# Patient Record
Sex: MALE | Race: WHITE | NOT HISPANIC OR LATINO | Employment: PART TIME | URBAN - METROPOLITAN AREA
[De-identification: names, ages, dates, MRNs, and addresses within clinical notes are randomized per-mention and may not be internally consistent; named-entity substitution may affect disease eponyms.]

---

## 2017-06-05 ENCOUNTER — HOSPITAL ENCOUNTER (EMERGENCY)
Facility: CLINIC | Age: 57
Discharge: HOME | End: 2017-06-06
Attending: PHYSICIAN ASSISTANT | Admitting: PHYSICIAN ASSISTANT
Payer: COMMERCIAL

## 2017-06-05 ENCOUNTER — APPOINTMENT (OUTPATIENT)
Dept: GENERAL RADIOLOGY | Facility: CLINIC | Age: 57
End: 2017-06-05
Attending: PHYSICIAN ASSISTANT
Payer: COMMERCIAL

## 2017-06-05 DIAGNOSIS — J44.1 COPD EXACERBATION (H): ICD-10-CM

## 2017-06-05 DIAGNOSIS — F17.200 TOBACCO DEPENDENCE: ICD-10-CM

## 2017-06-05 DIAGNOSIS — J18.9 PNEUMONIA OF LEFT LOWER LOBE DUE TO INFECTIOUS ORGANISM: ICD-10-CM

## 2017-06-05 LAB
ANION GAP SERPL CALCULATED.3IONS-SCNC: 8 MMOL/L (ref 3–14)
BASE EXCESS BLDV CALC-SCNC: 1.5 MMOL/L
BASOPHILS # BLD AUTO: 0 10E9/L (ref 0–0.2)
BASOPHILS NFR BLD AUTO: 0.3 %
BUN SERPL-MCNC: 11 MG/DL (ref 7–30)
CALCIUM SERPL-MCNC: 8.8 MG/DL (ref 8.5–10.1)
CHLORIDE SERPL-SCNC: 108 MMOL/L (ref 94–109)
CO2 SERPL-SCNC: 26 MMOL/L (ref 20–32)
CREAT SERPL-MCNC: 1.07 MG/DL (ref 0.66–1.25)
CRP SERPL-MCNC: 3.9 MG/L (ref 0–8)
D DIMER PPP FEU-MCNC: 0.5 UG/ML FEU (ref 0–0.5)
DIFFERENTIAL METHOD BLD: ABNORMAL
EOSINOPHIL # BLD AUTO: 0.5 10E9/L (ref 0–0.7)
EOSINOPHIL NFR BLD AUTO: 3.5 %
ERYTHROCYTE [DISTWIDTH] IN BLOOD BY AUTOMATED COUNT: 13.7 % (ref 10–15)
GFR SERPL CREATININE-BSD FRML MDRD: 72 ML/MIN/1.7M2
GLUCOSE SERPL-MCNC: 106 MG/DL (ref 70–99)
HCO3 BLDV-SCNC: 26 MMOL/L (ref 21–28)
HCT VFR BLD AUTO: 43.3 % (ref 40–53)
HGB BLD-MCNC: 14.4 G/DL (ref 13.3–17.7)
IMM GRANULOCYTES # BLD: 0 10E9/L (ref 0–0.4)
IMM GRANULOCYTES NFR BLD: 0.2 %
LACTATE BLD-SCNC: 1.4 MMOL/L (ref 0.7–2.1)
LYMPHOCYTES # BLD AUTO: 2.5 10E9/L (ref 0.8–5.3)
LYMPHOCYTES NFR BLD AUTO: 19.1 %
MCH RBC QN AUTO: 31.5 PG (ref 26.5–33)
MCHC RBC AUTO-ENTMCNC: 33.3 G/DL (ref 31.5–36.5)
MCV RBC AUTO: 95 FL (ref 78–100)
MONOCYTES # BLD AUTO: 1.2 10E9/L (ref 0–1.3)
MONOCYTES NFR BLD AUTO: 9 %
NEUTROPHILS # BLD AUTO: 8.9 10E9/L (ref 1.6–8.3)
NEUTROPHILS NFR BLD AUTO: 67.9 %
NT-PROBNP SERPL-MCNC: 196 PG/ML (ref 0–900)
PCO2 BLDV: 37 MM HG (ref 40–50)
PH BLDV: 7.45 PH (ref 7.32–7.43)
PLATELET # BLD AUTO: 380 10E9/L (ref 150–450)
PO2 BLDV: 59 MM HG (ref 25–47)
POTASSIUM SERPL-SCNC: 3.5 MMOL/L (ref 3.4–5.3)
RBC # BLD AUTO: 4.57 10E12/L (ref 4.4–5.9)
SODIUM SERPL-SCNC: 142 MMOL/L (ref 133–144)
WBC # BLD AUTO: 13.1 10E9/L (ref 4–11)

## 2017-06-05 PROCEDURE — 94640 AIRWAY INHALATION TREATMENT: CPT | Performed by: PHYSICIAN ASSISTANT

## 2017-06-05 PROCEDURE — 85025 COMPLETE CBC W/AUTO DIFF WBC: CPT | Performed by: PHYSICIAN ASSISTANT

## 2017-06-05 PROCEDURE — 96361 HYDRATE IV INFUSION ADD-ON: CPT | Performed by: PHYSICIAN ASSISTANT

## 2017-06-05 PROCEDURE — 82803 BLOOD GASES ANY COMBINATION: CPT | Performed by: PHYSICIAN ASSISTANT

## 2017-06-05 PROCEDURE — 25000125 ZZHC RX 250: Performed by: EMERGENCY MEDICINE

## 2017-06-05 PROCEDURE — 71020 XR CHEST 2 VW: CPT | Mod: TC

## 2017-06-05 PROCEDURE — 25000128 H RX IP 250 OP 636: Performed by: PHYSICIAN ASSISTANT

## 2017-06-05 PROCEDURE — 80048 BASIC METABOLIC PNL TOTAL CA: CPT | Performed by: PHYSICIAN ASSISTANT

## 2017-06-05 PROCEDURE — 83880 ASSAY OF NATRIURETIC PEPTIDE: CPT | Performed by: PHYSICIAN ASSISTANT

## 2017-06-05 PROCEDURE — 85379 FIBRIN DEGRADATION QUANT: CPT | Performed by: PHYSICIAN ASSISTANT

## 2017-06-05 PROCEDURE — 83605 ASSAY OF LACTIC ACID: CPT | Performed by: PHYSICIAN ASSISTANT

## 2017-06-05 PROCEDURE — 96374 THER/PROPH/DIAG INJ IV PUSH: CPT | Performed by: PHYSICIAN ASSISTANT

## 2017-06-05 PROCEDURE — 99285 EMERGENCY DEPT VISIT HI MDM: CPT | Mod: Z6 | Performed by: PHYSICIAN ASSISTANT

## 2017-06-05 PROCEDURE — 25000125 ZZHC RX 250: Performed by: PHYSICIAN ASSISTANT

## 2017-06-05 PROCEDURE — 86140 C-REACTIVE PROTEIN: CPT | Performed by: PHYSICIAN ASSISTANT

## 2017-06-05 PROCEDURE — 99284 EMERGENCY DEPT VISIT MOD MDM: CPT | Mod: 25 | Performed by: PHYSICIAN ASSISTANT

## 2017-06-05 RX ORDER — METHYLPREDNISOLONE SODIUM SUCCINATE 125 MG/2ML
125 INJECTION, POWDER, LYOPHILIZED, FOR SOLUTION INTRAMUSCULAR; INTRAVENOUS ONCE
Status: COMPLETED | OUTPATIENT
Start: 2017-06-05 | End: 2017-06-05

## 2017-06-05 RX ORDER — IPRATROPIUM BROMIDE AND ALBUTEROL SULFATE 2.5; .5 MG/3ML; MG/3ML
3 SOLUTION RESPIRATORY (INHALATION) ONCE
Status: COMPLETED | OUTPATIENT
Start: 2017-06-05 | End: 2017-06-05

## 2017-06-05 RX ORDER — LIDOCAINE 40 MG/G
CREAM TOPICAL
Status: DISCONTINUED | OUTPATIENT
Start: 2017-06-05 | End: 2017-06-06 | Stop reason: HOSPADM

## 2017-06-05 RX ORDER — LIDOCAINE 40 MG/G
CREAM TOPICAL
Status: DISCONTINUED | OUTPATIENT
Start: 2017-06-05 | End: 2017-06-05

## 2017-06-05 RX ADMIN — IPRATROPIUM BROMIDE AND ALBUTEROL SULFATE 3 ML: .5; 3 SOLUTION RESPIRATORY (INHALATION) at 23:58

## 2017-06-05 RX ADMIN — METHYLPREDNISOLONE SODIUM SUCCINATE 125 MG: 125 INJECTION, POWDER, FOR SOLUTION INTRAMUSCULAR; INTRAVENOUS at 23:39

## 2017-06-05 RX ADMIN — IPRATROPIUM BROMIDE AND ALBUTEROL SULFATE 3 ML: .5; 3 SOLUTION RESPIRATORY (INHALATION) at 22:54

## 2017-06-05 RX ADMIN — SODIUM CHLORIDE 1000 ML: 9 INJECTION, SOLUTION INTRAVENOUS at 23:36

## 2017-06-05 NOTE — ED AVS SNAPSHOT
[facility] Emergency Department    [address] [place] [address]    Phone:  [phone]    Fax:  [phone]                                       Eliseo Davis   MRN: [id number]    Department:  [facility] Emergency Department   Date of Visit:  6/5/2017           Patient Information     Date Of Birth          1960        Your diagnoses for this visit were:     Pneumonia of left lower lobe due to infectious organism     COPD exacerbation (H)        You were seen by Constantin Gonzales PA-C.      Follow-up Information     Follow up with [facility] Emergency Department.    Specialty:  EMERGENCY MEDICINE    Why:  As needed, If symptoms worsen    Contact information:    [address]   [place] [id number] [phone]    Additional information:    From [address]: Exit at [address] on [address] side of [place]. Turn right on [address]. Turn left at stoplight on [address]. [facility] will be in view two blocks ahead        Discharge Instructions       1.  [facility]  You will also need a repeat chest xray in 4-6 weeks to make sure the spot in your left lower lung completely clears.   2.  Please start the antibiotic, zithromax.   3.  Please use the nebulizer every 4 hours for the first 2 days, and then as needed after that.  4.  Please start your prednisone ( inflammation treatment for your lungs) tomorrow.  You received a dose of this already in your IV tonight.    5.  Please return to the ED if your symptoms are worsening.         24 Hour Appointment Hotline       To make an appointment at any [facility] clinic, call [phone] ([phone]). If you don't have a family doctor or clinic, we will help you find one. [facility] clinics are conveniently located to serve the needs of you and your family.          ED Discharge Orders     Mini-Elite Nebulizer                    Review of your  medicines      START taking        Dose / Directions Last dose taken    azithromycin 250 MG tablet   Commonly known as:  ZITHROMAX Z-FARIHA   Quantity:  6 tablet        Two tablets on the first day, then one tablet daily for the next 4 days   Refills:  0        ipratropium - albuterol 0.5 mg/2.5 mg/3 mL 0.5-2.5 (3) MG/3ML neb solution   Commonly known as:  DUONEB   Dose:  3 mL   Quantity:  75 mL        Take 1 vial (3 mLs) by nebulization every 4 hours as needed for shortness of breath / dyspnea or wheezing   Refills:  0        predniSONE 20 MG tablet   Commonly known as:  DELTASONE   Quantity:  10 tablet        Take two tablets (= 40mg) each day for 5 (five) days   Refills:  0          Our records show that you are taking the medicines listed below. If these are incorrect, please call your family doctor or clinic.        Dose / Directions Last dose taken    aspirin 325 MG EC tablet   Dose:  325 mg   Quantity:  60 tablet        Take 1 tablet (325 mg) by mouth daily   Refills:  0        cyclobenzaprine 10 MG tablet   Commonly known as:  FLEXERIL   Dose:  10 mg   Quantity:  60 tablet        Take 1 tablet (10 mg) by mouth 3 times daily as needed for muscle spasms   Refills:  1        ibuprofen 800 MG tablet   Commonly known as:  ADVIL/MOTRIN   Dose:  800 mg   Quantity:  90 tablet        Take 1 tablet (800 mg) by mouth every 8 hours as needed for moderate pain   Refills:  1                Prescriptions were sent or printed at these locations (3 Prescriptions)                   [facility]   [address] [address]    Telephone:  [phone]   Fax:  [phone]   Hours:                  These medications are not ready yet because we are checking if your insurance will help you pay for them. Call your pharmacy to confirm that your medication is ready for pickup. It may take up to 24 hours for them to receive the prescription. If the prescription is not ready within 3 business  "days, please contact your clinic or your provider (3 of 3)         azithromycin (ZITHROMAX Z-FARIHA) 250 MG tablet               ipratropium - albuterol 0.5 mg/2.5 mg/3 mL (DUONEB) 0.5-2.5 (3) MG/3ML neb solution               predniSONE (DELTASONE) 20 MG tablet                Procedures and tests performed during your visit     Basic metabolic panel    Blood gas venous    CBC with platelets differential    CRP inflammation    Cardiac Continuous Monitoring    D dimer quantitative    Lactic acid whole blood    Nt probnp inpatient (BNP)    Peripheral IV catheter    Pulse oximetry nursing    XR Chest 2 Views      Orders Needing Specimen Collection     None      Pending Results     Date and Time Order Name Status Description    6/5/2017 2305 XR Chest 2 Views Preliminary             Pending Culture Results     No orders found for last 3 day(s).            Pending Results Instructions     If you had any lab results that were not finalized at the time of your Discharge, you can call the ED Lab Result RN at [phone]. You will be contacted by this team for any positive Lab results or changes in treatment. The nurses are available 7 days a week from 10A to 6:30P.  You can leave a message 24 hours per day and they will return your call.        Thank you for choosing [facility]       Thank you for choosing [facility] for your care. Our goal is always to provide you with excellent care. Hearing back from our patients is one way we can continue to improve our services. Please take a few minutes to complete the written survey that you may receive in the mail after you visit with us. Thank you!        [employer] Information     [employer] lets you send messages to your doctor, view your test results, renew your prescriptions, schedule appointments and more. To sign up, go to [email or web address] . Click on \"Log in\" on the left side of the screen, which will take you to the Welcome page. Then click on \"Sign up Now\" on the right side of " the page.     You will be asked to enter the access code listed below, as well as some personal information. Please follow the directions to create your username and password.     Your access code is: [email or web address]  Expires: 2017  1:16 AM     Your access code will  in 90 days. If you need help or a new code, please call your [facility] clinic or [phone].        Care EveryWhere ID     This is your Care EveryWhere ID. This could be used by other organizations to access your [facility] medical records  [id number]        After Visit Summary       This is your record. Keep this with you and show to your community pharmacist(s) and doctor(s) at your next visit.

## 2017-06-05 NOTE — ED AVS SNAPSHOT
[facility] Emergency Department    [address] [place] [address]    Phone:  [phone]    Fax:  [phone]                                       Eliseo Davis   MRN: [id number]    Department:  [facility] Emergency Department   Date of Visit:  6/5/2017           After Visit Summary Signature Page     I have received my discharge instructions, and my questions have been answered. I have discussed any challenges I see with this plan with the nurse or doctor.    ..........................................................................................................................................  Patient/Patient Representative Signature      ..........................................................................................................................................  Patient Representative Print Name and Relationship to Patient    ..................................................               ................................................  Date                                            Time    ..........................................................................................................................................  Reviewed by Signature/Title    ...................................................              ..............................................  Date                                                            Time

## 2017-06-06 VITALS
TEMPERATURE: 98.6 F | DIASTOLIC BLOOD PRESSURE: 76 MMHG | BODY MASS INDEX: 35.44 KG/M2 | HEART RATE: 97 BPM | WEIGHT: 240 LBS | SYSTOLIC BLOOD PRESSURE: 138 MMHG | RESPIRATION RATE: 20 BRPM | OXYGEN SATURATION: 92 %

## 2017-06-06 RX ORDER — IPRATROPIUM BROMIDE AND ALBUTEROL SULFATE 2.5; .5 MG/3ML; MG/3ML
3 SOLUTION RESPIRATORY (INHALATION) EVERY 4 HOURS PRN
Qty: 75 ML | Refills: 0 | Status: ON HOLD | OUTPATIENT
Start: 2017-06-06 | End: 2017-06-23

## 2017-06-06 RX ORDER — PREDNISONE 20 MG/1
TABLET ORAL
Qty: 10 TABLET | Refills: 0 | Status: ON HOLD | OUTPATIENT
Start: 2017-06-06 | End: 2017-06-13

## 2017-06-06 RX ORDER — AZITHROMYCIN 250 MG/1
TABLET, FILM COATED ORAL
Qty: 6 TABLET | Refills: 0 | Status: ON HOLD | OUTPATIENT
Start: 2017-06-06 | End: 2017-06-13

## 2017-06-06 NOTE — DISCHARGE INSTRUCTIONS
1.  [facility].  You will also need a repeat chest xray in 4-6 weeks to make sure the spot in your left lower lung completely clears.   2.  Please start the antibiotic, zithromax.   3.  Please use the nebulizer every 4 hours for the first 2 days, and then as needed after that.  4.  Please start your prednisone ( inflammation treatment for your lungs) tomorrow.  You received a dose of this already in your IV tonight.    5.  Please return to the ED if your symptoms are worsening.

## 2017-06-06 NOTE — ED PROVIDER NOTES
History     Chief Complaint   Patient presents with     Shortness of Breath     HPI  Eliseo Davis is a 56 year old male who developed respiratory symptoms starting about noon today. Noted dyspnea and wheezing. He has an old albuterol inhaler at home. He uses this about 10 different times today with minimal improvement. Last dose of this was at 2230. He denies any URI symptoms of rhinorrhea, congestion, sinus pain, otalgia, sore throat.  [facility].  He denies any significant chest pain. Denies any lower extremity edema. No past history of venous thromboembolism. He states that he was told that he has asthma in the past, but has never been diagnosed with COPD. He denies having pneumonia before. No recent travel history. No ill contacts.    I have reviewed the Medications, Allergies, Past Medical and Surgical History, and Social History in the [employer] system.    Past Medical History:   Diagnosis Date     CARDIOVASCULAR SCREENING; LDL GOAL LESS THAN 130 9/25/2013     Uncomplicated asthma         Patient Active Problem List   Diagnosis     CARDIOVASCULAR SCREENING; LDL GOAL LESS THAN 130     Thoracic back pain     Back muscle spasm        History reviewed. No pertinent surgical history.     Social History     Social History     Marital status: Single     Spouse name: N/A     Number of children: N/A     Years of education: N/A     Occupational History     Not on file.     Social History Main Topics     Smoking status: Former Smoker     Packs/day: 1.00     Years: 35.00     Types: Cigarettes     Smokeless tobacco: Former User     Quit date: 1/5/2017     Alcohol use Yes      Comment: 2 drinks yesterday     Drug use: Yes     Special: Marijuana      Comment: 2-3 tiomes a year     Sexual activity: Not Currently     Other Topics Concern     Not on file     Social History Narrative        Current Facility-Administered Medications   Medication     lidocaine 1 % 1 mL     lidocaine (LMX4) kit     sodium chloride (PF)  0.9% PF flush 3 mL     sodium chloride (PF) 0.9% PF flush 3 mL     Current Outpatient Prescriptions   Medication     azithromycin (ZITHROMAX Z-FARIHA) 250 MG tablet     ipratropium - albuterol 0.5 mg/2.5 mg/3 mL (DUONEB) 0.5-2.5 (3) MG/3ML neb solution     predniSONE (DELTASONE) 20 MG tablet     aspirin 325 MG EC tablet     cyclobenzaprine (FLEXERIL) 10 MG tablet     ibuprofen (ADVIL,MOTRIN) 800 MG tablet           Allergies   Allergen Reactions     Dust Mites      Penicillins      Pollen Extract           Review of Systems   All other systems reviewed and are negative.      Physical Exam   BP: 151/85  Pulse: 97  Temp: 98.6  F (37  C)  Resp: (!) 50  Weight: 108.9 kg (240 lb)  SpO2: 96 %  Physical Exam  Patient is in respiratory distress upon his presentation. He has tachypnea and grossly audible wheezing. Nebulization treatments started right away.  Skin:  No rashes or lesions are noted on inspection of the torso, face, and upper extremities.   Head: Normocephalic, atraumatic, nontender to palpation  Eyes: PERRLA, conjunctiva and sclera clear  Ears: Bilateral TM's and canals are clear.  TM's translucent without erythema or effusion.  Nose: Nares normal and patent bilaterally.  Mucous membranes are non-erythematous and non-edematous.  No sinus tenderness.  Throat: Mucous membranes are clear.  No tonsilar hypertrophy, exudate, or erythema.  Neck: Supple.  FROM without pain.  No adenopathy.  No thyromegaly.   Heart:  RRR with normal S1 and S2.  No S3 or S4.  No murmur, rub, gallop, or click.  PMI is nondisplaced.   LUNGS: positive findings: barrel chested, prolonged expiration, wheezing bilateral and throughout   Abdomen: Positive bowel sounds in all four quadrants.  No tenderness to palpation throughout.  No rebound and no guarding.  No hepatosplenomegaly.  No masses.   Extremities: extremities, peripheral pulses and reflexes normal, no edema, redness or tenderness in the calves or thighs, Paloma's sign is negative, no  sign of DVT.     ED Course     ED Course     Patient appears to be in respiratory distress at this time secondary to a COPD exacerbation versus infectious etiology. DuoNeb treatment will be given immediately. IV will be established and he will be given IV Solu-Medrol. Chest x-ray pending. Labs pending. We will follow his status for closely.     12:41 AM - I reevaluated the patient and his wheezing is much improved. I would say that his breath sounds are 90% improved from his presentation to the ED. He is feeling much better as well. Tachypnea significantly improved. She did have a dysmenorrhea and possible anaphylactic reaction to penicillin the past, therefore I will not give him ceftriaxone. Given his symptoms occurring only for one day, he likely will do well on bone Zithromax therapy.       Procedures             Critical Care time:  none             Results for orders placed or performed during the hospital encounter of 06/05/17   XR Chest 2 Views    Narrative    CHEST 2 VIEWS  6/5/2017 11:15 PM     HISTORY: Coughing. Wheezing. Dyspnea.    COMPARISON: 2/10/2014.    FINDINGS: A subtle ill-defined 4 cm hazy opacity in the lateral aspect  of the inferior left lung, only visualized on the posteroanterior  image. The lungs are otherwise clear. Normal-sized cardiac silhouette.  Atherosclerotic calcification in the thoracic aorta. A few calcified  right paratracheal and right hilar lymph nodes, likely related to  prior granulomatous infection.      Impression    IMPRESSION:  1. A subtle hazy opacity in the lateral aspect of the inferior left  lung, only visualized on the posteroanterior image. This is  nonspecific, with possibilities including a healed left eighth rib  fracture (an acute fracture was present in the left eighth rib at this  location on the comparison study dated 2/10/2014), scarring, and an  infectious and inflammatory process. A follow-up chest radiograph in a  few weeks with left rib views could be  considered for further  evaluation.  2. No other findings suspicious for active cardiopulmonary disease.   CBC with platelets differential   Result Value Ref Range    WBC 13.1 (H) 4.0 - 11.0 10e9/L    RBC Count 4.57 4.4 - 5.9 10e12/L    Hemoglobin 14.4 13.3 - 17.7 g/dL    Hematocrit 43.3 40.0 - 53.0 %    MCV 95 78 - 100 fl    MCH 31.5 26.5 - 33.0 pg    MCHC 33.3 31.5 - 36.5 g/dL    RDW 13.7 10.0 - 15.0 %    Platelet Count 380 150 - 450 10e9/L    Diff Method Automated Method     % Neutrophils 67.9 %    % Lymphocytes 19.1 %    % Monocytes 9.0 %    % Eosinophils 3.5 %    % Basophils 0.3 %    % Immature Granulocytes 0.2 %    Absolute Neutrophil 8.9 (H) 1.6 - 8.3 10e9/L    Absolute Lymphocytes 2.5 0.8 - 5.3 10e9/L    Absolute Monocytes 1.2 0.0 - 1.3 10e9/L    Absolute Eosinophils 0.5 0.0 - 0.7 10e9/L    Absolute Basophils 0.0 0.0 - 0.2 10e9/L    Abs Immature Granulocytes 0.0 0 - 0.4 10e9/L   D dimer quantitative   Result Value Ref Range    D Dimer 0.5 0.0 - 0.50 ug/ml FEU   Basic metabolic panel   Result Value Ref Range    Sodium 142 133 - 144 mmol/L    Potassium 3.5 3.4 - 5.3 mmol/L    Chloride 108 94 - 109 mmol/L    Carbon Dioxide 26 20 - 32 mmol/L    Anion Gap 8 3 - 14 mmol/L    Glucose 106 (H) 70 - 99 mg/dL    Urea Nitrogen 11 7 - 30 mg/dL    Creatinine 1.07 0.66 - 1.25 mg/dL    GFR Estimate 72 >60 mL/min/1.7m2    GFR Estimate If Black 87 >60 mL/min/1.7m2    Calcium 8.8 8.5 - 10.1 mg/dL   Blood gas venous   Result Value Ref Range    Ph Venous 7.45 (H) 7.32 - 7.43 pH    PCO2 Venous 37 (L) 40 - 50 mm Hg    PO2 Venous 59 (H) 25 - 47 mm Hg    Bicarbonate Venous 26 21 - 28 mmol/L    Base Excess Venous 1.5 mmol/L   Nt probnp inpatient (BNP)   Result Value Ref Range    N-Terminal Pro BNP Inpatient 196 0 - 900 pg/mL   CRP inflammation   Result Value Ref Range    CRP Inflammation 3.9 0.0 - 8.0 mg/L   Lactic acid whole blood   Result Value Ref Range    Lactic Acid 1.4 0.7 - 2.1 mmol/L        Labs Ordered and Resulted from Time  of ED Arrival Up to the Time of Departure from the ED   CBC WITH PLATELETS DIFFERENTIAL - Abnormal; Notable for the following:        Result Value    WBC 13.1 (*)     Absolute Neutrophil 8.9 (*)     All other components within normal limits   BASIC METABOLIC PANEL - Abnormal; Notable for the following:     Glucose 106 (*)     All other components within normal limits   BLOOD GAS VENOUS - Abnormal; Notable for the following:     Ph Venous 7.45 (*)     PCO2 Venous 37 (*)     PO2 Venous 59 (*)     All other components within normal limits   D DIMER QUANTITATIVE   NT PROBNP INPATIENT   CRP INFLAMMATION   LACTIC ACID WHOLE BLOOD   PERIPHERAL IV CATHETER   PULSE OXIMETRY NURSING   CARDIAC CONTINUOUS MONITORING       Assessments & Plan (with Medical Decision Making)     Pneumonia of left lower lobe due to infectious organism  COPD exacerbation (H)     56 year old male presents for evaluation of dyspnea, wheezing, and cough starting abruptly at 12:00 today. He attempted to treat his symptoms with albuterol MDI that he had left over, but this did not help the symptoms. He denies any previous signs of illness including fevers, chills, or other URI symptoms. On exam his vital signs show significant tachypnea but his oxygen saturations never went below 93%. Blood pressure normal. Given his tachypnea and significant wheezing upon presentation, he was given an immediate DuoNeb treatment. This needed to be repeated 2 different times, but at the end of his ED stay his breathing was significantly improved with almost complete resolution of his wheezing. Chest x-ray displayed a hazy faint density versus consolidated in the left lower lobe which could represent a pneumonia versus some other process. He has an elevated white blood cell count as well up to 13,100. D-dimer was negative. Basic metabolic panel showed a mild elevation in the glucose to 106. Venous blood gas with mild elevation in the pH to 7.45 PCO2 37. O2 59. Bicarbonate  26. BNP normal. CRP normal. Lactic acid level I.4.  Clinically, the patient appears to have a COPD exacerbation with possible early development of pneumonia. This may be an incidental finding as well, and he will need follow-up regarding this issue.  Since the max was prescribed for antibiotic therapy. We will also continue his prednisone burst with 40 mg of prednisone once daily for 5 days. She'll start that tomorrow. DuoNeb treatments every 4 hours for the next 2 days, and then p.r.n. afterwards. [facility] but they will evaluate his pulmonary status as well. I discussed an inpatient observation stay versus a trial of outpatient treatment. Patient feels much improved at this point. Using group decision making, we decided that he was comfortable going home given his significant improvement in symptoms. She agrees to return to the ED for repeat evaluation if symptoms worsening. Patient was in agreement with this plan and he was suitable for discharge.      I have reviewed the nursing notes.    I have reviewed the findings, diagnosis, plan and need for follow up with the patient.    New Prescriptions    AZITHROMYCIN (ZITHROMAX Z-FARIHA) 250 MG TABLET    Two tablets on the first day, then one tablet daily for the next 4 days    IPRATROPIUM - ALBUTEROL 0.5 MG/2.5 MG/3 ML (DUONEB) 0.5-2.5 (3) MG/3ML NEB SOLUTION    Take 1 vial (3 mLs) by nebulization every 4 hours as needed for shortness of breath / dyspnea or wheezing    PREDNISONE (DELTASONE) 20 MG TABLET    Take two tablets (= 40mg) each day for 5 (five) days       Final diagnoses:   Pneumonia of left lower lobe due to infectious organism   COPD exacerbation (H)     Disclaimer: This note consists of symbols derived from keyboarding, dictation and/or voice recognition software. As a result, there may be errors in the script that have gone undetected. Please consider this when interpreting information  found in this chart.      6/5/2017   Constantin Gonzales PA-C   [facility] EMERGENCY DEPARTMENT     Constantin Gonzales PA-C  06/06/17 0057

## 2017-06-06 NOTE — ED NOTES
Nebulizer machine reviewed and dispensed with patient. Take home medication of Duoneb, Zithromax, and Prednisone # as ordered sent with patient/family.  Medication information sheet sent with pt/family, they verbalize understanding use of medication.

## 2017-06-11 ENCOUNTER — APPOINTMENT (OUTPATIENT)
Dept: GENERAL RADIOLOGY | Facility: CLINIC | Age: 57
DRG: 190 | End: 2017-06-11
Attending: FAMILY MEDICINE
Payer: COMMERCIAL

## 2017-06-11 ENCOUNTER — HOSPITAL ENCOUNTER (INPATIENT)
Facility: CLINIC | Age: 57
LOS: 2 days | Discharge: HOME | DRG: 190 | End: 2017-06-13
Attending: FAMILY MEDICINE | Admitting: FAMILY MEDICINE
Payer: COMMERCIAL

## 2017-06-11 ENCOUNTER — APPOINTMENT (OUTPATIENT)
Dept: CT IMAGING | Facility: CLINIC | Age: 57
DRG: 190 | End: 2017-06-11
Attending: FAMILY MEDICINE
Payer: COMMERCIAL

## 2017-06-11 DIAGNOSIS — J96.90 RESPIRATORY FAILURE (H): ICD-10-CM

## 2017-06-11 DIAGNOSIS — R07.89 CHEST WALL PAIN: Primary | ICD-10-CM

## 2017-06-11 DIAGNOSIS — J45.901 REACTIVE AIRWAY DISEASE WITH WHEEZING WITH ACUTE EXACERBATION: ICD-10-CM

## 2017-06-11 DIAGNOSIS — L50.9 URTICARIA: ICD-10-CM

## 2017-06-11 PROBLEM — J96.01 ACUTE RESPIRATORY FAILURE WITH HYPOXIA (H): Status: ACTIVE | Noted: 2017-06-11

## 2017-06-11 PROBLEM — R06.03 RESPIRATORY DISTRESS: Status: ACTIVE | Noted: 2017-06-11

## 2017-06-11 LAB
ANION GAP SERPL CALCULATED.3IONS-SCNC: 9 MMOL/L (ref 3–14)
BASE DEFICIT BLDA-SCNC: 0.7 MMOL/L
BASE EXCESS BLDV CALC-SCNC: 1.5 MMOL/L
BASOPHILS # BLD AUTO: 0.1 10E9/L (ref 0–0.2)
BASOPHILS NFR BLD AUTO: 0.3 %
BUN SERPL-MCNC: 16 MG/DL (ref 7–30)
CALCIUM SERPL-MCNC: 8.7 MG/DL (ref 8.5–10.1)
CHLORIDE SERPL-SCNC: 103 MMOL/L (ref 94–109)
CO2 SERPL-SCNC: 27 MMOL/L (ref 20–32)
CREAT SERPL-MCNC: 1.19 MG/DL (ref 0.66–1.25)
D DIMER PPP FEU-MCNC: 0.6 UG/ML FEU (ref 0–0.5)
DIFFERENTIAL METHOD BLD: ABNORMAL
EOSINOPHIL # BLD AUTO: 0.5 10E9/L (ref 0–0.7)
EOSINOPHIL NFR BLD AUTO: 3.4 %
ERYTHROCYTE [DISTWIDTH] IN BLOOD BY AUTOMATED COUNT: 14.2 % (ref 10–15)
GFR SERPL CREATININE-BSD FRML MDRD: 63 ML/MIN/1.7M2
GLUCOSE SERPL-MCNC: 96 MG/DL (ref 70–99)
HCO3 BLD-SCNC: 26 MMOL/L (ref 21–28)
HCO3 BLDV-SCNC: 26 MMOL/L (ref 21–28)
HCT VFR BLD AUTO: 48 % (ref 40–53)
HGB BLD-MCNC: 15.8 G/DL (ref 13.3–17.7)
IMM GRANULOCYTES # BLD: 0.1 10E9/L (ref 0–0.4)
IMM GRANULOCYTES NFR BLD: 0.6 %
LACTATE BLD-SCNC: 1.5 MMOL/L (ref 0.7–2.1)
LYMPHOCYTES # BLD AUTO: 3 10E9/L (ref 0.8–5.3)
LYMPHOCYTES NFR BLD AUTO: 20.8 %
MCH RBC QN AUTO: 31.3 PG (ref 26.5–33)
MCHC RBC AUTO-ENTMCNC: 32.9 G/DL (ref 31.5–36.5)
MCV RBC AUTO: 95 FL (ref 78–100)
MONOCYTES # BLD AUTO: 1.1 10E9/L (ref 0–1.3)
MONOCYTES NFR BLD AUTO: 7.7 %
NEUTROPHILS # BLD AUTO: 9.7 10E9/L (ref 1.6–8.3)
NEUTROPHILS NFR BLD AUTO: 67.2 %
NT-PROBNP SERPL-MCNC: 47 PG/ML (ref 0–900)
O2/TOTAL GAS SETTING VFR VENT: 21 %
O2/TOTAL GAS SETTING VFR VENT: 40 %
OXYHGB MFR BLD: 96 % (ref 92–100)
PCO2 BLD: 48 MM HG (ref 35–45)
PCO2 BLDV: 39 MM HG (ref 40–50)
PH BLD: 7.33 PH (ref 7.35–7.45)
PH BLDV: 7.43 PH (ref 7.32–7.43)
PLATELET # BLD AUTO: 444 10E9/L (ref 150–450)
PO2 BLD: 96 MM HG (ref 80–105)
PO2 BLDV: 57 MM HG (ref 25–47)
POTASSIUM SERPL-SCNC: 4.2 MMOL/L (ref 3.4–5.3)
RBC # BLD AUTO: 5.05 10E12/L (ref 4.4–5.9)
SODIUM SERPL-SCNC: 139 MMOL/L (ref 133–144)
TROPONIN I SERPL-MCNC: NORMAL UG/L (ref 0–0.04)
WBC # BLD AUTO: 14.5 10E9/L (ref 4–11)

## 2017-06-11 PROCEDURE — 85379 FIBRIN DEGRADATION QUANT: CPT | Performed by: FAMILY MEDICINE

## 2017-06-11 PROCEDURE — 25000128 H RX IP 250 OP 636: Performed by: FAMILY MEDICINE

## 2017-06-11 PROCEDURE — 96361 HYDRATE IV INFUSION ADD-ON: CPT

## 2017-06-11 PROCEDURE — 82805 BLOOD GASES W/O2 SATURATION: CPT | Performed by: FAMILY MEDICINE

## 2017-06-11 PROCEDURE — 25000125 ZZHC RX 250: Performed by: FAMILY MEDICINE

## 2017-06-11 PROCEDURE — 93005 ELECTROCARDIOGRAM TRACING: CPT

## 2017-06-11 PROCEDURE — 71010 XR CHEST PORT 1 VW: CPT | Mod: TC

## 2017-06-11 PROCEDURE — 99221 1ST HOSP IP/OBS SF/LOW 40: CPT | Mod: AI | Performed by: FAMILY MEDICINE

## 2017-06-11 PROCEDURE — 99285 EMERGENCY DEPT VISIT HI MDM: CPT | Mod: 25

## 2017-06-11 PROCEDURE — 25000125 ZZHC RX 250

## 2017-06-11 PROCEDURE — 20000003 ZZH R&B ICU

## 2017-06-11 PROCEDURE — 36600 WITHDRAWAL OF ARTERIAL BLOOD: CPT

## 2017-06-11 PROCEDURE — 83605 ASSAY OF LACTIC ACID: CPT | Performed by: FAMILY MEDICINE

## 2017-06-11 PROCEDURE — 94640 AIRWAY INHALATION TREATMENT: CPT

## 2017-06-11 PROCEDURE — 80048 BASIC METABOLIC PNL TOTAL CA: CPT | Performed by: FAMILY MEDICINE

## 2017-06-11 PROCEDURE — 96376 TX/PRO/DX INJ SAME DRUG ADON: CPT

## 2017-06-11 PROCEDURE — 25000128 H RX IP 250 OP 636: Performed by: EMERGENCY MEDICINE

## 2017-06-11 PROCEDURE — 87081 CULTURE SCREEN ONLY: CPT | Performed by: FAMILY MEDICINE

## 2017-06-11 PROCEDURE — 96365 THER/PROPH/DIAG IV INF INIT: CPT

## 2017-06-11 PROCEDURE — 71260 CT THORAX DX C+: CPT

## 2017-06-11 PROCEDURE — 85025 COMPLETE CBC W/AUTO DIFF WBC: CPT | Performed by: FAMILY MEDICINE

## 2017-06-11 PROCEDURE — 84484 ASSAY OF TROPONIN QUANT: CPT | Performed by: FAMILY MEDICINE

## 2017-06-11 PROCEDURE — 94660 CPAP INITIATION&MGMT: CPT

## 2017-06-11 PROCEDURE — 96375 TX/PRO/DX INJ NEW DRUG ADDON: CPT

## 2017-06-11 PROCEDURE — 25000131 ZZH RX MED GY IP 250 OP 636 PS 637: Performed by: FAMILY MEDICINE

## 2017-06-11 PROCEDURE — 40000275 ZZH STATISTIC RCP TIME EA 10 MIN

## 2017-06-11 PROCEDURE — 82803 BLOOD GASES ANY COMBINATION: CPT | Performed by: FAMILY MEDICINE

## 2017-06-11 PROCEDURE — 83880 ASSAY OF NATRIURETIC PEPTIDE: CPT | Performed by: FAMILY MEDICINE

## 2017-06-11 PROCEDURE — 99207 ZZC CDG-HISTORY COMP: MEETS EXP. PROB FOCUSED- DOWN CODED LACK OF PFSH: CPT | Performed by: FAMILY MEDICINE

## 2017-06-11 RX ORDER — ONDANSETRON 2 MG/ML
4 INJECTION INTRAMUSCULAR; INTRAVENOUS EVERY 30 MIN PRN
Status: DISCONTINUED | OUTPATIENT
Start: 2017-06-11 | End: 2017-06-11

## 2017-06-11 RX ORDER — SODIUM CHLORIDE 9 MG/ML
INJECTION, SOLUTION INTRAVENOUS CONTINUOUS
Status: DISCONTINUED | OUTPATIENT
Start: 2017-06-11 | End: 2017-06-12

## 2017-06-11 RX ORDER — IPRATROPIUM BROMIDE AND ALBUTEROL SULFATE 2.5; .5 MG/3ML; MG/3ML
3 SOLUTION RESPIRATORY (INHALATION) EVERY 4 HOURS
Status: COMPLETED | OUTPATIENT
Start: 2017-06-11 | End: 2017-06-13

## 2017-06-11 RX ORDER — ALBUTEROL SULFATE 0.83 MG/ML
2.5 SOLUTION RESPIRATORY (INHALATION)
Status: DISCONTINUED | OUTPATIENT
Start: 2017-06-11 | End: 2017-06-11

## 2017-06-11 RX ORDER — PROCHLORPERAZINE 25 MG
25 SUPPOSITORY, RECTAL RECTAL EVERY 12 HOURS PRN
Status: DISCONTINUED | OUTPATIENT
Start: 2017-06-11 | End: 2017-06-13 | Stop reason: HOSPADM

## 2017-06-11 RX ORDER — ALBUTEROL SULFATE 0.83 MG/ML
2.5 SOLUTION RESPIRATORY (INHALATION) CONTINUOUS
Status: DISCONTINUED | OUTPATIENT
Start: 2017-06-11 | End: 2017-06-11

## 2017-06-11 RX ORDER — NALOXONE HYDROCHLORIDE 0.4 MG/ML
.1-.4 INJECTION, SOLUTION INTRAMUSCULAR; INTRAVENOUS; SUBCUTANEOUS
Status: DISCONTINUED | OUTPATIENT
Start: 2017-06-11 | End: 2017-06-13 | Stop reason: HOSPADM

## 2017-06-11 RX ORDER — LIDOCAINE 40 MG/G
CREAM TOPICAL
Status: CANCELLED | OUTPATIENT
Start: 2017-06-11

## 2017-06-11 RX ORDER — DEXAMETHASONE SODIUM PHOSPHATE 10 MG/ML
10 INJECTION, SOLUTION INTRAMUSCULAR; INTRAVENOUS ONCE
Status: COMPLETED | OUTPATIENT
Start: 2017-06-11 | End: 2017-06-11

## 2017-06-11 RX ORDER — IOPAMIDOL 755 MG/ML
100 INJECTION, SOLUTION INTRAVASCULAR ONCE
Status: COMPLETED | OUTPATIENT
Start: 2017-06-11 | End: 2017-06-11

## 2017-06-11 RX ORDER — SODIUM CHLORIDE 9 MG/ML
INJECTION, SOLUTION INTRAVENOUS CONTINUOUS
Status: DISCONTINUED | OUTPATIENT
Start: 2017-06-11 | End: 2017-06-11

## 2017-06-11 RX ORDER — ALBUTEROL SULFATE 0.83 MG/ML
2.5 SOLUTION RESPIRATORY (INHALATION) EVERY 4 HOURS
Status: DISCONTINUED | OUTPATIENT
Start: 2017-06-11 | End: 2017-06-12

## 2017-06-11 RX ORDER — IPRATROPIUM BROMIDE AND ALBUTEROL SULFATE 2.5; .5 MG/3ML; MG/3ML
3 SOLUTION RESPIRATORY (INHALATION) EVERY 4 HOURS
Status: DISCONTINUED | OUTPATIENT
Start: 2017-06-11 | End: 2017-06-11

## 2017-06-11 RX ORDER — IPRATROPIUM BROMIDE AND ALBUTEROL SULFATE 2.5; .5 MG/3ML; MG/3ML
3 SOLUTION RESPIRATORY (INHALATION) ONCE
Status: COMPLETED | OUTPATIENT
Start: 2017-06-11 | End: 2017-06-11

## 2017-06-11 RX ORDER — ONDANSETRON 2 MG/ML
4 INJECTION INTRAMUSCULAR; INTRAVENOUS EVERY 6 HOURS PRN
Status: DISCONTINUED | OUTPATIENT
Start: 2017-06-11 | End: 2017-06-13 | Stop reason: HOSPADM

## 2017-06-11 RX ORDER — IPRATROPIUM BROMIDE AND ALBUTEROL SULFATE 2.5; .5 MG/3ML; MG/3ML
SOLUTION RESPIRATORY (INHALATION)
Status: COMPLETED
Start: 2017-06-11 | End: 2017-06-11

## 2017-06-11 RX ORDER — MAGNESIUM SULFATE HEPTAHYDRATE 40 MG/ML
2 INJECTION, SOLUTION INTRAVENOUS ONCE
Status: COMPLETED | OUTPATIENT
Start: 2017-06-11 | End: 2017-06-11

## 2017-06-11 RX ORDER — ONDANSETRON 4 MG/1
4 TABLET, ORALLY DISINTEGRATING ORAL EVERY 6 HOURS PRN
Status: DISCONTINUED | OUTPATIENT
Start: 2017-06-11 | End: 2017-06-13 | Stop reason: HOSPADM

## 2017-06-11 RX ORDER — ALBUTEROL SULFATE 5 MG/ML
5 SOLUTION, NON-ORAL INHALATION CONTINUOUS
Status: DISCONTINUED | OUTPATIENT
Start: 2017-06-11 | End: 2017-06-11

## 2017-06-11 RX ORDER — IPRATROPIUM BROMIDE AND ALBUTEROL SULFATE 2.5; .5 MG/3ML; MG/3ML
3 SOLUTION RESPIRATORY (INHALATION) EVERY 30 MIN PRN
Status: DISCONTINUED | OUTPATIENT
Start: 2017-06-11 | End: 2017-06-11

## 2017-06-11 RX ORDER — PROCHLORPERAZINE MALEATE 5 MG
5-10 TABLET ORAL EVERY 6 HOURS PRN
Status: DISCONTINUED | OUTPATIENT
Start: 2017-06-11 | End: 2017-06-13 | Stop reason: HOSPADM

## 2017-06-11 RX ORDER — NALOXONE HYDROCHLORIDE 0.4 MG/ML
.1-.4 INJECTION, SOLUTION INTRAMUSCULAR; INTRAVENOUS; SUBCUTANEOUS
Status: CANCELLED | OUTPATIENT
Start: 2017-06-11

## 2017-06-11 RX ORDER — FENTANYL CITRATE 50 UG/ML
50-100 INJECTION, SOLUTION INTRAMUSCULAR; INTRAVENOUS
Status: DISCONTINUED | OUTPATIENT
Start: 2017-06-11 | End: 2017-06-12

## 2017-06-11 RX ORDER — SODIUM CHLORIDE 9 MG/ML
1000 INJECTION, SOLUTION INTRAVENOUS CONTINUOUS
Status: DISCONTINUED | OUTPATIENT
Start: 2017-06-11 | End: 2017-06-11

## 2017-06-11 RX ORDER — METHYLPREDNISOLONE SODIUM SUCCINATE 125 MG/2ML
125 INJECTION, POWDER, LYOPHILIZED, FOR SOLUTION INTRAMUSCULAR; INTRAVENOUS ONCE
Status: COMPLETED | OUTPATIENT
Start: 2017-06-11 | End: 2017-06-11

## 2017-06-11 RX ORDER — HYDROMORPHONE HYDROCHLORIDE 1 MG/ML
.5-1 INJECTION, SOLUTION INTRAMUSCULAR; INTRAVENOUS; SUBCUTANEOUS
Status: DISCONTINUED | OUTPATIENT
Start: 2017-06-11 | End: 2017-06-11

## 2017-06-11 RX ORDER — LIDOCAINE 40 MG/G
CREAM TOPICAL
Status: DISCONTINUED | OUTPATIENT
Start: 2017-06-11 | End: 2017-06-11

## 2017-06-11 RX ORDER — LORAZEPAM 2 MG/ML
0.5 INJECTION INTRAMUSCULAR EVERY 4 HOURS PRN
Status: COMPLETED | OUTPATIENT
Start: 2017-06-11 | End: 2017-06-11

## 2017-06-11 RX ORDER — LORAZEPAM 2 MG/ML
.5-1 INJECTION INTRAMUSCULAR ONCE
Status: COMPLETED | OUTPATIENT
Start: 2017-06-11 | End: 2017-06-11

## 2017-06-11 RX ORDER — METHYLPREDNISOLONE SODIUM SUCCINATE 125 MG/2ML
125 INJECTION, POWDER, LYOPHILIZED, FOR SOLUTION INTRAMUSCULAR; INTRAVENOUS EVERY 6 HOURS
Status: DISCONTINUED | OUTPATIENT
Start: 2017-06-12 | End: 2017-06-12

## 2017-06-11 RX ADMIN — LORAZEPAM 1 MG: 2 INJECTION INTRAMUSCULAR; INTRAVENOUS at 17:02

## 2017-06-11 RX ADMIN — IPRATROPIUM BROMIDE AND ALBUTEROL SULFATE 3 ML: .5; 3 SOLUTION RESPIRATORY (INHALATION) at 12:30

## 2017-06-11 RX ADMIN — ALBUTEROL SULFATE 5 MG/HR: 5 SOLUTION RESPIRATORY (INHALATION) at 17:59

## 2017-06-11 RX ADMIN — HYDROMORPHONE HYDROCHLORIDE 0.5 MG: 1 INJECTION, SOLUTION INTRAMUSCULAR; INTRAVENOUS; SUBCUTANEOUS at 14:10

## 2017-06-11 RX ADMIN — IPRATROPIUM BROMIDE AND ALBUTEROL SULFATE 3 ML: .5; 3 SOLUTION RESPIRATORY (INHALATION) at 12:25

## 2017-06-11 RX ADMIN — IOPAMIDOL 80 ML: 755 INJECTION, SOLUTION INTRAVENOUS at 14:20

## 2017-06-11 RX ADMIN — FENTANYL CITRATE 100 MCG: 50 INJECTION, SOLUTION INTRAMUSCULAR; INTRAVENOUS at 20:31

## 2017-06-11 RX ADMIN — SODIUM CHLORIDE 1000 ML: 9 INJECTION, SOLUTION INTRAVENOUS at 12:26

## 2017-06-11 RX ADMIN — LORAZEPAM 0.5 MG: 2 INJECTION INTRAMUSCULAR; INTRAVENOUS at 13:26

## 2017-06-11 RX ADMIN — FENTANYL CITRATE 100 MCG: 50 INJECTION, SOLUTION INTRAMUSCULAR; INTRAVENOUS at 18:42

## 2017-06-11 RX ADMIN — SODIUM CHLORIDE 1000 ML: 9 INJECTION, SOLUTION INTRAVENOUS at 13:26

## 2017-06-11 RX ADMIN — IPRATROPIUM BROMIDE AND ALBUTEROL SULFATE 3 ML: .5; 3 SOLUTION RESPIRATORY (INHALATION) at 14:58

## 2017-06-11 RX ADMIN — ONDANSETRON 4 MG: 2 INJECTION INTRAMUSCULAR; INTRAVENOUS at 22:55

## 2017-06-11 RX ADMIN — IPRATROPIUM BROMIDE AND ALBUTEROL SULFATE 3 ML: .5; 3 SOLUTION RESPIRATORY (INHALATION) at 20:26

## 2017-06-11 RX ADMIN — METHYLPREDNISOLONE SODIUM SUCCINATE 125 MG: 125 INJECTION, POWDER, FOR SOLUTION INTRAMUSCULAR; INTRAVENOUS at 17:32

## 2017-06-11 RX ADMIN — IPRATROPIUM BROMIDE AND ALBUTEROL SULFATE 3 ML: 2.5; .5 SOLUTION RESPIRATORY (INHALATION) at 12:25

## 2017-06-11 RX ADMIN — LORAZEPAM 0.5 MG: 2 INJECTION INTRAMUSCULAR; INTRAVENOUS at 18:09

## 2017-06-11 RX ADMIN — SODIUM CHLORIDE: 9 INJECTION, SOLUTION INTRAVENOUS at 23:07

## 2017-06-11 RX ADMIN — ALBUTEROL SULFATE 2.5 MG: 2.5 SOLUTION RESPIRATORY (INHALATION) at 13:17

## 2017-06-11 RX ADMIN — IPRATROPIUM BROMIDE AND ALBUTEROL SULFATE 3 ML: .5; 3 SOLUTION RESPIRATORY (INHALATION) at 16:45

## 2017-06-11 RX ADMIN — FENTANYL CITRATE 50 MCG: 50 INJECTION, SOLUTION INTRAMUSCULAR; INTRAVENOUS at 13:28

## 2017-06-11 RX ADMIN — DEXAMETHASONE SODIUM PHOSPHATE 10 MG: 10 INJECTION, SOLUTION INTRAMUSCULAR; INTRAVENOUS at 12:23

## 2017-06-11 RX ADMIN — DEXMEDETOMIDINE HYDROCHLORIDE 0.2 MCG/KG/HR: 100 INJECTION, SOLUTION INTRAVENOUS at 23:21

## 2017-06-11 RX ADMIN — SODIUM CHLORIDE 70 ML: 9 INJECTION, SOLUTION INTRAVENOUS at 14:20

## 2017-06-11 RX ADMIN — MAGNESIUM SULFATE IN WATER 2 G: 40 INJECTION, SOLUTION INTRAVENOUS at 17:32

## 2017-06-11 ASSESSMENT — ACTIVITIES OF DAILY LIVING (ADL)
TRANSFERRING: 0-->INDEPENDENT
RETIRED_EATING: 0-->INDEPENDENT
COGNITION: 0 - NO COGNITION ISSUES REPORTED
TOILETING: 0-->INDEPENDENT
NUMBER_OF_TIMES_PATIENT_HAS_FALLEN_WITHIN_LAST_SIX_MONTHS: 1
FALL_HISTORY_WITHIN_LAST_SIX_MONTHS: YES
DRESS: 0-->INDEPENDENT
SWALLOWING: 0-->SWALLOWS FOODS/LIQUIDS WITHOUT DIFFICULTY
BATHING: 0-->INDEPENDENT
RETIRED_COMMUNICATION: 0-->UNDERSTANDS/COMMUNICATES WITHOUT DIFFICULTY
AMBULATION: 0-->INDEPENDENT

## 2017-06-11 NOTE — ED NOTES
Pt returns from CT and states he feels a bit better after dilaudid and definitely breathing easier on oxymask.  Feels best sitting up and states he has not slept laying down in over a wk.      Pt cont to be tachypnic and denies any needs at this time.  Waiting for CT results.

## 2017-06-11 NOTE — ED NOTES
Patient c/o increased tightness in chest.  Sister at bedside.  Dr. Bansal notified.  Plan for reassessment.

## 2017-06-11 NOTE — ED NOTES
PT cont to be SOB after nebs.  Cont to speak in short/few word sentences.  D-dimer elevated.  CT scan ordered.

## 2017-06-11 NOTE — ED NOTES
Was diagnosed with pneumonia on Tuesday, sent home with antibiotic which he has finished. Comes in today with Severe Shortness of breath. Dr. Bansal followed patient into room.

## 2017-06-11 NOTE — PROGRESS NOTES
S  Respiratory Care B  SOA  A  Despite multiple nebs, patient is still very SOA & wheezy. Per Dr. Bansal, Bipap initiated @ 10/4, RR 12, Fi02 40%. R Patient resting comfortably and expressed desire to sleep. Awaiting transfer to ICU. Now Dr. Bansal is considering transfer to facility with Pulmonology.

## 2017-06-11 NOTE — IP AVS SNAPSHOT
[address] [facility]    [address] [place] [address]    Phone:  [phone]                                       After Visit Summary   6/11/2017    Eliseo Davis    MRN: [id number]           After Visit Summary Signature Page     I have received my discharge instructions, and my questions have been answered. I have discussed any challenges I see with this plan with the nurse or doctor.    ..........................................................................................................................................  Patient/Patient Representative Signature      ..........................................................................................................................................  Patient Representative Print Name and Relationship to Patient    ..................................................               ................................................  Date                                            Time    ..........................................................................................................................................  Reviewed by Signature/Title    ...................................................              ..............................................  Date                                                            Time

## 2017-06-11 NOTE — ED NOTES
"Patient pulled biPap mask off and became increasing sob, \"I'm feeling tight.\"  Provider and RT called to room.  "

## 2017-06-11 NOTE — ED PROVIDER NOTES
History     Chief Complaint   Patient presents with     Shortness of Breath     The history is provided by the patient and medical records.     Eliseo Davis is a 56 year old male who is here with shortness of breath.  He was seen in the ED June 5 by Constantin Gonzales- here is part of that note:  56 year old male presents for evaluation of dyspnea, wheezing, and cough starting abruptly at 12:00 today. He attempted to treat his symptoms with albuterol MDI that he had left over, but this did not help the symptoms. He denies any previous signs of illness including fevers, chills, or other URI symptoms. On exam his vital signs show significant tachypnea but his oxygen saturations never went below 93%. Blood pressure normal. Given his tachypnea and significant wheezing upon presentation, he was given an immediate DuoNeb treatment. This needed to be repeated 2 different times, but at the end of his ED stay his breathing was significantly improved with almost complete resolution of his wheezing. Chest x-ray displayed a hazy faint density versus consolidated in the left lower lobe which could represent a pneumonia versus some other process. He has an elevated white blood cell count as well up to 13,100. D-dimer was negative. Basic metabolic panel showed a mild elevation in the glucose to 106. Venous blood gas with mild elevation in the pH to 7.45 PCO2 37. O2 59. Bicarbonate 26. BNP normal. CRP normal. Lactic acid level I.4.  Clinically, the patient appears to have a COPD exacerbation with possible early development of pneumonia. This may be an incidental finding as well, and he will need follow-up regarding this issue.  Since the max was prescribed for antibiotic therapy. We will also continue his prednisone burst with 40 mg of prednisone once daily for 5 days. She'll start that tomorrow. DuoNeb treatments every 4 hours for the next 2 days, and then p.r.n. afterwards. [facility], but they will evaluate his pulmonary status as well. I discussed an inpatient observation stay versus a trial of outpatient treatment. Patient feels much improved at this point. Using group decision making, we decided that he was comfortable going home given his significant improvement in symptoms. She agrees to return to the ED for repeat evaluation if symptoms worsening. Patient was in agreement with this plan and he was suitable for discharge.   Today he has increasing shortness of breath. No chest pain. No abdominal pain or nausea.      I have reviewed the Medications, Allergies, Past Medical and Surgical History, and Social History in the Epic system.    Allergies:   Allergies   Allergen Reactions     Dust Mites      Penicillins      Pollen Extract          No current facility-administered medications on file prior to encounter.   Current Outpatient Prescriptions on File Prior to Encounter:  azithromycin (ZITHROMAX Z-FARIHA) 250 MG tablet Two tablets on the first day, then one tablet daily for the next 4 days   ipratropium - albuterol 0.5 mg/2.5 mg/3 mL (DUONEB) 0.5-2.5 (3) MG/3ML neb solution Take 1 vial (3 mLs) by nebulization every 4 hours as needed for shortness of breath / dyspnea or wheezing   predniSONE (DELTASONE) 20 MG tablet Take two tablets (= 40mg) each day for 5 (five) days   ibuprofen (ADVIL,MOTRIN) 800 MG tablet Take 1 tablet (800 mg) by mouth every 8 hours as needed for moderate pain       Patient Active Problem List   Diagnosis     CARDIOVASCULAR SCREENING; LDL GOAL LESS THAN 130     Thoracic back pain     Back muscle spasm       History reviewed. No pertinent surgical history.    Social History   Substance Use Topics     Smoking status: Former Smoker     Packs/day: 1.00     Years: 35.00     Types: Cigarettes     Smokeless tobacco: Former User     Quit date: 1/5/2017     Alcohol use Yes      Comment: 2 drinks yesterday         There is no immunization history on file for  "this patient.    BMI: Estimated body mass index is 35.44 kg/(m^2) as calculated from the following:    Height as of 6/3/14: 1.753 m (5' 9\").    Weight as of this encounter: 108.9 kg (240 lb).      Review of Systems    Physical Exam   BP: (!) 171/94  Heart Rate: 109  Temp: 96.9  F (36.1  C)  Resp: (!) 36  Weight: 108.9 kg (240 lb)  SpO2: 92 %    Physical Exam    ED Course  2:54 PM  At this point he is still having significant shortness of breath and increased work of breathing. He has tight breath sounds in all lung fields.  We have looked at respiratory causes for this: VBG which showed normal pH, PCO2 of 39 and a normal bicarb,  CBC shows a white count of 14,500, normal chest x-ray and normal CT scan of the chest.  We have tried both DuoNeb and albuterol with virtually no improvement.  He is on oxygen at this time with Oxy Mask at 2 L.  We have looked at cardiac causes for this: EKG was normal, troponin was normal, hemoglobin is normal, BNP is normal, and chest x-ray shows no evidence of cardiomegaly.  We have looked for infectious causes of this: Lactic acid is normal, CBC is mildly elevated at 14,500.  At this time the patient continues to have significant shortness of breath, is gray and diaphoretic.  We're repeating an EKG and will try a another DuoNeb treatment.  I did review Constantin's note from the previous visit and the patient got 3 DuoNeb treatments before improvement of his respiratory status.  If the DuoNeb is not helping we will try BiPAP.    4:09 PM  He is improved after being started on BiPap.  We will bring him into the hospital for ongoing BiPap and evaluated him again tomorrow as a hospital patient.     5:00 PM  The plan has changed. He took his mask off for just a minute, thinking that he was going to walk to the restroom, and had almost immediate respiratory compromise. He was tachypneic with RR 60 and heart rate 140. I adjusted the Bipap to 16i/ 4e and oxygen 70% and he had improvement in about " five minutes.  We will not be able to keep this patient here and he is okay with transfer to [facility].    5:49 PM  At this point Dr Dunn was able to come to the ED and evaluate the patient. We have magnesium sulfate running, and could repeat that one time, and we have continuous albuterol nebs running.  Bipap is 16i/4e/40% right now. We will recheck his status in 1 hour and plan to keep or transfer the patient at that time.    8:01 PM  The patient is stable enough to stay here. Dr Barros has seen the patient and has accepted him.         ED Course     Procedures    EKG was reviewed by me.  Normal sinus rhythm.  Normal rate.  Normal axis.  No ST segment abnormalities.    Repeat EKG done at 3:11 PM shows sinus tachycardia with heart rate 121 bpm.  Normal axis.  No ST segment elevation.    Results for orders placed or performed during the hospital encounter of 06/11/17 (from the past 24 hour(s))   CBC with platelets differential   Result Value Ref Range    WBC 14.5 (H) 4.0 - 11.0 10e9/L    RBC Count 5.05 4.4 - 5.9 10e12/L    Hemoglobin 15.8 13.3 - 17.7 g/dL    Hematocrit 48.0 40.0 - 53.0 %    MCV 95 78 - 100 fl    MCH 31.3 26.5 - 33.0 pg    MCHC 32.9 31.5 - 36.5 g/dL    RDW 14.2 10.0 - 15.0 %    Platelet Count 444 150 - 450 10e9/L    Diff Method Automated Method     % Neutrophils 67.2 %    % Lymphocytes 20.8 %    % Monocytes 7.7 %    % Eosinophils 3.4 %    % Basophils 0.3 %    % Immature Granulocytes 0.6 %    Absolute Neutrophil 9.7 (H) 1.6 - 8.3 10e9/L    Absolute Lymphocytes 3.0 0.8 - 5.3 10e9/L    Absolute Monocytes 1.1 0.0 - 1.3 10e9/L    Absolute Eosinophils 0.5 0.0 - 0.7 10e9/L    Absolute Basophils 0.1 0.0 - 0.2 10e9/L    Abs Immature Granulocytes 0.1 0 - 0.4 10e9/L   D dimer quantitative   Result Value Ref Range    D Dimer 0.6 (H) 0.0 - 0.50 ug/ml FEU   Basic metabolic panel   Result Value Ref Range    Sodium 139 133 - 144 mmol/L    Potassium 4.2 3.4 - 5.3 mmol/L    Chloride 103 94 - 109 mmol/L    Carbon Dioxide  27 20 - 32 mmol/L    Anion Gap 9 3 - 14 mmol/L    Glucose 96 70 - 99 mg/dL    Urea Nitrogen 16 7 - 30 mg/dL    Creatinine 1.19 0.66 - 1.25 mg/dL    GFR Estimate 63 >60 mL/min/1.7m2    GFR Estimate If Black 77 >60 mL/min/1.7m2    Calcium 8.7 8.5 - 10.1 mg/dL   Lactic acid whole blood   Result Value Ref Range    Lactic Acid 1.5 0.7 - 2.1 mmol/L   Nt probnp inpatient (BNP)   Result Value Ref Range    N-Terminal Pro BNP Inpatient 47 0 - 900 pg/mL   Blood gas venous   Result Value Ref Range    Ph Venous 7.43 7.32 - 7.43 pH    PCO2 Venous 39 (L) 40 - 50 mm Hg    PO2 Venous 57 (H) 25 - 47 mm Hg    Bicarbonate Venous 26 21 - 28 mmol/L    Base Excess Venous 1.5 mmol/L    FIO2 21    Troponin I (now)   Result Value Ref Range    Troponin I ES  0.000 - 0.045 ug/L     <0.015  The 99th percentile for upper reference range is 0.045 ug/L.  Troponin values in   the range of 0.045 - 0.120 ug/L may be associated with risks of adverse   clinical events.     XR Chest Port 1 View    Narrative    XR CHEST PORT 1 VW   6/11/2017 1:01 PM     HISTORY: shortness of breath- recent pneumonia    COMPARISON: None.      Impression    IMPRESSION: No acute abnormality.     TOSHIA MORALES MD   Chest CT - IV contrast only - PE protocoll    Narrative    CT CHEST PULMONARY EMBOLISM WITH CONTRAST   6/11/2017 2:28 PM     HISTORY: Tachypnea, tachycardia, elevated D-dimer.    TECHNIQUE:  CT of the chest was performed following the administration  of 80mL, Isovue-370. Radiation dose for this scan was reduced using  automated exposure control, adjustment of the mA and/or kV according  to patient size, or iterative reconstruction technique.    COMPARISON: None.    FINDINGS:  There is no evidence for a pulmonary embolism. There is no  evidence for a thoracic aortic dissection. Mild soft plaque/wall  thickening at the origin of the left subclavian artery. This does not  contribute to a significant stenosis.    There are multiple normal-sized less than 1 cm  mediastinal lymph  nodes. There are calcified mediastinal and right hilar lymph nodes  consistent with old granulomatous disease.    There are calcified granulomas in the spleen.    There is a 5 mm nodule in the medial aspects of the right upper lobe  on image 43 series 6.      Impression    IMPRESSION:  1. No evidence for pulmonary embolism.  2. Changes of old granulomatous disease in the mediastinum, right  hilum and spleen.  3. 5 mm nodule in the right upper lobe.    Recommendations for one or multiple incidental lung nodules < 6mm :    Low risk patients: No routine follow-up.    High risk patients: Optional follow-up CT at 12 months; if  unchanged, no further follow-up.    *Low Risk: Minimal or absent history of smoking or other known risk  factors.  *Nonsolid (ground glass) or partly solid nodules may require longer  follow-up to exclude indolent adenocarcinoma.  *Recommendations based on Guidelines for the Management of Incidental  Pulmonary Nodules Detected at CT: From the Fleischner Society 2017,  Radiology 2017.      Blood gas arterial and oxyhgb   Result Value Ref Range    pH Arterial 7.33 (L) 7.35 - 7.45 pH    pCO2 Arterial 48 (H) 35 - 45 mm Hg    pO2 Arterial 96 80 - 105 mm Hg    Bicarbonate Arterial 26 21 - 28 mmol/L    FIO2 40     Oxyhemoglobin Arterial 96 92 - 100 %    Base Deficit Art 0.7 mmol/L       Medications   sodium chloride (PF) 0.9% PF flush 3 mL (3 mLs Intracatheter Given 6/11/17 1715)   sodium chloride (PF) 0.9% PF flush 3 mL (not administered)   ondansetron (ZOFRAN) injection 4 mg (not administered)   ipratropium - albuterol 0.5 mg/2.5 mg/3 mL (DUONEB) neb solution 3 mL (3 mLs Nebulization Given 6/11/17 1645)   albuterol neb solution 2.5 mg (2.5 mg Nebulization Not Given 6/11/17 1345)   fentaNYL Citrate (PF) (SUBLIMAZE) injection  mcg (100 mcg Intravenous Given 6/11/17 1842)   No lozenges or gum should be given while patient on BIPAP (not administered)   hypromellose-dextran  (ARTIFICAL TEARS) ophthalmic solution 1 drop (not administered)   Patient may continue current oral medications (not administered)   albuterol (PROVENTIL) continous nebulization (5 mg/hr Nebulization New Bag 6/11/17 1759)   0.9% sodium chloride infusion ( Intravenous Not Given 6/11/17 1904)   ipratropium - albuterol 0.5 mg/2.5 mg/3 mL (DUONEB) neb solution 3 mL (3 mLs Nebulization Given 6/11/17 1225)   0.9% sodium chloride BOLUS (0 mLs Intravenous Stopped 6/11/17 1324)   dexamethasone (DECADRON) oral solution (inj used orally) 10 mg (10 mg Oral Given 6/11/17 1223)   LORazepam (ATIVAN) injection 0.5 mg (0.5 mg Intravenous Given 6/11/17 1809)   sodium chloride (PF) 0.9% PF flush 3 mL (3 mLs Intravenous Given 6/11/17 1417)   sodium chloride 0.9 % for CT scan flush dose 500 mL (70 mLs As instructed Given 6/11/17 1420)   iopamidol (ISOVUE-370) solution 100 mL (80 mLs Intravenous Given 6/11/17 1420)   LORazepam (ATIVAN) injection 0.5-1 mg (1 mg Intravenous Given 6/11/17 1702)   magnesium sulfate 2 g in water intermittent infusion (2 g Intravenous New Bag 6/11/17 1732)   methylPREDNISolone sodium succinate (solu-MEDROL) injection 125 mg (125 mg Intravenous Given 6/11/17 1732)         Assessments & Plan (with Medical Decision Making)  Eliseo is a 57 yo male here with shortness of breath.  He was seen previously by Constantin Gonzales on June 5, 6 days ago.  He was started on azithromycin and prednisone.  Today he has more shortness of breath.  No chest pain, no abdominal pain or nausea.  On arrival he had tachypnea, increased work of breathing and tachycardia.  His vital signs were otherwise normal, with no fever.  Exam showed decreased air movement in all lung fields.  His labs showed an elevated d-dimer and a white count of 14,500, but the remaining labs are normal including the rest of the CBC, basic metabolic panel, BNP, lactic acid and troponin.  His initial EKG was normal and we had a 2nd EKG done at 3:10 PM and that was  normal as well.  We initially tried DuoNeb and albuterol, but eventually gave the patient BiPAP.  He showed significant improvement on BiPAP.  His chest x-ray was normal.  Because of his elevated d-dimer we did a CT scan with IV contrast to look for pulmonary embolus, and this was also normal.   He took his mask off for about one minute to walk to the restroom when he had an abrupt change in his status. He had extreme shortness of breath. We got his mask back in place and increased the BiPap settings to 16i/4e/70% and after ten minutes he is feeling much better. I think he will need pulmonology in house and recommend transfer to a larger facility. He would accept transfer to the [facility].  I spoke with Dr Minaya at the [facility] about this case.  The patient continued to be stable throughout the ED stay and was admitted to our facility.  Dr. Barros saw the patient.     I have reviewed the nursing notes.    I have reviewed the findings, diagnosis, plan and need for follow up with the patient.       New Prescriptions    No medications on file       Final diagnoses:   Respiratory failure (H)       6/11/2017   [facility] EMERGENCY DEPARTMENT     Keven Bansal MD  06/11/17 1614       Keven Bansal MD  06/11/17 2003

## 2017-06-11 NOTE — IP AVS SNAPSHOT
MRN:[id number]                      After Visit Summary   6/11/2017    Eliseo Davis    MRN: [id number]           Thank you!     Thank you for choosing [facility] for your care. Our goal is always to provide you with excellent care. Hearing back from our patients is one way we can continue to improve our services. Please take a few minutes to complete the written survey that you may receive in the mail after you visit with us. Thank you!        Patient Information     Date Of Birth          1960        Designated Caregiver       Most Recent Value    Caregiver    Will someone help with your care after discharge? no      About your hospital stay     You were admitted on:  June 11, 2017 You last received care in the:  [address] [facility]    You were discharged on:  June 13, 2017        Reason for your hospital stay       Hospitalized for breathing problems and improved                  Who to Call     For medical emergencies, please call 911.  For non-urgent questions about your medical care, please call your primary care provider or clinic, None          Attending Provider     Provider Specialty    Keven Bansal MD [facility]    Donell Barros MD [facility]    Nuno Pandey MD Internal Medicine       Primary Care Provider    None Doctor, MD      After Care Instructions     Activity       Your activity upon discharge: activity as tolerated            Diet       Follow this diet upon discharge: Orders Placed This Encounter      Advance Diet as Tolerated: Regular Diet Adult; Regular Diet Adult                  Follow-up Appointments     Follow-up and recommended labs and tests        Follow up with primary care provider within 2 weeks for hospital follow- up. The following labs/tests are recommended: PFTs and repeat chest CT to re-evaluate lung nodule in 12 months.                  Further instructions from your care team       [facility]  "with [facility]    Pending Results     No orders found from 2017 to 2017.            Statement of Approval     Ordered          17 1049  I have reviewed and agree with all the recommendations and orders detailed in this document.  EFFECTIVE NOW     Approved and electronically signed by:  Nuno Pandey MD             Admission Information     Date & Time Provider Department Dept. Phone    2017 Nuno Pandey MD [address] [phone]      Your Vitals Were     Blood Pressure Pulse Temperature Respirations Height Weight    104/59 102 97.9  F (36.6  C) (Oral) 18 1.753 m (5' 9\") 105 kg (231 lb 7.7 oz)    Pulse Oximetry BMI (Body Mass Index)                93% 34.18 kg/m2          MyChart Information     [employer] lets you send messages to your doctor, view your test results, renew your prescriptions, schedule appointments and more. To sign up, go to [email or web address] . Click on \"Log in\" on the left side of the screen, which will take you to the Welcome page. Then click on \"Sign up Now\" on the right side of the page.     You will be asked to enter the access code listed below, as well as some personal information. Please follow the directions to create your username and password.     Your access code is: [email or web address]  Expires: 2017  1:16 AM     Your access code will  in 90 days. If you need help or a new code, please call your [place] clinic or [phone].        Care EveryWhere ID     This is your Care EveryWhere ID. This could be used by other organizations to access your [facility] medical records  [id number]           Review of your medicines      START taking        Dose / Directions    acetaminophen 500 MG tablet   Commonly known as:  TYLENOL   Used for:  Chest wall pain        Dose:  1000 mg   Take 2 tablets (1,000 mg) by mouth every 6 hours as needed for mild pain   Refills:  0       albuterol 108 (90 BASE) MCG/ACT Inhaler   Commonly " known as:  PROAIR HFA/PROVENTIL HFA/VENTOLIN HFA        Dose:  2 puff   Inhale 2 puffs into the lungs 4 times daily For 5 days, then use 2 puffs every 4 hours as needed   Quantity:  1 Inhaler   Refills:  0       diphenhydrAMINE 50 MG capsule   Commonly known as:  BENADRYL        Dose:  50 mg   Take 1 capsule (50 mg) by mouth every 6 hours as needed for itching   Quantity:  56 capsule   Refills:  0         CONTINUE these medicines which may have CHANGED, or have new prescriptions. If we are uncertain of the size of tablets/capsules you have at home, strength may be listed as something that might have changed.        Dose / Directions    predniSONE 20 MG tablet   Commonly known as:  DELTASONE   This may have changed:    - how much to take  - how to take this  - when to take this  - additional instructions        Dose:  40 mg   Start taking on:  6/14/2017   Take 2 tablets (40 mg) by mouth daily for 5 days   Quantity:  10 tablet   Refills:  0         CONTINUE these medicines which have NOT CHANGED        Dose / Directions    fluticasone 27.5 MCG/SPRAY spray   Commonly known as:  VERAMYST        Dose:  2 spray   Spray 2 sprays into both nostrils daily   Refills:  0       ibuprofen 800 MG tablet   Commonly known as:  ADVIL/MOTRIN   Used for:  LBP (low back pain)        Dose:  800 mg   Take 1 tablet (800 mg) by mouth every 8 hours as needed for moderate pain   Quantity:  90 tablet   Refills:  1       ipratropium - albuterol 0.5 mg/2.5 mg/3 mL 0.5-2.5 (3) MG/3ML neb solution   Commonly known as:  DUONEB        Dose:  3 mL   Take 1 vial (3 mLs) by nebulization every 4 hours as needed for shortness of breath / dyspnea or wheezing   Quantity:  75 mL   Refills:  0         STOP taking     azithromycin 250 MG tablet   Commonly known as:  ZITHROMAX Z-FARIHA                Where to get your medicines      These medications were sent to [facility] [place], [place] - [address]   [address] [facility] [address]     Phone:   [phone]     albuterol 108 (90 BASE) MCG/ACT Inhaler    predniSONE 20 MG tablet         Some of these will need a paper prescription and others can be bought over the counter. Ask your nurse if you have questions.     You don't need a prescription for these medications     acetaminophen 500 MG tablet    diphenhydrAMINE 50 MG capsule                Protect others around you: Learn how to safely use, store and throw away your medicines at www.disposemymeds.org.             Medication List: This is a list of all your medications and when to take them. Check marks below indicate your daily home schedule. Keep this list as a reference.      Medications           Morning Afternoon Evening Bedtime As Needed    acetaminophen 500 MG tablet   Commonly known as:  TYLENOL   Take 2 tablets (1,000 mg) by mouth every 6 hours as needed for mild pain                                   albuterol 108 (90 BASE) MCG/ACT Inhaler   Commonly known as:  PROAIR HFA/PROVENTIL HFA/VENTOLIN HFA   Inhale 2 puffs into the lungs 4 times daily For 5 days, then use 2 puffs every 4 hours as needed                                   diphenhydrAMINE 50 MG capsule   Commonly known as:  BENADRYL   Take 1 capsule (50 mg) by mouth every 6 hours as needed for itching   Last time this was given:  50 mg on 6/13/2017 10:07 AM                                   fluticasone 27.5 MCG/SPRAY spray   Commonly known as:  VERAMYST   Spray 2 sprays into both nostrils daily   Next Dose Due:  tomorrow                                   ibuprofen 800 MG tablet   Commonly known as:  ADVIL/MOTRIN   Take 1 tablet (800 mg) by mouth every 8 hours as needed for moderate pain   Last time this was given:  800 mg on 6/12/2017 10:16 PM                                   ipratropium - albuterol 0.5 mg/2.5 mg/3 mL 0.5-2.5 (3) MG/3ML neb solution   Commonly known as:  DUONEB   Take 1 vial (3 mLs) by nebulization every 4 hours as needed for shortness of breath / dyspnea or wheezing    Last time this was given:  3 mLs on 6/13/2017  2:09 AM                                   predniSONE 20 MG tablet   Commonly known as:  DELTASONE   Take 2 tablets (40 mg) by mouth daily for 5 days   Start taking on:  6/14/2017   Last time this was given:  40 mg on 6/13/2017  9:16 AM   Next Dose Due:  tomorrow                                             More Information        Patient Education    Prednisone Gastro-resistant tablet    Prednisone Oral solution    Prednisone Oral tablet  Prednisone Oral tablet  What is this medicine?  PREDNISONE (PRED ni sone) is a corticosteroid. It is commonly used to treat inflammation of the skin, joints, lungs, and other organs. Common conditions treated include asthma, allergies, and arthritis. It is also used for other conditions, such as blood disorders and diseases of the adrenal glands.  This medicine may be used for other purposes; ask your health care provider or pharmacist if you have questions.  What should I tell my health care provider before I take this medicine?  They need to know if you have any of these conditions:    Cushing's syndrome    diabetes    glaucoma    heart disease    high blood pressure    infection (especially a virus infection such as chickenpox, cold sores, or herpes)    kidney disease    liver disease    mental illness    myasthenia gravis    osteoporosis    seizures    stomach or intestine problems    thyroid disease    an unusual or allergic reaction to lactose, prednisone, other medicines, foods, dyes, or preservatives    pregnant or trying to get pregnant    breast-feeding  How should I use this medicine?  Take this medicine by mouth with a glass of water. Follow the directions on the prescription label. Take this medicine with food. If you are taking this medicine once a day, take it in the morning. Do not take more medicine than you are told to take. Do not suddenly stop taking your medicine because you may develop a severe reaction. Your  doctor will tell you how much medicine to take. If your doctor wants you to stop the medicine, the dose may be slowly lowered over time to avoid any side effects.  Talk to your pediatrician regarding the use of this medicine in children. Special care may be needed.  Overdosage: If you think you have taken too much of this medicine contact a poison control center or emergency room at once.  NOTE: This medicine is only for you. Do not share this medicine with others.  What if I miss a dose?  If you miss a dose, take it as soon as you can. If it is almost time for your next dose, talk to your doctor or health care professional. You may need to miss a dose or take an extra dose. Do not take double or extra doses without advice.  What may interact with this medicine?  Do not take this medicine with any of the following medications:    metyrapone    mifepristone  This medicine may also interact with the following medications:    aminoglutethimide    amphotericin B    aspirin and aspirin-like medicines    barbiturates    certain medicines for diabetes, like glipizide or glyburide    cholestyramine    cholinesterase inhibitors    cyclosporine    digoxin    diuretics    ephedrine    female hormones, like estrogens and birth control pills    isoniazid    ketoconazole    NSAIDS, medicines for pain and inflammation, like ibuprofen or naproxen    phenytoin    rifampin    toxoids    vaccines    warfarin  This list may not describe all possible interactions. Give your health care provider a list of all the medicines, herbs, non-prescription drugs, or dietary supplements you use. Also tell them if you smoke, drink alcohol, or use illegal drugs. Some items may interact with your medicine.  What should I watch for while using this medicine?  Visit your doctor or health care professional for regular checks on your progress. If you are taking this medicine over a prolonged period, carry an identification card with your name and  address, the type and dose of your medicine, and your doctor's name and address.  This medicine may increase your risk of getting an infection. Tell your doctor or health care professional if you are around anyone with measles or chickenpox, or if you develop sores or blisters that do not heal properly.  If you are going to have surgery, tell your doctor or health care professional that you have taken this medicine within the last twelve months.  Ask your doctor or health care professional about your diet. You may need to lower the amount of salt you eat.  This medicine may affect blood sugar levels. If you have diabetes, check with your doctor or health care professional before you change your diet or the dose of your diabetic medicine.  What side effects may I notice from receiving this medicine?  Side effects that you should report to your doctor or health care professional as soon as possible:    allergic reactions like skin rash, itching or hives, swelling of the face, lips, or tongue    changes in emotions or moods    changes in vision    depressed mood    eye pain    fever or chills, cough, sore throat, pain or difficulty passing urine    increased thirst    swelling of ankles, feet  Side effects that usually do not require medical attention (report to your doctor or health care professional if they continue or are bothersome):    confusion, excitement, restlessness    headache    nausea, vomiting    skin problems, acne, thin and shiny skin    trouble sleeping    weight gain  This list may not describe all possible side effects. Call your doctor for medical advice about side effects. You may report side effects to FDA at 5-156-FDA-2408.  Where should I keep my medicine?  Keep out of the reach of children.  Store at room temperature between 15 and 30 degrees C (59 and 86 degrees F). Protect from light. Keep container tightly closed. Throw away any unused medicine after the expiration date.  NOTE:This sheet is  a summary. It may not cover all possible information. If you have questions about this medicine, talk to your doctor, pharmacist, or health care provider. Copyright  2016 [employer]                Patient Education    Albuterol Pressurized inhalation, suspension    Albuterol Sulfate Inhalation powder    Albuterol Sulfate Nebulizer solution    Albuterol Sulfate Oral syrup    Albuterol Sulfate Oral tablet    Albuterol Sulfate Oral tablet, extended-release  Albuterol Pressurized inhalation, suspension  What is this medicine?  ALBUTEROL (al BYOO ter ole) is a bronchodilator. It helps open up the airways in your lungs to make it easier to breathe. This medicine is used to treat and to prevent bronchospasm.  This medicine may be used for other purposes; ask your health care provider or pharmacist if you have questions.  What should I tell my health care provider before I take this medicine?  They need to know if you have any of the following conditions:    diabetes    heart disease or irregular heartbeat    high blood pressure    pheochromocytoma    seizures    thyroid disease    an unusual or allergic reaction to albuterol, levalbuterol, sulfites, other medicines, foods, dyes, or preservatives    pregnant or trying to get pregnant    breast-feeding  How should I use this medicine?  This medicine is for inhalation through the mouth. Follow the directions on your prescription label. Take your medicine at regular intervals. Do not use more often than directed. Make sure that you are using your inhaler correctly. Ask you doctor or health care provider if you have any questions.  Talk to your pediatrician regarding the use of this medicine in children. Special care may be needed.  Overdosage: If you think you have taken too much of this medicine contact a poison control center or emergency room at once.  NOTE: This medicine is only for you. Do not share this medicine with others.  What if I miss a dose?  If you miss a  dose, use it as soon as you can. If it is almost time for your next dose, use only that dose. Do not use double or extra doses.  What may interact with this medicine?    anti-infectives like chloroquine and pentamidine    caffeine    cisapride    diuretics    medicines for colds    medicines for depression or for emotional or psychotic conditions    medicines for weight loss including some herbal products    methadone    some antibiotics like clarithromycin, erythromycin, levofloxacin, and linezolid    some heart medicines    steroid hormones like dexamethasone, cortisone, hydrocortisone    theophylline    thyroid hormones  This list may not describe all possible interactions. Give your health care provider a list of all the medicines, herbs, non-prescription drugs, or dietary supplements you use. Also tell them if you smoke, drink alcohol, or use illegal drugs. Some items may interact with your medicine.  What should I watch for while using this medicine?  Tell your doctor or health care professional if your symptoms do not improve. Do not use extra albuterol. If your asthma or bronchitis gets worse while you are using this medicine, call your doctor right away.  If your mouth gets dry try chewing sugarless gum or sucking hard candy. Drink water as directed.  What side effects may I notice from receiving this medicine?  Side effects that you should report to your doctor or health care professional as soon as possible:    allergic reactions like skin rash, itching or hives, swelling of the face, lips, or tongue    breathing problems    chest pain    feeling faint or lightheaded, falls    high blood pressure    irregular heartbeat    fever    muscle cramps or weakness    pain, tingling, numbness in the hands or feet    vomiting  Side effects that usually do not require medical attention (report to your doctor or health care professional if they continue or are bothersome):    cough    difficulty  sleeping    headache    nervousness or trembling    stomach upset    stuffy or runny nose    throat irritation    unusual taste  This list may not describe all possible side effects. Call your doctor for medical advice about side effects. You may report side effects to FDA at 5-478-SNI-0446.  Where should I keep my medicine?  Keep out of the reach of children.  Store at room temperature between 15 and 30 degrees C (59 and 86 degrees F). The contents are under pressure and may burst when exposed to heat or flame. Do not freeze. This medicine does not work as well if it is too cold. Throw away any unused medicine after the expiration date. Inhalers need to be thrown away after the labeled number of puffs have been used or by the expiration date; whichever comes first. Ventolin HFA should be thrown away 12 months after removing from foil pouch. Check the instructions that come with your medicine.  NOTE: This sheet is a summary. It may not cover all possible information. If you have questions about this medicine, talk to your doctor, pharmacist, or health care provider.  NOTE:This sheet is a summary. It may not cover all possible information. If you have questions about this medicine, talk to your doctor, pharmacist, or health care provider. Copyright  2016 [employer]

## 2017-06-11 NOTE — ED NOTES
Pt cont to have significant trouble breathing w/o any relief from ativan/fentanyl.  MD aware.  Waiting for CT scan.

## 2017-06-12 PROBLEM — R91.1 PULMONARY NODULE: Status: ACTIVE | Noted: 2017-06-12

## 2017-06-12 PROBLEM — D72.828 NEUTROPHILIC LEUKOCYTOSIS: Status: ACTIVE | Noted: 2017-06-12

## 2017-06-12 PROBLEM — E66.9 OBESITY: Status: ACTIVE | Noted: 2017-06-11

## 2017-06-12 LAB
ANION GAP SERPL CALCULATED.3IONS-SCNC: 9 MMOL/L (ref 3–14)
BACTERIA SPEC CULT: NORMAL
BASE EXCESS BLDV CALC-SCNC: 1.2 MMOL/L
BUN SERPL-MCNC: 21 MG/DL (ref 7–30)
CALCIUM SERPL-MCNC: 7.9 MG/DL (ref 8.5–10.1)
CHLORIDE SERPL-SCNC: 103 MMOL/L (ref 94–109)
CO2 SERPL-SCNC: 27 MMOL/L (ref 20–32)
CREAT SERPL-MCNC: 0.92 MG/DL (ref 0.66–1.25)
ERYTHROCYTE [DISTWIDTH] IN BLOOD BY AUTOMATED COUNT: 14.2 % (ref 10–15)
GFR SERPL CREATININE-BSD FRML MDRD: 85 ML/MIN/1.7M2
GLUCOSE BLDC GLUCOMTR-MCNC: 124 MG/DL (ref 70–99)
GLUCOSE BLDC GLUCOMTR-MCNC: 141 MG/DL (ref 70–99)
GLUCOSE BLDC GLUCOMTR-MCNC: 166 MG/DL (ref 70–99)
GLUCOSE SERPL-MCNC: 158 MG/DL (ref 70–99)
HCO3 BLDV-SCNC: 26 MMOL/L (ref 21–28)
HCT VFR BLD AUTO: 42.8 % (ref 40–53)
HGB BLD-MCNC: 13.8 G/DL (ref 13.3–17.7)
LACTATE BLD-SCNC: 2.7 MMOL/L (ref 0.7–2.1)
MCH RBC QN AUTO: 31.1 PG (ref 26.5–33)
MCHC RBC AUTO-ENTMCNC: 32.2 G/DL (ref 31.5–36.5)
MCV RBC AUTO: 96 FL (ref 78–100)
MICRO REPORT STATUS: NORMAL
O2/TOTAL GAS SETTING VFR VENT: 40 %
PCO2 BLDV: 42 MM HG (ref 40–50)
PH BLDV: 7.4 PH (ref 7.32–7.43)
PLATELET # BLD AUTO: 405 10E9/L (ref 150–450)
PO2 BLDV: 47 MM HG (ref 25–47)
POTASSIUM SERPL-SCNC: 4.2 MMOL/L (ref 3.4–5.3)
RBC # BLD AUTO: 4.44 10E12/L (ref 4.4–5.9)
SODIUM SERPL-SCNC: 139 MMOL/L (ref 133–144)
SPECIMEN SOURCE: NORMAL
WBC # BLD AUTO: 15.8 10E9/L (ref 4–11)

## 2017-06-12 PROCEDURE — 25000132 ZZH RX MED GY IP 250 OP 250 PS 637: Performed by: INTERNAL MEDICINE

## 2017-06-12 PROCEDURE — 00000146 ZZHCL STATISTIC GLUCOSE BY METER IP

## 2017-06-12 PROCEDURE — 25000128 H RX IP 250 OP 636: Performed by: FAMILY MEDICINE

## 2017-06-12 PROCEDURE — 36415 COLL VENOUS BLD VENIPUNCTURE: CPT | Performed by: FAMILY MEDICINE

## 2017-06-12 PROCEDURE — 80048 BASIC METABOLIC PNL TOTAL CA: CPT | Performed by: FAMILY MEDICINE

## 2017-06-12 PROCEDURE — 12000007 ZZH R&B INTERMEDIATE

## 2017-06-12 PROCEDURE — 83605 ASSAY OF LACTIC ACID: CPT | Performed by: FAMILY MEDICINE

## 2017-06-12 PROCEDURE — 99207 ZZC CDG-CHARGE REQUIRED MANUAL ENTRY: CPT | Performed by: PEDIATRICS

## 2017-06-12 PROCEDURE — 82803 BLOOD GASES ANY COMBINATION: CPT | Performed by: FAMILY MEDICINE

## 2017-06-12 PROCEDURE — 25000125 ZZHC RX 250: Performed by: FAMILY MEDICINE

## 2017-06-12 PROCEDURE — 25000125 ZZHC RX 250: Performed by: PEDIATRICS

## 2017-06-12 PROCEDURE — 99232 SBSQ HOSP IP/OBS MODERATE 35: CPT | Performed by: PEDIATRICS

## 2017-06-12 PROCEDURE — 25000132 ZZH RX MED GY IP 250 OP 250 PS 637: Performed by: PEDIATRICS

## 2017-06-12 PROCEDURE — 85027 COMPLETE CBC AUTOMATED: CPT | Performed by: FAMILY MEDICINE

## 2017-06-12 RX ORDER — CALCIUM CARBONATE 500 MG/1
500 TABLET, CHEWABLE ORAL 3 TIMES DAILY PRN
Status: DISCONTINUED | OUTPATIENT
Start: 2017-06-12 | End: 2017-06-13 | Stop reason: HOSPADM

## 2017-06-12 RX ORDER — HYDROCODONE BITARTRATE AND ACETAMINOPHEN 5; 325 MG/1; MG/1
1 TABLET ORAL EVERY 6 HOURS PRN
Status: DISCONTINUED | OUTPATIENT
Start: 2017-06-12 | End: 2017-06-13 | Stop reason: HOSPADM

## 2017-06-12 RX ORDER — PREDNISONE 20 MG/1
40 TABLET ORAL DAILY
Status: DISCONTINUED | OUTPATIENT
Start: 2017-06-12 | End: 2017-06-13 | Stop reason: HOSPADM

## 2017-06-12 RX ORDER — ACETAMINOPHEN 500 MG
1000 TABLET ORAL EVERY 6 HOURS PRN
Status: DISCONTINUED | OUTPATIENT
Start: 2017-06-12 | End: 2017-06-13 | Stop reason: HOSPADM

## 2017-06-12 RX ORDER — IBUPROFEN 800 MG/1
800 TABLET, FILM COATED ORAL EVERY 6 HOURS PRN
Status: DISCONTINUED | OUTPATIENT
Start: 2017-06-12 | End: 2017-06-13 | Stop reason: HOSPADM

## 2017-06-12 RX ADMIN — HYDROCODONE BITARTRATE AND ACETAMINOPHEN 1 TABLET: 5; 325 TABLET ORAL at 22:16

## 2017-06-12 RX ADMIN — ALBUTEROL SULFATE 2.5 MG: 2.5 SOLUTION RESPIRATORY (INHALATION) at 02:13

## 2017-06-12 RX ADMIN — SODIUM CHLORIDE: 9 INJECTION, SOLUTION INTRAVENOUS at 08:04

## 2017-06-12 RX ADMIN — PREDNISONE 40 MG: 20 TABLET ORAL at 15:00

## 2017-06-12 RX ADMIN — METHYLPREDNISOLONE SODIUM SUCCINATE 125 MG: 125 INJECTION, POWDER, FOR SOLUTION INTRAMUSCULAR; INTRAVENOUS at 08:27

## 2017-06-12 RX ADMIN — IPRATROPIUM BROMIDE AND ALBUTEROL SULFATE 3 ML: .5; 3 SOLUTION RESPIRATORY (INHALATION) at 08:00

## 2017-06-12 RX ADMIN — IPRATROPIUM BROMIDE AND ALBUTEROL SULFATE 3 ML: .5; 3 SOLUTION RESPIRATORY (INHALATION) at 14:00

## 2017-06-12 RX ADMIN — IPRATROPIUM BROMIDE AND ALBUTEROL SULFATE 3 ML: .5; 3 SOLUTION RESPIRATORY (INHALATION) at 00:32

## 2017-06-12 RX ADMIN — ALBUTEROL SULFATE 2.5 MG: 2.5 SOLUTION RESPIRATORY (INHALATION) at 10:13

## 2017-06-12 RX ADMIN — IPRATROPIUM BROMIDE AND ALBUTEROL SULFATE 3 ML: .5; 3 SOLUTION RESPIRATORY (INHALATION) at 22:16

## 2017-06-12 RX ADMIN — IBUPROFEN 800 MG: 800 TABLET ORAL at 22:16

## 2017-06-12 RX ADMIN — METHYLPREDNISOLONE SODIUM SUCCINATE 125 MG: 125 INJECTION, POWDER, FOR SOLUTION INTRAMUSCULAR; INTRAVENOUS at 00:33

## 2017-06-12 RX ADMIN — IPRATROPIUM BROMIDE AND ALBUTEROL SULFATE 3 ML: .5; 3 SOLUTION RESPIRATORY (INHALATION) at 18:15

## 2017-06-12 RX ADMIN — IPRATROPIUM BROMIDE AND ALBUTEROL SULFATE 3 ML: .5; 3 SOLUTION RESPIRATORY (INHALATION) at 04:19

## 2017-06-12 RX ADMIN — CALCIUM CARBONATE (ANTACID) CHEW TAB 500 MG 500 MG: 500 CHEW TAB at 09:59

## 2017-06-12 NOTE — PROGRESS NOTES
[facility]    Sepsis Evaluation Progress Note    Date of Service: 06/12/2017    I was called to see Eliseo Davis due to abnormal vital signs triggering the Sepsis SIRS screening alert. He is not known to have an infection.     Physical Exam    Vital Signs:  Temp: 97.8  F (36.6  C) Temp src: Oral BP: 134/62 Pulse: 114 Heart Rate: 109 Resp: 18 SpO2: 91 % O2 Device: None (Room air) Oxygen Delivery: 2 LPM    Lab:  Lactic Acid   Date Value Ref Range Status   06/12/2017 2.7 (H) 0.7 - 2.1 mmol/L Final     Comment:     Significant value called to and read back by   BEAU RAMIREZ BY TS @ 1702         The patient is at baseline mental status.    The rest of their physical exam is significant for normal heart and lung exam.    Assessment and Plan    The SIRS and exam findings are likely due to steroids and frequent nebs, there is no sign of sepsis at this time.    Disposition: The patient has an underlying condition of asthma requiring ongoing nebs and steroids that will continue to affect their vital signs and should not have further labs drawn to assess sepsis unless their clinical appearance changes.    Oniel Mccauley MD, MD

## 2017-06-12 NOTE — H&P
[facility]    History and Physical  Hospitalist       Date of Admission:  6/11/2017  Date of Service (when I saw the patient): 06/11/17    Assessment & Plan   Eliseo Davis is a 56 year old male who presents with increasing shortness of breath today. Have been treated for pneumonia on June 5 and sent home from the emergency department with a course of a Zithromax and prednisone for 5 days. He finished that yesterday and today has had more shortness of breath. On arrival he was quite dyspneic and was started on BiPAP support in the emergency department. Chest x-ray was normal, D dimer was elevated with subsequent pulmonary CT being normal. With steroids and nebs and BiPAP support he is feeling better. He has a history of asthma in the past, but never anything like this. 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 Patient's case had been discussed by the ER physician with pulmonology and their thinking he can safely stay here with current treatment. Patient will be admitted to the ICU with continued BiPAP support with scheduled nebulization and PRN nebulization's with IV steroids. At this point there is no sign of an infectious process, so will hold any antibiotics. Could be that he has COPD with an exacerbation, all the least not had previous [place] a function tests. Consider also that it's an inflammatory reaction to the previous pneumonia that just hasn't cleared yet and with his history of chemical exposure, certainly could be some type of chemical pneumonitis. Expect he'll be in the hospital for at least 2 days. If he worsens, he will likely need transferred to [facility]. In the emergency department he was experiencing some anxiety which was relieved with use of fentanyl. In the ICU will start him on a precedex infusion to control his anxiety.    Active Problems:    Acute respiratory failure with hypoxia (H)    Reactive airway  disease with wheezing with acute exacerbation    DVT Prophylaxis: Pneumatic Compression Devices  Code Status: Full Code    Disposition: Expected discharge in 2 days once feeling better.    Donell Barros MD    Primary Care Physician   None Doctor    Chief Complaint   56-year-old male with increasing shortness of breath and difficulty breathing    History is obtained from the patient, electronic health record and emergency department physician    History of Present Illness   Eliseo Davis is a 56 year old male who presents with increasing shortness of breath. He been treated for pneumonia on June 5 in this emergency department and sent home on a Zithromax and a five-day course of prednisone. Was feeling better until today with increasing shortness of breath and dyspnea. There was no chest pain, intermittent dry cough, he has a history of asthma as a child, history of previous smoking. He does agricultural chemicals for a living. He denies fever, chills, nausea or vomiting, GI or urological symptoms.     Past Medical History    I have reviewed this patient's medical history and updated it with pertinent information if needed.   Past Medical History:   Diagnosis Date     CARDIOVASCULAR SCREENING; LDL GOAL LESS THAN 130 9/25/2013     Uncomplicated asthma        Past Surgical History   I have reviewed this patient's surgical history and updated it with pertinent information if needed.  History reviewed. No pertinent surgical history.    Prior to Admission Medications   Prior to Admission Medications   Prescriptions Last Dose Informant Patient Reported? Taking?   azithromycin (ZITHROMAX Z-FARIHA) 250 MG tablet Past Week at Unknown time  No Yes   Sig: Two tablets on the first day, then one tablet daily for the next 4 days   fluticasone (VERAMYST) 27.5 MCG/SPRAY spray Unknown at Unknown time  Yes No   Sig: Spray 2 sprays into both nostrils daily   ibuprofen (ADVIL,MOTRIN) 800 MG tablet 6/10/2017 at 1200  No Yes   Sig: Take  1 tablet (800 mg) by mouth every 8 hours as needed for moderate pain   ipratropium - albuterol 0.5 mg/2.5 mg/3 mL (DUONEB) 0.5-2.5 (3) MG/3ML neb solution 6/11/2017 at 1100  No Yes   Sig: Take 1 vial (3 mLs) by nebulization every 4 hours as needed for shortness of breath / dyspnea or wheezing   predniSONE (DELTASONE) 20 MG tablet 6/10/2017 at 2100  No Yes   Sig: Take two tablets (= 40mg) each day for 5 (five) days      Facility-Administered Medications: None     Allergies   Allergies   Allergen Reactions     Dust Mites      Penicillins      Pollen Extract        Social History   I have reviewed this patient's social history and updated it with pertinent information if needed. Eliseo Davis  reports that he has quit smoking. His smoking use included Cigarettes. He has a 35.00 pack-year smoking history. He quit smokeless tobacco use about 5 months ago. He reports that he drinks alcohol. He reports that he uses illicit drugs, including Marijuana.    Family History   I have reviewed this patient's family history and updated it with pertinent information if needed.   No family history on file.    Review of Systems   The 10 point Review of Systems is negative other than noted in the HPI or here.     Physical Exam   Temp: 97.6  F (36.4  C) Temp src: Temporal BP: (!) 139/95 Pulse: 113 Heart Rate: 133 Resp: (!) 31 SpO2: 96 % O2 Device: BiPAP/CPAP Oxygen Delivery: 2 LPM  Vital Signs with Ranges  Temp:  [96.9  F (36.1  C)-97.6  F (36.4  C)] 97.6  F (36.4  C)  Pulse:  [113] 113  Heart Rate:  [101-142] 133  Resp:  [24-42] 31  BP: (120-171)/() 139/95  FiO2 (%):  [40 %-60 %] 40 %  SpO2:  [92 %-98 %] 96 %  240 lbs 0 oz    EXAM:  Constitutional: alert, moderate distress, cooperative and on BiPAP support   Cardiovascular: PMI normal. No lifts, heaves, or thrills. RRR. No murmurs, clicks gallops or rub  Respiratory: lung show diffuse wheezing with prolonged expiration.  Psychiatric: mentation appears normal and affect  normal/bright  Head: Normocephalic. No masses, lesions, tenderness or abnormalities  Neck: Neck supple. No adenopathy. Thyroid symmetric, normal size,  ENT: ENT exam normal, no neck nodes or sinus tenderness  Abdomen: Abdomen soft, non-tender. BS normal. No masses, organomegaly  NEURO:  Reflexes normal and symmetric. Sensation grossly WNL.  SKIN: no suspicious lesions or rashes  LYMPH: Normal cervical lymph nodes  JOINT/EXTREMITIES: extremities normal- no gross deformities noted and normal muscle tone     Data   Data reviewed today:  I personally reviewed the chest x-ray image(s) showing No signs of infiltrate and the chest CT image(s) showing Per radiology showing no signs of pulmonary embolism or infiltrate.    Recent Labs  Lab 06/11/17  1220 06/05/17  2305   WBC 14.5* 13.1*   HGB 15.8 14.4   MCV 95 95    380    142   POTASSIUM 4.2 3.5   CHLORIDE 103 108   CO2 27 26   BUN 16 11   CR 1.19 1.07   ANIONGAP 9 8   LAUREN 8.7 8.8   GLC 96 106*   TROPI <0.015The 99th percentile for upper reference range is 0.045 ug/L.  Troponin values in the range of 0.045 - 0.120 ug/L may be associated with risks of adverse clinical events.  --        Recent Results (from the past 24 hour(s))   XR Chest Port 1 View    Narrative    XR CHEST PORT 1 VW   6/11/2017 1:01 PM     HISTORY: shortness of breath- recent pneumonia    COMPARISON: None.      Impression    IMPRESSION: No acute abnormality.     TOSHIA MORALES MD   Chest CT - IV contrast only - PE protocoll    Narrative    CT CHEST PULMONARY EMBOLISM WITH CONTRAST   6/11/2017 2:28 PM     HISTORY: Tachypnea, tachycardia, elevated D-dimer.    TECHNIQUE:  CT of the chest was performed following the administration  of 80mL, Isovue-370. Radiation dose for this scan was reduced using  automated exposure control, adjustment of the mA and/or kV according  to patient size, or iterative reconstruction technique.    COMPARISON: None.    FINDINGS:  There is no evidence for a pulmonary  embolism. There is no  evidence for a thoracic aortic dissection. Mild soft plaque/wall  thickening at the origin of the left subclavian artery. This does not  contribute to a significant stenosis.    There are multiple normal-sized less than 1 cm mediastinal lymph  nodes. There are calcified mediastinal and right hilar lymph nodes  consistent with old granulomatous disease.    There are calcified granulomas in the spleen.    There is a 5 mm nodule in the medial aspects of the right upper lobe  on image 43 series 6.      Impression    IMPRESSION:  1. No evidence for pulmonary embolism.  2. Changes of old granulomatous disease in the mediastinum, right  hilum and spleen.  3. 5 mm nodule in the right upper lobe.    Recommendations for one or multiple incidental lung nodules < 6mm :    Low risk patients: No routine follow-up.    High risk patients: Optional follow-up CT at 12 months; if  unchanged, no further follow-up.    *Low Risk: Minimal or absent history of smoking or other known risk  factors.  *Nonsolid (ground glass) or partly solid nodules may require longer  follow-up to exclude indolent adenocarcinoma.  *Recommendations based on Guidelines for the Management of Incidental  Pulmonary Nodules Detected at CT: From the Fleischner Society 2017,  Radiology 2017.

## 2017-06-12 NOTE — PROGRESS NOTES
S-(situation): Patient arrives to room 217 via wheelchair from ED    B-(background): Respiratory failure with hypoxia    A-(assessment): lung sounds very diminished with expiratory wheezes throughout, on Bi-Pap upon arrival with FiO2 at 40% sats mid 90's, , alert and oriented able to small talk    R-(recommendations): Orders reviewed with patient and his sister. Will monitor patient per MD orders.     Inpatient nursing criteria listed below were met:    Health care directives status obtained and documented: Yes  Core Measures assessed (SSI): Yes  SCD's Documented: Yes  Vaccine assessment done and vaccines ordered if appropriate: Yes  Skin issues/needs documented:NA  Isolation needs addressed, if appropriate: NA  Fall Prevention: Care plan updated, Education given and documented Yes  MRSA swab completed for patient 55 years and older (exclude RADHA and TKA): Yes  My Chart patient sign up addressed and documented: Yes  Care Plan initiated: Yes  Education Assessment documented:Yes  Education Documented (Pre-existing chronic infection such as, MRSA/VRE need education on admission): Yes  New medication patient education completed and documented (Possible Side Effects of Common Medications handout): Yes  Home medications if not able to send immediately home with family stored here: NA   Reminder note placed in discharge instructions: NA  Discharge planning review completed (admission navigator) Yes

## 2017-06-12 NOTE — DOWNTIME EVENT NOTE
The EMR was down for 5 hours on 6/12/2017.    Zamzam Castro RN was responsible for completing the paper charting during this time period.     The following information was re-entered into the system by Curtis Boykin: Orders and MAR    The following information will remain in the paper chart: Same as above    Curtis Boykin  6/12/2017

## 2017-06-12 NOTE — ED NOTES
Patient reports chest tightness has resolved since IV Fentanyl. Neb also finished and patients work of breathing improved. 's (down from 137). 02 97%. Awaiting bed placement at this time. Patient and sister aware of plan.

## 2017-06-12 NOTE — PROGRESS NOTES
DATE:  6/12/2017   TIME OF RECEIPT FROM LAB:  4952  LAB TEST:  lactic  LAB VALUE:  2.7  RESULTS GIVEN WITH READ-BACK TO (PROVIDER):  Katherin KAM LAB VALUE REPORTED TO PROVIDER:   9980

## 2017-06-12 NOTE — PLAN OF CARE
Problem: Goal Outcome Summary  Goal: Goal Outcome Summary  Outcome: Improving  Patient transitioned off Bi-Pap around 0200 and onto oxymizer at 4L maintaining sats mid 90's, HR in low 100's, Lung sounds diminished with expiratory wheezes throughout, out of bed independently, tolerating clear liquids advanced to regular for breakfast, alert and oriented

## 2017-06-12 NOTE — PROVIDER NOTIFICATION
MD notified of blood sugar at 166 per order. Will continue to monitor Q4 hours and if sugars remain elevated will initiate insulin.

## 2017-06-13 VITALS
HEIGHT: 69 IN | WEIGHT: 231.48 LBS | OXYGEN SATURATION: 93 % | BODY MASS INDEX: 34.29 KG/M2 | RESPIRATION RATE: 18 BRPM | TEMPERATURE: 97.9 F | HEART RATE: 102 BPM | SYSTOLIC BLOOD PRESSURE: 104 MMHG | DIASTOLIC BLOOD PRESSURE: 59 MMHG

## 2017-06-13 PROBLEM — L50.9 URTICARIA: Status: ACTIVE | Noted: 2017-06-13

## 2017-06-13 PROCEDURE — 25000132 ZZH RX MED GY IP 250 OP 250 PS 637: Performed by: PEDIATRICS

## 2017-06-13 PROCEDURE — 25000125 ZZHC RX 250: Performed by: FAMILY MEDICINE

## 2017-06-13 PROCEDURE — 25000125 ZZHC RX 250: Performed by: PEDIATRICS

## 2017-06-13 PROCEDURE — 99238 HOSP IP/OBS DSCHRG MGMT 30/<: CPT | Performed by: PEDIATRICS

## 2017-06-13 PROCEDURE — 25000132 ZZH RX MED GY IP 250 OP 250 PS 637: Performed by: INTERNAL MEDICINE

## 2017-06-13 RX ORDER — PREDNISONE 20 MG/1
40 TABLET ORAL DAILY
Qty: 10 TABLET | Refills: 0 | Status: SHIPPED | OUTPATIENT
Start: 2017-06-14 | End: 2017-06-19

## 2017-06-13 RX ORDER — ALBUTEROL SULFATE 90 UG/1
2 AEROSOL, METERED RESPIRATORY (INHALATION) 4 TIMES DAILY
Qty: 1 INHALER | Refills: 0 | Status: SHIPPED | OUTPATIENT
Start: 2017-06-13

## 2017-06-13 RX ORDER — DIPHENHYDRAMINE HCL 25 MG
50 CAPSULE ORAL EVERY 6 HOURS PRN
Status: DISCONTINUED | OUTPATIENT
Start: 2017-06-13 | End: 2017-06-13 | Stop reason: HOSPADM

## 2017-06-13 RX ORDER — ACETAMINOPHEN 500 MG
1000 TABLET ORAL EVERY 6 HOURS PRN
COMMUNITY
Start: 2017-06-13 | End: 2019-07-31

## 2017-06-13 RX ORDER — DIPHENHYDRAMINE HCL 50 MG
50 CAPSULE ORAL EVERY 6 HOURS PRN
Qty: 56 CAPSULE | COMMUNITY
Start: 2017-06-13 | End: 2019-07-31

## 2017-06-13 RX ADMIN — PREDNISONE 40 MG: 20 TABLET ORAL at 09:16

## 2017-06-13 RX ADMIN — IPRATROPIUM BROMIDE AND ALBUTEROL SULFATE 3 ML: .5; 3 SOLUTION RESPIRATORY (INHALATION) at 02:09

## 2017-06-13 RX ADMIN — GUAIFENESIN 10 ML: 100 SOLUTION ORAL at 02:09

## 2017-06-13 RX ADMIN — DIPHENHYDRAMINE HYDROCHLORIDE 50 MG: 25 CAPSULE ORAL at 10:07

## 2017-06-13 NOTE — PROGRESS NOTES
SPIRITUAL HEALTH SERVICES  SPIRITUAL ASSESSMENT Progress Note  [facility]      Pt declined a visit from .   is available for pt/family needs.    Eben Lucia M.Div., [facility]  Staff   Office tel: [phone]

## 2017-06-13 NOTE — PLAN OF CARE
Problem: Goal Outcome Summary  Goal: Goal Outcome Summary  Outcome: No Change  S-(situation): end of shift note     B-(background): respiratory distress     A-(assessment): on 2l/nc, weaned off with o2 sats 90-94% on RA, with activity sats increased 92-95% and patient reported COTTER and appeared SOA.  Increased anxiety with oxygen off and patient reported feeling SOA and unable to breathe (on RA o2 sats 90-94%), reapplied o2 PRN per patient comfort and increased SOA.  Patient ambulated to nurses station 100feet and reported feeling like he was choking due to rib pain and ambulated back to room and reports bilateral side and back rib pain d/t coughing, requesting additional analgesic, reports increased SOA, patient reported ibuprofen did not work.  Rec'd order for norco - administered with ibuprofen.        R-(recommendations): Continue to monitor o2 sats and rib pain

## 2017-06-13 NOTE — PLAN OF CARE
Problem: Goal Outcome Summary  Goal: Goal Outcome Summary  Outcome: Improving  VSS, remains on RA.  Pt requested Rx for cough, obtained order for robitussin and given.  Denies pain.

## 2017-06-13 NOTE — DISCHARGE SUMMARY
[facility]    Discharge Summary  Hospitalist    Date of Admission:  6/11/2017  Date of Discharge:  6/13/2017  Discharging Provider: Nuno Pandey  Date of Service (when I saw the patient): 06/13/17    Discharge Diagnoses     Reactive airway disease with wheezing with acute exacerbation    Acute respiratory failure with hypoxia (H)    Neutrophilic leukocytosis    Pulmonary nodule - 5 mm RUL (high risk pt)    Urticaria    Obesity    * No resolved hospital problems. *      History of Present Illness   Eliseo Davis is an 56 year old male with vague history of possible asthma who presented with one-day history of worsening shortness of breath after he had been treated with prednisone and Zithromax a week previously for respiratory illness.   Due to concern for severe exacerbation of reactive airways disease with acute respiratory failure, he was hospitalized. See admission history and physical for details.    Hospital Course   Eliseo Davis was admitted on 6/11/2017.  The following problems were addressed during his hospitalization:    Problem #1 acute exacerbation of reactive airways disease with acute hypoxic respiratory failure.  No infiltrates were present on chest x-ray or chest CT, and chest CT was negative for pulmonary embolism.  He was treated with corticosteroids and bronchodilators.  He was severely hypoxic and initially required noninvasive positive pressure ventilation and oxygen therapy.  He improved and weaned off of oxygen therapy by discharge.  He tolerated advancing diet and activity.  After investigation, acute exacerbation of reactive airways disease was suspected although it remained unclear whether he had an underlying chronic diagnosis of asthma versus COPD.  Outpatient follow-up for further evaluation of chronic lung disease was recommended after recovery from this acute illness.  Avoidance of triggers was recommended.    Problem #2 right upper lobe pulmonary nodule.  " Incidental note was made of a 5 mm right upper lobe pulmonary nodule.  With his history of tobacco use, this is considered to be within a high risk category, and repeat chest CT in 12 months was recommended by radiology.    Problem #3 urticaria.  He developed urticaria during his hospital stay but no signs of angioedema and his respiratory status did not deteriorate at that time.  He was hemodynamically stable.  Symptomatic treatment with continued corticosteroids and use of antihistamines as needed was recommended.  Onset of urticaria coincided with administration of narcotic analgesics (Norco), so urticaria as a side effect of Norco was possible.  Norco was discontinued.    Nuno Pandey MD    Significant Results and Procedures   No procedures performed during this admission    Pending Results    None    Code Status   Full Code       Primary Care Physician   None Doctor    Physical Exam   231 lbs 7.73 oz  /59  Pulse 102  Temp 97.9  F (36.6  C) (Oral)  Resp 18  Ht 1.753 m (5' 9\")  Wt 105 kg (231 lb 7.7 oz)  SpO2 93%  BMI 34.18 kg/m2    No acute distress  Normal respiratory effort, diminished breath sounds throughout, clear lungs  Mildly tachycardic with regular rhythm, good radial pulse with normal capillary refill  Few urticaria present on the hands and forearms as well as the inner thighs, no angioedema of the hands or feet    Discharge Disposition   Discharged to home  Condition at discharge: Stable    Consultations This Hospital Stay   None    Time Spent on this Encounter   I, Nuno Pandey, personally saw the patient today and spent less than or equal to 30 minutes discharging this patient.    Discharge Orders     Reason for your hospital stay   Hospitalized for breathing problems and improved     Follow-up and recommended labs and tests    Follow up with primary care provider within 2 weeks for hospital follow- up. The following labs/tests are recommended: PFTs and repeat chest CT to " re-evaluate lung nodule in 12 months.     Activity   Your activity upon discharge: activity as tolerated     Full Code     Diet   Follow this diet upon discharge: Orders Placed This Encounter     Advance Diet as Tolerated: Regular Diet Adult; Regular Diet Adult       Discharge Medications   Current Discharge Medication List      START taking these medications    Details   acetaminophen (TYLENOL) 500 MG tablet Take 2 tablets (1,000 mg) by mouth every 6 hours as needed for mild pain    Associated Diagnoses: Chest wall pain      diphenhydrAMINE (BENADRYL) 50 MG capsule Take 1 capsule (50 mg) by mouth every 6 hours as needed for itching  Qty: 56 capsule    Associated Diagnoses: Urticaria      albuterol (PROAIR HFA/PROVENTIL HFA/VENTOLIN HFA) 108 (90 BASE) MCG/ACT Inhaler Inhale 2 puffs into the lungs 4 times daily For 5 days, then use 2 puffs every 4 hours as needed  Qty: 1 Inhaler, Refills: 0    Associated Diagnoses: Reactive airway disease with wheezing with acute exacerbation         CONTINUE these medications which have CHANGED    Details   predniSONE (DELTASONE) 20 MG tablet Take 2 tablets (40 mg) by mouth daily for 5 days  Qty: 10 tablet, Refills: 0    Associated Diagnoses: Urticaria; Reactive airway disease with wheezing with acute exacerbation         CONTINUE these medications which have NOT CHANGED    Details   ipratropium - albuterol 0.5 mg/2.5 mg/3 mL (DUONEB) 0.5-2.5 (3) MG/3ML neb solution Take 1 vial (3 mLs) by nebulization every 4 hours as needed for shortness of breath / dyspnea or wheezing  Qty: 75 mL, Refills: 0      ibuprofen (ADVIL,MOTRIN) 800 MG tablet Take 1 tablet (800 mg) by mouth every 8 hours as needed for moderate pain  Qty: 90 tablet, Refills: 1    Associated Diagnoses: LBP (low back pain)      fluticasone (VERAMYST) 27.5 MCG/SPRAY spray Spray 2 sprays into both nostrils daily         STOP taking these medications       azithromycin (ZITHROMAX Z-FARIHA) 250 MG tablet Comments:   Reason for  Stopping:             Allergies   Allergies   Allergen Reactions     Dust Mites      Penicillins      Pollen Extract      Data   Most Recent 3 CBC's:  Recent Labs   Lab Test  06/12/17   0518  06/11/17   1220  06/05/17   2305   WBC  15.8*  14.5*  13.1*   HGB  13.8  15.8  14.4   MCV  96  95  95   PLT  405  444  380      Most Recent 3 BMP's:  Recent Labs   Lab Test  06/12/17   0518  06/11/17   1220  06/05/17   2305   NA  139  139  142   POTASSIUM  4.2  4.2  3.5   CHLORIDE  103  103  108   CO2  27  27  26   BUN  21  16  11   CR  0.92  1.19  1.07   ANIONGAP  9  9  8   LAUREN  7.9*  8.7  8.8   GLC  158*  96  106*     Most Recent 3 Troponin's:  Recent Labs   Lab Test  06/11/17   1220   TROPI  <0.015  The 99th percentile for upper reference range is 0.045 ug/L.  Troponin values in   the range of 0.045 - 0.120 ug/L may be associated with risks of adverse   clinical events.       Most Recent 6 Bacteria Isolates From Any Culture (See EPIC Reports for Culture Details):  Recent Labs   Lab Test  06/11/17   2335   CULT  No MRSA isolated       Results for orders placed or performed during the hospital encounter of 06/11/17   XR Chest Port 1 View    Narrative    XR CHEST PORT 1 VW   6/11/2017 1:01 PM     HISTORY: shortness of breath- recent pneumonia    COMPARISON: None.      Impression    IMPRESSION: No acute abnormality.     TOSHIA MORALES MD   Chest CT - IV contrast only - PE protocoll    Narrative    CT CHEST PULMONARY EMBOLISM WITH CONTRAST   6/11/2017 2:28 PM     HISTORY: Tachypnea, tachycardia, elevated D-dimer.    TECHNIQUE:  CT of the chest was performed following the administration  of 80mL, Isovue-370. Radiation dose for this scan was reduced using  automated exposure control, adjustment of the mA and/or kV according  to patient size, or iterative reconstruction technique.    COMPARISON: None.    FINDINGS:  There is no evidence for a pulmonary embolism. There is no  evidence for a thoracic aortic dissection. Mild soft  plaque/wall  thickening at the origin of the left subclavian artery. This does not  contribute to a significant stenosis.    There are multiple normal-sized less than 1 cm mediastinal lymph  nodes. There are calcified mediastinal and right hilar lymph nodes  consistent with old granulomatous disease.    There are calcified granulomas in the spleen.    There is a 5 mm nodule in the medial aspects of the right upper lobe  on image 43 series 6.      Impression    IMPRESSION:  1. No evidence for pulmonary embolism.  2. Changes of old granulomatous disease in the mediastinum, right  hilum and spleen.  3. 5 mm nodule in the right upper lobe.    Recommendations for one or multiple incidental lung nodules < 6mm :    Low risk patients: No routine follow-up.    High risk patients: Optional follow-up CT at 12 months; if  unchanged, no further follow-up.    *Low Risk: Minimal or absent history of smoking or other known risk  factors.  *Nonsolid (ground glass) or partly solid nodules may require longer  follow-up to exclude indolent adenocarcinoma.  *Recommendations based on Guidelines for the Management of Incidental  Pulmonary Nodules Detected at CT: From the Fleischner Society 2017,  Radiology 2017.       TOSHIA MORALES MD

## 2017-06-13 NOTE — PROGRESS NOTES
S-(situation): Patient discharged to home via car with self.    B-(background): Shortness of breath.    A-(assessment): Patient alert and oriented, VSS, RA sats 93%. Lungs sounds diminished, clear except RLL slight exp wheeze. [facility]    R-(recommendations): Discharge instructions reviewed with patient. Listed belongings gathered and returned to patient.          Discharge Nursing Criteria:     Care Plan and Patient education resolved: Yes    New Medications- pt has been educated about purpose and side effects: Yes    Vaccines  Pneumonia Vaccine verified at discharge: Yes  Influenza status verified at discharge:  Yes      MISC  Prescriptions if needed, hard copies sent with patient  No, sent to [facility]  Home and hospital aquired medications returned to patient: NA  Medication Bin checked and emptied on discharge Yes  Patient reports post-discharge pain management plan is effective: Yes

## 2017-06-15 NOTE — PROGRESS NOTES
"Eliseo Davis  Gender: male  : 1960  [address] [facility] [id number] [phone] (home)     Medical Record: [id number]  Pharmacy:    [facility] [place], [place] - [address] [facility]  Primary Care Provider: Doctor, None    Parent's names are: Data Unavailable (mother) and Data Unavailable (father).      [facility]  Nighat 15, 2017     Discharge Phone Call:  Key Words/Key Times      Introduction - AIDET (Acknowledge, Introduce, Duration, Explanation)      Empathy-   We are calling to see how you are since your recent stay in the hospital?     Call back COMMENTS: everything is going good..      Clinical Questions -  (f/u appts, medication side effects/purpose, ability to care for self at home) \"For your safety, it is important to us that you understand the purpose and side effects of your medications, can you tell me what your new medications are?\"     Call back COMMENTS: no questions or concerns      Staff Recognition -  We like to recognize staff and physicians who have done an excellent job.  Do you remember any people from your care team that you would like recognize?     Call back COMMENTS: \"the women in the ICU was really good\"      Very Good Care -  We want to provide very good care to all patients.  How was your care?     Call back COMMENTS: overall everything was really good,       Opportunities for Improvement -  Our goal is to be the best.  Do you have any suggestions for things that we could improve upon?     Call back COMMENTS:  A volunteer was in visiting with the pt and pt stated it was awkward when all the nurses and Dr came in and cut her off.       Thank You               "

## 2017-06-21 ENCOUNTER — APPOINTMENT (OUTPATIENT)
Dept: GENERAL RADIOLOGY | Facility: CLINIC | Age: 57
End: 2017-06-21
Attending: FAMILY MEDICINE
Payer: COMMERCIAL

## 2017-06-21 ENCOUNTER — HOSPITAL ENCOUNTER (EMERGENCY)
Facility: CLINIC | Age: 57
Discharge: ANOTHER HOSPITAL | End: 2017-06-22
Attending: FAMILY MEDICINE | Admitting: FAMILY MEDICINE
Payer: COMMERCIAL

## 2017-06-21 DIAGNOSIS — R07.9 ACUTE CHEST PAIN: ICD-10-CM

## 2017-06-21 DIAGNOSIS — R79.89 TROPONIN LEVEL ELEVATED: ICD-10-CM

## 2017-06-21 DIAGNOSIS — J96.01 ACUTE RESPIRATORY FAILURE WITH HYPOXIA (H): ICD-10-CM

## 2017-06-21 DIAGNOSIS — I21.4 NSTEMI (NON-ST ELEVATED MYOCARDIAL INFARCTION) (H): ICD-10-CM

## 2017-06-21 DIAGNOSIS — Z87.891 PERSONAL HISTORY OF SMOKING: ICD-10-CM

## 2017-06-21 DIAGNOSIS — R94.31 ELECTROCARDIOGRAM SHOWING T WAVE ABNORMALITIES: ICD-10-CM

## 2017-06-21 DIAGNOSIS — E87.20 ACIDOSIS: ICD-10-CM

## 2017-06-21 LAB
ALBUMIN SERPL-MCNC: 3.6 G/DL (ref 3.4–5)
ALP SERPL-CCNC: 94 U/L (ref 40–150)
ALT SERPL W P-5'-P-CCNC: 40 U/L (ref 0–70)
ANION GAP SERPL CALCULATED.3IONS-SCNC: 9 MMOL/L (ref 3–14)
AST SERPL W P-5'-P-CCNC: 24 U/L (ref 0–45)
BASE EXCESS BLDV CALC-SCNC: 0.7 MMOL/L
BASOPHILS # BLD AUTO: 0 10E9/L (ref 0–0.2)
BASOPHILS NFR BLD AUTO: 0.3 %
BILIRUB SERPL-MCNC: 0.5 MG/DL (ref 0.2–1.3)
BUN SERPL-MCNC: 12 MG/DL (ref 7–30)
CALCIUM SERPL-MCNC: 8.2 MG/DL (ref 8.5–10.1)
CHLORIDE SERPL-SCNC: 104 MMOL/L (ref 94–109)
CO2 SERPL-SCNC: 27 MMOL/L (ref 20–32)
CREAT SERPL-MCNC: 1.18 MG/DL (ref 0.66–1.25)
CRP SERPL-MCNC: 14.4 MG/L (ref 0–8)
D DIMER PPP FEU-MCNC: 0.3 UG/ML FEU (ref 0–0.5)
DIFFERENTIAL METHOD BLD: ABNORMAL
EOSINOPHIL # BLD AUTO: 0.9 10E9/L (ref 0–0.7)
EOSINOPHIL NFR BLD AUTO: 6.9 %
ERYTHROCYTE [DISTWIDTH] IN BLOOD BY AUTOMATED COUNT: 14.5 % (ref 10–15)
GFR SERPL CREATININE-BSD FRML MDRD: 64 ML/MIN/1.7M2
GLUCOSE SERPL-MCNC: 94 MG/DL (ref 70–99)
HCO3 BLDV-SCNC: 26 MMOL/L (ref 21–28)
HCT VFR BLD AUTO: 46.1 % (ref 40–53)
HGB BLD-MCNC: 14.9 G/DL (ref 13.3–17.7)
IMM GRANULOCYTES # BLD: 0.1 10E9/L (ref 0–0.4)
IMM GRANULOCYTES NFR BLD: 0.6 %
LACTATE BLD-SCNC: 3.4 MMOL/L (ref 0.7–2.1)
LYMPHOCYTES # BLD AUTO: 3.6 10E9/L (ref 0.8–5.3)
LYMPHOCYTES NFR BLD AUTO: 28.6 %
MCH RBC QN AUTO: 31.2 PG (ref 26.5–33)
MCHC RBC AUTO-ENTMCNC: 32.3 G/DL (ref 31.5–36.5)
MCV RBC AUTO: 96 FL (ref 78–100)
MONOCYTES # BLD AUTO: 1.2 10E9/L (ref 0–1.3)
MONOCYTES NFR BLD AUTO: 9.1 %
NEUTROPHILS # BLD AUTO: 6.9 10E9/L (ref 1.6–8.3)
NEUTROPHILS NFR BLD AUTO: 54.5 %
NT-PROBNP SERPL-MCNC: 46 PG/ML (ref 0–900)
O2/TOTAL GAS SETTING VFR VENT: 21 %
PCO2 BLDV: 45 MM HG (ref 40–50)
PH BLDV: 7.37 PH (ref 7.32–7.43)
PLATELET # BLD AUTO: 399 10E9/L (ref 150–450)
PO2 BLDV: 177 MM HG (ref 25–47)
POTASSIUM SERPL-SCNC: 4.2 MMOL/L (ref 3.4–5.3)
PROT SERPL-MCNC: 7.3 G/DL (ref 6.8–8.8)
RBC # BLD AUTO: 4.78 10E12/L (ref 4.4–5.9)
SODIUM SERPL-SCNC: 140 MMOL/L (ref 133–144)
TROPONIN I SERPL-MCNC: 0.39 UG/L (ref 0–0.04)
TROPONIN I SERPL-MCNC: NORMAL UG/L (ref 0–0.04)
WBC # BLD AUTO: 12.6 10E9/L (ref 4–11)

## 2017-06-21 PROCEDURE — 83880 ASSAY OF NATRIURETIC PEPTIDE: CPT | Performed by: FAMILY MEDICINE

## 2017-06-21 PROCEDURE — 96361 HYDRATE IV INFUSION ADD-ON: CPT | Performed by: FAMILY MEDICINE

## 2017-06-21 PROCEDURE — 84484 ASSAY OF TROPONIN QUANT: CPT | Performed by: FAMILY MEDICINE

## 2017-06-21 PROCEDURE — 99285 EMERGENCY DEPT VISIT HI MDM: CPT | Mod: Z6 | Performed by: FAMILY MEDICINE

## 2017-06-21 PROCEDURE — 80053 COMPREHEN METABOLIC PANEL: CPT | Performed by: FAMILY MEDICINE

## 2017-06-21 PROCEDURE — 93010 ELECTROCARDIOGRAM REPORT: CPT | Mod: 76 | Performed by: FAMILY MEDICINE

## 2017-06-21 PROCEDURE — 25000128 H RX IP 250 OP 636: Performed by: PHYSICIAN ASSISTANT

## 2017-06-21 PROCEDURE — 93005 ELECTROCARDIOGRAM TRACING: CPT | Performed by: FAMILY MEDICINE

## 2017-06-21 PROCEDURE — 82803 BLOOD GASES ANY COMBINATION: CPT | Performed by: FAMILY MEDICINE

## 2017-06-21 PROCEDURE — 85025 COMPLETE CBC W/AUTO DIFF WBC: CPT | Performed by: FAMILY MEDICINE

## 2017-06-21 PROCEDURE — 93005 ELECTROCARDIOGRAM TRACING: CPT | Mod: 76 | Performed by: FAMILY MEDICINE

## 2017-06-21 PROCEDURE — 96375 TX/PRO/DX INJ NEW DRUG ADDON: CPT | Performed by: FAMILY MEDICINE

## 2017-06-21 PROCEDURE — 71020 XR CHEST 2 VW: CPT | Mod: TC

## 2017-06-21 PROCEDURE — 96365 THER/PROPH/DIAG IV INF INIT: CPT | Performed by: FAMILY MEDICINE

## 2017-06-21 PROCEDURE — 93010 ELECTROCARDIOGRAM REPORT: CPT | Mod: Z6 | Performed by: FAMILY MEDICINE

## 2017-06-21 PROCEDURE — 85379 FIBRIN DEGRADATION QUANT: CPT | Performed by: FAMILY MEDICINE

## 2017-06-21 PROCEDURE — 99285 EMERGENCY DEPT VISIT HI MDM: CPT | Mod: 25 | Performed by: FAMILY MEDICINE

## 2017-06-21 PROCEDURE — 25000128 H RX IP 250 OP 636: Performed by: FAMILY MEDICINE

## 2017-06-21 PROCEDURE — 87040 BLOOD CULTURE FOR BACTERIA: CPT | Performed by: FAMILY MEDICINE

## 2017-06-21 PROCEDURE — 25000125 ZZHC RX 250: Performed by: FAMILY MEDICINE

## 2017-06-21 PROCEDURE — 96366 THER/PROPH/DIAG IV INF ADDON: CPT | Performed by: FAMILY MEDICINE

## 2017-06-21 PROCEDURE — 86140 C-REACTIVE PROTEIN: CPT | Performed by: FAMILY MEDICINE

## 2017-06-21 PROCEDURE — 96367 TX/PROPH/DG ADDL SEQ IV INF: CPT | Performed by: FAMILY MEDICINE

## 2017-06-21 PROCEDURE — 25000132 ZZH RX MED GY IP 250 OP 250 PS 637: Performed by: FAMILY MEDICINE

## 2017-06-21 PROCEDURE — 83605 ASSAY OF LACTIC ACID: CPT | Performed by: EMERGENCY MEDICINE

## 2017-06-21 RX ORDER — METHYLPREDNISOLONE SODIUM SUCCINATE 125 MG/2ML
125 INJECTION, POWDER, LYOPHILIZED, FOR SOLUTION INTRAMUSCULAR; INTRAVENOUS ONCE
Status: COMPLETED | OUTPATIENT
Start: 2017-06-21 | End: 2017-06-21

## 2017-06-21 RX ORDER — IPRATROPIUM BROMIDE AND ALBUTEROL SULFATE 2.5; .5 MG/3ML; MG/3ML
3 SOLUTION RESPIRATORY (INHALATION)
Status: DISCONTINUED | OUTPATIENT
Start: 2017-06-21 | End: 2017-06-22 | Stop reason: HOSPADM

## 2017-06-21 RX ORDER — SODIUM CHLORIDE 9 MG/ML
INJECTION, SOLUTION INTRAVENOUS CONTINUOUS
Status: DISCONTINUED | OUTPATIENT
Start: 2017-06-21 | End: 2017-06-21

## 2017-06-21 RX ORDER — NITROGLYCERIN 20 MG/100ML
.07-1.84 INJECTION INTRAVENOUS CONTINUOUS
Status: DISCONTINUED | OUTPATIENT
Start: 2017-06-21 | End: 2017-06-22 | Stop reason: HOSPADM

## 2017-06-21 RX ORDER — CETIRIZINE HYDROCHLORIDE 10 MG/1
10 TABLET ORAL ONCE
Status: COMPLETED | OUTPATIENT
Start: 2017-06-21 | End: 2017-06-21

## 2017-06-21 RX ORDER — SODIUM CHLORIDE 9 MG/ML
INJECTION, SOLUTION INTRAVENOUS CONTINUOUS
Status: ACTIVE | OUTPATIENT
Start: 2017-06-21 | End: 2017-06-21

## 2017-06-21 RX ORDER — MEROPENEM 1 G/1
1 INJECTION, POWDER, FOR SOLUTION INTRAVENOUS EVERY 8 HOURS
Status: DISCONTINUED | OUTPATIENT
Start: 2017-06-21 | End: 2017-06-22 | Stop reason: HOSPADM

## 2017-06-21 RX ORDER — ASPIRIN 81 MG/1
324 TABLET, CHEWABLE ORAL ONCE
Status: COMPLETED | OUTPATIENT
Start: 2017-06-21 | End: 2017-06-21

## 2017-06-21 RX ORDER — HEPARIN SODIUM 10000 [USP'U]/100ML
0-3500 INJECTION, SOLUTION INTRAVENOUS CONTINUOUS
Status: DISCONTINUED | OUTPATIENT
Start: 2017-06-21 | End: 2017-06-22 | Stop reason: HOSPADM

## 2017-06-21 RX ORDER — LIDOCAINE 40 MG/G
CREAM TOPICAL
Status: DISCONTINUED | OUTPATIENT
Start: 2017-06-21 | End: 2017-06-22 | Stop reason: HOSPADM

## 2017-06-21 RX ADMIN — MEROPENEM 1 G: 1 INJECTION, POWDER, FOR SOLUTION INTRAVENOUS at 19:23

## 2017-06-21 RX ADMIN — HEPARIN SODIUM 1200 UNITS/HR: 10000 INJECTION, SOLUTION INTRAVENOUS at 23:06

## 2017-06-21 RX ADMIN — METHYLPREDNISOLONE SODIUM SUCCINATE 125 MG: 125 INJECTION, POWDER, FOR SOLUTION INTRAMUSCULAR; INTRAVENOUS at 19:17

## 2017-06-21 RX ADMIN — CETIRIZINE HYDROCHLORIDE 10 MG: 10 TABLET, FILM COATED ORAL at 21:13

## 2017-06-21 RX ADMIN — SODIUM CHLORIDE: 9 INJECTION, SOLUTION INTRAVENOUS at 19:56

## 2017-06-21 RX ADMIN — IPRATROPIUM BROMIDE AND ALBUTEROL SULFATE 3 ML: .5; 3 SOLUTION RESPIRATORY (INHALATION) at 22:00

## 2017-06-21 RX ADMIN — SODIUM CHLORIDE: 9 INJECTION, SOLUTION INTRAVENOUS at 19:13

## 2017-06-21 RX ADMIN — IPRATROPIUM BROMIDE AND ALBUTEROL SULFATE 3 ML: .5; 3 SOLUTION RESPIRATORY (INHALATION) at 19:14

## 2017-06-21 RX ADMIN — SALINE NASAL SPRAY 1 SPRAY: 1.5 SOLUTION NASAL at 23:27

## 2017-06-21 RX ADMIN — NITROGLYCERIN 0.07 MCG/KG/MIN: 20 INJECTION INTRAVENOUS at 23:02

## 2017-06-21 RX ADMIN — NITROGLYCERIN 15 MG: 20 OINTMENT TOPICAL at 21:00

## 2017-06-21 RX ADMIN — ASPIRIN 81 MG 324 MG: 81 TABLET ORAL at 20:59

## 2017-06-21 NOTE — ED NOTES
"Shortness of breath that started this AM. Seen in ER over the past 2 weeks with similar symptoms. Pt states, \"I think I have pneumonia.\" Acute asthma exacerbation while en route to ER. Started on CPAP with good results. BIPAP currently and tolerating well.    "

## 2017-06-22 ENCOUNTER — APPOINTMENT (OUTPATIENT)
Dept: CARDIOLOGY | Facility: CLINIC | Age: 57
DRG: 282 | End: 2017-06-22
Attending: INTERNAL MEDICINE
Payer: COMMERCIAL

## 2017-06-22 ENCOUNTER — HOSPITAL ENCOUNTER (INPATIENT)
Facility: CLINIC | Age: 57
LOS: 1 days | Discharge: HOME | DRG: 282 | End: 2017-06-23
Attending: HOSPITALIST | Admitting: INTERNAL MEDICINE
Payer: COMMERCIAL

## 2017-06-22 VITALS
DIASTOLIC BLOOD PRESSURE: 97 MMHG | SYSTOLIC BLOOD PRESSURE: 139 MMHG | BODY MASS INDEX: 35.44 KG/M2 | RESPIRATION RATE: 18 BRPM | HEART RATE: 112 BPM | TEMPERATURE: 96.5 F | OXYGEN SATURATION: 97 % | WEIGHT: 240 LBS

## 2017-06-22 DIAGNOSIS — J45.901 REACTIVE AIRWAY DISEASE WITH WHEEZING WITH ACUTE EXACERBATION: ICD-10-CM

## 2017-06-22 DIAGNOSIS — J43.8 OTHER EMPHYSEMA (H): ICD-10-CM

## 2017-06-22 DIAGNOSIS — I25.10 CORONARY ARTERY DISEASE INVOLVING NATIVE CORONARY ARTERY OF NATIVE HEART WITHOUT ANGINA PECTORIS: Primary | ICD-10-CM

## 2017-06-22 PROBLEM — I24.9 ACS (ACUTE CORONARY SYNDROME) (H): Status: ACTIVE | Noted: 2017-06-22

## 2017-06-22 LAB
CHOLEST SERPL-MCNC: 192 MG/DL
ERYTHROCYTE [DISTWIDTH] IN BLOOD BY AUTOMATED COUNT: 14.3 % (ref 10–15)
HBA1C MFR BLD: 5.8 % (ref 4.3–6)
HCT VFR BLD AUTO: 42.5 % (ref 40–53)
HDLC SERPL-MCNC: 75 MG/DL
HGB BLD-MCNC: 14.4 G/DL (ref 13.3–17.7)
KCT BLD-ACNC: 149 SEC (ref 105–167)
LACTATE BLD-SCNC: 1.7 MMOL/L (ref 0.7–2.1)
LDLC SERPL CALC-MCNC: 107 MG/DL
LMWH PPP CHRO-ACNC: 0.78 IU/ML
MCH RBC QN AUTO: 31.9 PG (ref 26.5–33)
MCHC RBC AUTO-ENTMCNC: 33.9 G/DL (ref 31.5–36.5)
MCV RBC AUTO: 94 FL (ref 78–100)
NONHDLC SERPL-MCNC: 117 MG/DL
PLATELET # BLD AUTO: 333 10E9/L (ref 150–450)
PROCALCITONIN SERPL-MCNC: 0.05 NG/ML
RBC # BLD AUTO: 4.51 10E12/L (ref 4.4–5.9)
TRIGL SERPL-MCNC: 52 MG/DL
TROPONIN I SERPL-MCNC: 0.51 UG/L (ref 0–0.04)
TROPONIN I SERPL-MCNC: 0.53 UG/L (ref 0–0.04)
TSH SERPL DL<=0.005 MIU/L-ACNC: 0.46 MU/L (ref 0.4–4)
WBC # BLD AUTO: 16.3 10E9/L (ref 4–11)

## 2017-06-22 PROCEDURE — 27210787 ZZH MANIFOLD CR2

## 2017-06-22 PROCEDURE — 25000125 ZZHC RX 250: Performed by: INTERNAL MEDICINE

## 2017-06-22 PROCEDURE — 36415 COLL VENOUS BLD VENIPUNCTURE: CPT | Performed by: INTERNAL MEDICINE

## 2017-06-22 PROCEDURE — 84145 PROCALCITONIN (PCT): CPT | Performed by: INTERNAL MEDICINE

## 2017-06-22 PROCEDURE — 4A023N7 MEASUREMENT OF CARDIAC SAMPLING AND PRESSURE, LEFT HEART, PERCUTANEOUS APPROACH: ICD-10-PCS | Performed by: INTERNAL MEDICINE

## 2017-06-22 PROCEDURE — 25000132 ZZH RX MED GY IP 250 OP 250 PS 637: Performed by: INTERNAL MEDICINE

## 2017-06-22 PROCEDURE — 93458 L HRT ARTERY/VENTRICLE ANGIO: CPT | Mod: 26 | Performed by: INTERNAL MEDICINE

## 2017-06-22 PROCEDURE — 85347 COAGULATION TIME ACTIVATED: CPT

## 2017-06-22 PROCEDURE — 25000128 H RX IP 250 OP 636: Performed by: INTERNAL MEDICINE

## 2017-06-22 PROCEDURE — 83036 HEMOGLOBIN GLYCOSYLATED A1C: CPT | Performed by: INTERNAL MEDICINE

## 2017-06-22 PROCEDURE — 40000275 ZZH STATISTIC RCP TIME EA 10 MIN

## 2017-06-22 PROCEDURE — 99223 1ST HOSP IP/OBS HIGH 75: CPT | Mod: AI | Performed by: INTERNAL MEDICINE

## 2017-06-22 PROCEDURE — 27211089 ZZH KIT ACIST INJECTOR CR3

## 2017-06-22 PROCEDURE — 27210795 ZZH PAD DEFIB QUICK CR4

## 2017-06-22 PROCEDURE — 84443 ASSAY THYROID STIM HORMONE: CPT | Performed by: INTERNAL MEDICINE

## 2017-06-22 PROCEDURE — 80061 LIPID PANEL: CPT | Performed by: INTERNAL MEDICINE

## 2017-06-22 PROCEDURE — 99207 ZZC APP CREDIT; MD BILLING SHARED VISIT: CPT | Performed by: INTERNAL MEDICINE

## 2017-06-22 PROCEDURE — 99152 MOD SED SAME PHYS/QHP 5/>YRS: CPT

## 2017-06-22 PROCEDURE — 99152 MOD SED SAME PHYS/QHP 5/>YRS: CPT | Performed by: INTERNAL MEDICINE

## 2017-06-22 PROCEDURE — B2111ZZ FLUOROSCOPY OF MULTIPLE CORONARY ARTERIES USING LOW OSMOLAR CONTRAST: ICD-10-PCS | Performed by: INTERNAL MEDICINE

## 2017-06-22 PROCEDURE — 94640 AIRWAY INHALATION TREATMENT: CPT

## 2017-06-22 PROCEDURE — 93458 L HRT ARTERY/VENTRICLE ANGIO: CPT

## 2017-06-22 PROCEDURE — 12000000 ZZH R&B MED SURG/OB

## 2017-06-22 PROCEDURE — 85027 COMPLETE CBC AUTOMATED: CPT | Performed by: INTERNAL MEDICINE

## 2017-06-22 PROCEDURE — 27210946 ZZH KIT HC TOTES DISP CR8

## 2017-06-22 PROCEDURE — C1760 CLOSURE DEV, VASC: HCPCS

## 2017-06-22 PROCEDURE — 83605 ASSAY OF LACTIC ACID: CPT | Performed by: HOSPITALIST

## 2017-06-22 PROCEDURE — 25500064 ZZH RX 255 OP 636: Performed by: INTERNAL MEDICINE

## 2017-06-22 PROCEDURE — 84484 ASSAY OF TROPONIN QUANT: CPT | Performed by: INTERNAL MEDICINE

## 2017-06-22 PROCEDURE — 85520 HEPARIN ASSAY: CPT | Performed by: INTERNAL MEDICINE

## 2017-06-22 PROCEDURE — 99223 1ST HOSP IP/OBS HIGH 75: CPT | Mod: 25 | Performed by: INTERNAL MEDICINE

## 2017-06-22 PROCEDURE — 93005 ELECTROCARDIOGRAM TRACING: CPT

## 2017-06-22 PROCEDURE — 40000264 ECHO COMPLETE WITH LUMASON

## 2017-06-22 PROCEDURE — 93306 TTE W/DOPPLER COMPLETE: CPT | Mod: 26 | Performed by: INTERNAL MEDICINE

## 2017-06-22 PROCEDURE — 93010 ELECTROCARDIOGRAM REPORT: CPT | Mod: 76 | Performed by: INTERNAL MEDICINE

## 2017-06-22 PROCEDURE — 27210910 ZZH NEEDLE CR6

## 2017-06-22 PROCEDURE — 36415 COLL VENOUS BLD VENIPUNCTURE: CPT | Performed by: HOSPITALIST

## 2017-06-22 RX ORDER — ASPIRIN 325 MG
325 TABLET ORAL DAILY
Status: DISCONTINUED | OUTPATIENT
Start: 2017-06-23 | End: 2017-06-22

## 2017-06-22 RX ORDER — DIPHENHYDRAMINE HCL 25 MG
50 CAPSULE ORAL EVERY 6 HOURS PRN
Status: DISCONTINUED | OUTPATIENT
Start: 2017-06-22 | End: 2017-06-23 | Stop reason: HOSPADM

## 2017-06-22 RX ORDER — ACETAMINOPHEN 325 MG/1
325-650 TABLET ORAL EVERY 4 HOURS PRN
Status: DISCONTINUED | OUTPATIENT
Start: 2017-06-22 | End: 2017-06-23 | Stop reason: HOSPADM

## 2017-06-22 RX ORDER — FUROSEMIDE 10 MG/ML
20-100 INJECTION INTRAMUSCULAR; INTRAVENOUS
Status: DISCONTINUED | OUTPATIENT
Start: 2017-06-22 | End: 2017-06-22 | Stop reason: HOSPADM

## 2017-06-22 RX ORDER — EPTIFIBATIDE 2 MG/ML
180 INJECTION, SOLUTION INTRAVENOUS EVERY 10 MIN PRN
Status: DISCONTINUED | OUTPATIENT
Start: 2017-06-22 | End: 2017-06-22 | Stop reason: HOSPADM

## 2017-06-22 RX ORDER — FLUTICASONE PROPIONATE 50 MCG
1 SPRAY, SUSPENSION (ML) NASAL DAILY
Status: DISCONTINUED | OUTPATIENT
Start: 2017-06-22 | End: 2017-06-23 | Stop reason: HOSPADM

## 2017-06-22 RX ORDER — MORPHINE SULFATE 2 MG/ML
1-2 INJECTION, SOLUTION INTRAMUSCULAR; INTRAVENOUS EVERY 5 MIN PRN
Status: DISCONTINUED | OUTPATIENT
Start: 2017-06-22 | End: 2017-06-22 | Stop reason: HOSPADM

## 2017-06-22 RX ORDER — NITROGLYCERIN 20 MG/100ML
.07-2 INJECTION INTRAVENOUS CONTINUOUS
Status: DISCONTINUED | OUTPATIENT
Start: 2017-06-22 | End: 2017-06-23 | Stop reason: HOSPADM

## 2017-06-22 RX ORDER — NITROGLYCERIN 5 MG/ML
100-500 VIAL (ML) INTRAVENOUS
Status: DISCONTINUED | OUTPATIENT
Start: 2017-06-22 | End: 2017-06-22 | Stop reason: HOSPADM

## 2017-06-22 RX ORDER — NICARDIPINE HYDROCHLORIDE 2.5 MG/ML
100 INJECTION INTRAVENOUS
Status: DISCONTINUED | OUTPATIENT
Start: 2017-06-22 | End: 2017-06-22 | Stop reason: HOSPADM

## 2017-06-22 RX ORDER — NALOXONE HYDROCHLORIDE 0.4 MG/ML
.1-.4 INJECTION, SOLUTION INTRAMUSCULAR; INTRAVENOUS; SUBCUTANEOUS
Status: DISCONTINUED | OUTPATIENT
Start: 2017-06-22 | End: 2017-06-22

## 2017-06-22 RX ORDER — ASPIRIN 325 MG
325 TABLET ORAL
Status: DISCONTINUED | OUTPATIENT
Start: 2017-06-22 | End: 2017-06-22 | Stop reason: HOSPADM

## 2017-06-22 RX ORDER — HYDROCODONE BITARTRATE AND ACETAMINOPHEN 5; 325 MG/1; MG/1
1-2 TABLET ORAL EVERY 4 HOURS PRN
Status: DISCONTINUED | OUTPATIENT
Start: 2017-06-22 | End: 2017-06-23 | Stop reason: HOSPADM

## 2017-06-22 RX ORDER — POLYETHYLENE GLYCOL 3350 17 G/17G
17 POWDER, FOR SOLUTION ORAL DAILY PRN
Status: DISCONTINUED | OUTPATIENT
Start: 2017-06-22 | End: 2017-06-23 | Stop reason: HOSPADM

## 2017-06-22 RX ORDER — PROMETHAZINE HYDROCHLORIDE 25 MG/ML
6.25-25 INJECTION, SOLUTION INTRAMUSCULAR; INTRAVENOUS EVERY 4 HOURS PRN
Status: DISCONTINUED | OUTPATIENT
Start: 2017-06-22 | End: 2017-06-22 | Stop reason: HOSPADM

## 2017-06-22 RX ORDER — OXYCODONE HYDROCHLORIDE 5 MG/1
5-10 TABLET ORAL
Status: DISCONTINUED | OUTPATIENT
Start: 2017-06-22 | End: 2017-06-23 | Stop reason: HOSPADM

## 2017-06-22 RX ORDER — CLOPIDOGREL BISULFATE 75 MG/1
75 TABLET ORAL
Status: DISCONTINUED | OUTPATIENT
Start: 2017-06-22 | End: 2017-06-22 | Stop reason: HOSPADM

## 2017-06-22 RX ORDER — LIDOCAINE 40 MG/G
CREAM TOPICAL
Status: DISCONTINUED | OUTPATIENT
Start: 2017-06-22 | End: 2017-06-22

## 2017-06-22 RX ORDER — CARVEDILOL 6.25 MG/1
6.25 TABLET ORAL 2 TIMES DAILY WITH MEALS
Status: DISCONTINUED | OUTPATIENT
Start: 2017-06-22 | End: 2017-06-23

## 2017-06-22 RX ORDER — ONDANSETRON 2 MG/ML
4 INJECTION INTRAMUSCULAR; INTRAVENOUS EVERY 6 HOURS PRN
Status: DISCONTINUED | OUTPATIENT
Start: 2017-06-22 | End: 2017-06-23 | Stop reason: HOSPADM

## 2017-06-22 RX ORDER — IOPAMIDOL 755 MG/ML
50 INJECTION, SOLUTION INTRAVASCULAR ONCE
Status: COMPLETED | OUTPATIENT
Start: 2017-06-22 | End: 2017-06-22

## 2017-06-22 RX ORDER — SODIUM CHLORIDE 9 MG/ML
INJECTION, SOLUTION INTRAVENOUS CONTINUOUS
Status: DISCONTINUED | OUTPATIENT
Start: 2017-06-22 | End: 2017-06-22 | Stop reason: HOSPADM

## 2017-06-22 RX ORDER — POTASSIUM CHLORIDE 7.45 MG/ML
10 INJECTION INTRAVENOUS
Status: DISCONTINUED | OUTPATIENT
Start: 2017-06-22 | End: 2017-06-22 | Stop reason: HOSPADM

## 2017-06-22 RX ORDER — METHYLPREDNISOLONE SODIUM SUCCINATE 125 MG/2ML
125 INJECTION, POWDER, LYOPHILIZED, FOR SOLUTION INTRAMUSCULAR; INTRAVENOUS
Status: DISCONTINUED | OUTPATIENT
Start: 2017-06-22 | End: 2017-06-22 | Stop reason: HOSPADM

## 2017-06-22 RX ORDER — SODIUM CHLORIDE 9 MG/ML
INJECTION, SOLUTION INTRAVENOUS CONTINUOUS
Status: DISCONTINUED | OUTPATIENT
Start: 2017-06-22 | End: 2017-06-23 | Stop reason: HOSPADM

## 2017-06-22 RX ORDER — PROTAMINE SULFATE 10 MG/ML
25-100 INJECTION, SOLUTION INTRAVENOUS EVERY 5 MIN PRN
Status: DISCONTINUED | OUTPATIENT
Start: 2017-06-22 | End: 2017-06-22 | Stop reason: HOSPADM

## 2017-06-22 RX ORDER — ONDANSETRON 4 MG/1
4 TABLET, ORALLY DISINTEGRATING ORAL EVERY 6 HOURS PRN
Status: DISCONTINUED | OUTPATIENT
Start: 2017-06-22 | End: 2017-06-23 | Stop reason: HOSPADM

## 2017-06-22 RX ORDER — EPTIFIBATIDE 2 MG/ML
2 INJECTION, SOLUTION INTRAVENOUS CONTINUOUS PRN
Status: DISCONTINUED | OUTPATIENT
Start: 2017-06-22 | End: 2017-06-22 | Stop reason: HOSPADM

## 2017-06-22 RX ORDER — NIFEDIPINE 10 MG/1
10 CAPSULE ORAL
Status: DISCONTINUED | OUTPATIENT
Start: 2017-06-22 | End: 2017-06-22 | Stop reason: HOSPADM

## 2017-06-22 RX ORDER — ASPIRIN 81 MG/1
81 TABLET ORAL DAILY
Status: DISCONTINUED | OUTPATIENT
Start: 2017-06-23 | End: 2017-06-23 | Stop reason: HOSPADM

## 2017-06-22 RX ORDER — ASPIRIN 81 MG/1
324 TABLET, CHEWABLE ORAL ONCE
Status: DISCONTINUED | OUTPATIENT
Start: 2017-06-22 | End: 2017-06-22

## 2017-06-22 RX ORDER — DEXTROSE MONOHYDRATE 25 G/50ML
12.5-5 INJECTION, SOLUTION INTRAVENOUS EVERY 30 MIN PRN
Status: DISCONTINUED | OUTPATIENT
Start: 2017-06-22 | End: 2017-06-22 | Stop reason: HOSPADM

## 2017-06-22 RX ORDER — ENALAPRILAT 1.25 MG/ML
1.25-2.5 INJECTION INTRAVENOUS
Status: DISCONTINUED | OUTPATIENT
Start: 2017-06-22 | End: 2017-06-22 | Stop reason: HOSPADM

## 2017-06-22 RX ORDER — FLUMAZENIL 0.1 MG/ML
0.2 INJECTION, SOLUTION INTRAVENOUS
Status: ACTIVE | OUTPATIENT
Start: 2017-06-22 | End: 2017-06-23

## 2017-06-22 RX ORDER — NALOXONE HYDROCHLORIDE 0.4 MG/ML
.1-.4 INJECTION, SOLUTION INTRAMUSCULAR; INTRAVENOUS; SUBCUTANEOUS
Status: DISCONTINUED | OUTPATIENT
Start: 2017-06-22 | End: 2017-06-23 | Stop reason: HOSPADM

## 2017-06-22 RX ORDER — VERAPAMIL HYDROCHLORIDE 2.5 MG/ML
1-2.5 INJECTION, SOLUTION INTRAVENOUS
Status: DISCONTINUED | OUTPATIENT
Start: 2017-06-22 | End: 2017-06-22 | Stop reason: HOSPADM

## 2017-06-22 RX ORDER — ATROPINE SULFATE 0.1 MG/ML
0.5 INJECTION INTRAVENOUS EVERY 5 MIN PRN
Status: ACTIVE | OUTPATIENT
Start: 2017-06-22 | End: 2017-06-23

## 2017-06-22 RX ORDER — NITROGLYCERIN 5 MG/ML
100-200 VIAL (ML) INTRAVENOUS
Status: DISCONTINUED | OUTPATIENT
Start: 2017-06-22 | End: 2017-06-22 | Stop reason: HOSPADM

## 2017-06-22 RX ORDER — ACETAMINOPHEN 650 MG/1
650 SUPPOSITORY RECTAL EVERY 4 HOURS PRN
Status: DISCONTINUED | OUTPATIENT
Start: 2017-06-22 | End: 2017-06-23 | Stop reason: HOSPADM

## 2017-06-22 RX ORDER — FLUMAZENIL 0.1 MG/ML
0.2 INJECTION, SOLUTION INTRAVENOUS
Status: DISCONTINUED | OUTPATIENT
Start: 2017-06-22 | End: 2017-06-22 | Stop reason: HOSPADM

## 2017-06-22 RX ORDER — FENTANYL CITRATE 50 UG/ML
25-50 INJECTION, SOLUTION INTRAMUSCULAR; INTRAVENOUS
Status: DISCONTINUED | OUTPATIENT
Start: 2017-06-22 | End: 2017-06-22 | Stop reason: HOSPADM

## 2017-06-22 RX ORDER — LIDOCAINE 40 MG/G
CREAM TOPICAL
Status: DISCONTINUED | OUTPATIENT
Start: 2017-06-22 | End: 2017-06-23 | Stop reason: HOSPADM

## 2017-06-22 RX ORDER — ASPIRIN 81 MG/1
81-324 TABLET, CHEWABLE ORAL
Status: DISCONTINUED | OUTPATIENT
Start: 2017-06-22 | End: 2017-06-22 | Stop reason: HOSPADM

## 2017-06-22 RX ORDER — NALOXONE HYDROCHLORIDE 0.4 MG/ML
.2-.4 INJECTION, SOLUTION INTRAMUSCULAR; INTRAVENOUS; SUBCUTANEOUS
Status: DISCONTINUED | OUTPATIENT
Start: 2017-06-22 | End: 2017-06-22

## 2017-06-22 RX ORDER — LISINOPRIL 5 MG/1
5 TABLET ORAL DAILY
Status: DISCONTINUED | OUTPATIENT
Start: 2017-06-22 | End: 2017-06-23 | Stop reason: HOSPADM

## 2017-06-22 RX ORDER — ATROPINE SULFATE 0.1 MG/ML
.5-1 INJECTION INTRAVENOUS
Status: DISCONTINUED | OUTPATIENT
Start: 2017-06-22 | End: 2017-06-22 | Stop reason: HOSPADM

## 2017-06-22 RX ORDER — ALBUTEROL SULFATE 90 UG/1
1-2 AEROSOL, METERED RESPIRATORY (INHALATION) EVERY 4 HOURS PRN
Status: DISCONTINUED | OUTPATIENT
Start: 2017-06-22 | End: 2017-06-23 | Stop reason: HOSPADM

## 2017-06-22 RX ORDER — FLUTICASONE PROPIONATE 50 MCG
1 SPRAY, SUSPENSION (ML) NASAL DAILY
Status: ON HOLD | COMMUNITY
End: 2019-02-16

## 2017-06-22 RX ORDER — METOPROLOL TARTRATE 25 MG/1
25 TABLET, FILM COATED ORAL 2 TIMES DAILY
Status: DISCONTINUED | OUTPATIENT
Start: 2017-06-22 | End: 2017-06-22

## 2017-06-22 RX ORDER — HYDRALAZINE HYDROCHLORIDE 20 MG/ML
10-20 INJECTION INTRAMUSCULAR; INTRAVENOUS
Status: DISCONTINUED | OUTPATIENT
Start: 2017-06-22 | End: 2017-06-22 | Stop reason: HOSPADM

## 2017-06-22 RX ORDER — ALUMINA, MAGNESIA, AND SIMETHICONE 2400; 2400; 240 MG/30ML; MG/30ML; MG/30ML
15-30 SUSPENSION ORAL EVERY 4 HOURS PRN
Status: DISCONTINUED | OUTPATIENT
Start: 2017-06-22 | End: 2017-06-23 | Stop reason: HOSPADM

## 2017-06-22 RX ORDER — POTASSIUM CHLORIDE 1500 MG/1
20 TABLET, EXTENDED RELEASE ORAL
Status: DISCONTINUED | OUTPATIENT
Start: 2017-06-22 | End: 2017-06-22 | Stop reason: HOSPADM

## 2017-06-22 RX ORDER — NITROGLYCERIN 0.4 MG/1
0.4 TABLET SUBLINGUAL EVERY 5 MIN PRN
Status: DISCONTINUED | OUTPATIENT
Start: 2017-06-22 | End: 2017-06-22 | Stop reason: HOSPADM

## 2017-06-22 RX ORDER — NALOXONE HYDROCHLORIDE 0.4 MG/ML
0.4 INJECTION, SOLUTION INTRAMUSCULAR; INTRAVENOUS; SUBCUTANEOUS EVERY 5 MIN PRN
Status: DISCONTINUED | OUTPATIENT
Start: 2017-06-22 | End: 2017-06-22 | Stop reason: HOSPADM

## 2017-06-22 RX ORDER — LORAZEPAM 2 MG/ML
.5-2 INJECTION INTRAMUSCULAR EVERY 4 HOURS PRN
Status: DISCONTINUED | OUTPATIENT
Start: 2017-06-22 | End: 2017-06-22 | Stop reason: HOSPADM

## 2017-06-22 RX ORDER — PHENYLEPHRINE HCL IN 0.9% NACL 1 MG/10 ML
20-100 SYRINGE (ML) INTRAVENOUS
Status: DISCONTINUED | OUTPATIENT
Start: 2017-06-22 | End: 2017-06-22 | Stop reason: HOSPADM

## 2017-06-22 RX ORDER — LORAZEPAM 2 MG/ML
0.5 INJECTION INTRAMUSCULAR
Status: DISCONTINUED | OUTPATIENT
Start: 2017-06-22 | End: 2017-06-22 | Stop reason: HOSPADM

## 2017-06-22 RX ORDER — LIDOCAINE HYDROCHLORIDE 10 MG/ML
1-10 INJECTION, SOLUTION EPIDURAL; INFILTRATION; INTRACAUDAL; PERINEURAL
Status: COMPLETED | OUTPATIENT
Start: 2017-06-22 | End: 2017-06-22

## 2017-06-22 RX ORDER — ONDANSETRON 2 MG/ML
4 INJECTION INTRAMUSCULAR; INTRAVENOUS EVERY 4 HOURS PRN
Status: DISCONTINUED | OUTPATIENT
Start: 2017-06-22 | End: 2017-06-22 | Stop reason: HOSPADM

## 2017-06-22 RX ORDER — DIPHENHYDRAMINE HYDROCHLORIDE 50 MG/ML
25-50 INJECTION INTRAMUSCULAR; INTRAVENOUS
Status: DISCONTINUED | OUTPATIENT
Start: 2017-06-22 | End: 2017-06-22 | Stop reason: HOSPADM

## 2017-06-22 RX ORDER — POTASSIUM CHLORIDE 29.8 MG/ML
20 INJECTION INTRAVENOUS
Status: DISCONTINUED | OUTPATIENT
Start: 2017-06-22 | End: 2017-06-22 | Stop reason: HOSPADM

## 2017-06-22 RX ORDER — FENTANYL CITRATE 50 UG/ML
25-50 INJECTION, SOLUTION INTRAMUSCULAR; INTRAVENOUS
Status: ACTIVE | OUTPATIENT
Start: 2017-06-22 | End: 2017-06-23

## 2017-06-22 RX ORDER — DOBUTAMINE HYDROCHLORIDE 200 MG/100ML
2-20 INJECTION INTRAVENOUS CONTINUOUS PRN
Status: DISCONTINUED | OUTPATIENT
Start: 2017-06-22 | End: 2017-06-22 | Stop reason: HOSPADM

## 2017-06-22 RX ORDER — SODIUM NITROPRUSSIDE 25 MG/ML
100-200 INJECTION INTRAVENOUS
Status: DISCONTINUED | OUTPATIENT
Start: 2017-06-22 | End: 2017-06-22 | Stop reason: HOSPADM

## 2017-06-22 RX ORDER — ACETAMINOPHEN 325 MG/1
650 TABLET ORAL EVERY 4 HOURS PRN
Status: DISCONTINUED | OUTPATIENT
Start: 2017-06-22 | End: 2017-06-22

## 2017-06-22 RX ORDER — ADENOSINE 3 MG/ML
12-12000 INJECTION, SOLUTION INTRAVENOUS
Status: DISCONTINUED | OUTPATIENT
Start: 2017-06-22 | End: 2017-06-22 | Stop reason: HOSPADM

## 2017-06-22 RX ORDER — NITROGLYCERIN 20 MG/100ML
.07-2 INJECTION INTRAVENOUS CONTINUOUS PRN
Status: DISCONTINUED | OUTPATIENT
Start: 2017-06-22 | End: 2017-06-22 | Stop reason: HOSPADM

## 2017-06-22 RX ORDER — PROTAMINE SULFATE 10 MG/ML
1-5 INJECTION, SOLUTION INTRAVENOUS
Status: DISCONTINUED | OUTPATIENT
Start: 2017-06-22 | End: 2017-06-22 | Stop reason: HOSPADM

## 2017-06-22 RX ORDER — DOPAMINE HYDROCHLORIDE 160 MG/100ML
2-20 INJECTION, SOLUTION INTRAVENOUS CONTINUOUS PRN
Status: DISCONTINUED | OUTPATIENT
Start: 2017-06-22 | End: 2017-06-22 | Stop reason: HOSPADM

## 2017-06-22 RX ORDER — LIDOCAINE HYDROCHLORIDE 10 MG/ML
30 INJECTION, SOLUTION EPIDURAL; INFILTRATION; INTRACAUDAL; PERINEURAL
Status: DISCONTINUED | OUTPATIENT
Start: 2017-06-22 | End: 2017-06-22 | Stop reason: HOSPADM

## 2017-06-22 RX ORDER — IPRATROPIUM BROMIDE AND ALBUTEROL SULFATE 2.5; .5 MG/3ML; MG/3ML
3 SOLUTION RESPIRATORY (INHALATION) EVERY 4 HOURS PRN
Status: DISCONTINUED | OUTPATIENT
Start: 2017-06-22 | End: 2017-06-23 | Stop reason: HOSPADM

## 2017-06-22 RX ORDER — PRASUGREL 10 MG/1
10-60 TABLET, FILM COATED ORAL
Status: DISCONTINUED | OUTPATIENT
Start: 2017-06-22 | End: 2017-06-22 | Stop reason: HOSPADM

## 2017-06-22 RX ORDER — CLOPIDOGREL BISULFATE 75 MG/1
300-600 TABLET ORAL
Status: DISCONTINUED | OUTPATIENT
Start: 2017-06-22 | End: 2017-06-22 | Stop reason: HOSPADM

## 2017-06-22 RX ORDER — HEPARIN SODIUM 1000 [USP'U]/ML
1000-10000 INJECTION, SOLUTION INTRAVENOUS; SUBCUTANEOUS EVERY 5 MIN PRN
Status: DISCONTINUED | OUTPATIENT
Start: 2017-06-22 | End: 2017-06-22 | Stop reason: HOSPADM

## 2017-06-22 RX ORDER — METOPROLOL TARTRATE 1 MG/ML
5 INJECTION, SOLUTION INTRAVENOUS EVERY 5 MIN PRN
Status: DISCONTINUED | OUTPATIENT
Start: 2017-06-22 | End: 2017-06-22 | Stop reason: HOSPADM

## 2017-06-22 RX ORDER — LORAZEPAM 0.5 MG/1
0.5 TABLET ORAL
Status: DISCONTINUED | OUTPATIENT
Start: 2017-06-22 | End: 2017-06-22 | Stop reason: HOSPADM

## 2017-06-22 RX ORDER — BUPIVACAINE HYDROCHLORIDE 2.5 MG/ML
1-10 INJECTION, SOLUTION EPIDURAL; INFILTRATION; INTRACAUDAL
Status: DISCONTINUED | OUTPATIENT
Start: 2017-06-22 | End: 2017-06-22 | Stop reason: HOSPADM

## 2017-06-22 RX ORDER — DIPHENHYDRAMINE HYDROCHLORIDE 50 MG/ML
25 INJECTION INTRAMUSCULAR; INTRAVENOUS ONCE
Status: DISCONTINUED | OUTPATIENT
Start: 2017-06-22 | End: 2017-06-23 | Stop reason: HOSPADM

## 2017-06-22 RX ORDER — ATORVASTATIN CALCIUM 40 MG/1
40 TABLET, FILM COATED ORAL DAILY
Status: DISCONTINUED | OUTPATIENT
Start: 2017-06-22 | End: 2017-06-23 | Stop reason: HOSPADM

## 2017-06-22 RX ADMIN — SALINE NASAL SPRAY 1 SPRAY: 1.5 SOLUTION NASAL at 00:27

## 2017-06-22 RX ADMIN — SALINE NASAL SPRAY 1 SPRAY: 1.5 SOLUTION NASAL at 01:52

## 2017-06-22 RX ADMIN — ATORVASTATIN CALCIUM 40 MG: 40 TABLET, FILM COATED ORAL at 04:20

## 2017-06-22 RX ADMIN — LISINOPRIL 5 MG: 5 TABLET ORAL at 04:20

## 2017-06-22 RX ADMIN — CARVEDILOL 6.25 MG: 6.25 TABLET, FILM COATED ORAL at 09:38

## 2017-06-22 RX ADMIN — FLUTICASONE PROPIONATE 1 SPRAY: 50 SPRAY, METERED NASAL at 08:03

## 2017-06-22 RX ADMIN — LIDOCAINE HYDROCHLORIDE 80 MG: 10 INJECTION, SOLUTION EPIDURAL; INFILTRATION; INTRACAUDAL; PERINEURAL at 13:40

## 2017-06-22 RX ADMIN — ALUMINUM HYDROXIDE, MAGNESIUM HYDROXIDE, AND DIMETHICONE 30 ML: 400; 400; 40 SUSPENSION ORAL at 08:15

## 2017-06-22 RX ADMIN — ALUMINUM HYDROXIDE, MAGNESIUM HYDROXIDE, AND DIMETHICONE 30 ML: 400; 400; 40 SUSPENSION ORAL at 22:24

## 2017-06-22 RX ADMIN — SULFUR HEXAFLUORIDE 5 ML: KIT at 11:01

## 2017-06-22 RX ADMIN — MIDAZOLAM HYDROCHLORIDE 1 MG: 1 INJECTION, SOLUTION INTRAMUSCULAR; INTRAVENOUS at 13:40

## 2017-06-22 RX ADMIN — METOPROLOL TARTRATE 25 MG: 25 TABLET ORAL at 04:20

## 2017-06-22 RX ADMIN — IOPAMIDOL 50 ML: 755 INJECTION, SOLUTION INTRAVASCULAR at 14:00

## 2017-06-22 RX ADMIN — DIPHENHYDRAMINE HYDROCHLORIDE 50 MG: 25 CAPSULE ORAL at 12:40

## 2017-06-22 RX ADMIN — ALUMINUM HYDROXIDE, MAGNESIUM HYDROXIDE, AND DIMETHICONE 30 ML: 400; 400; 40 SUSPENSION ORAL at 12:45

## 2017-06-22 RX ADMIN — PREDNISONE 30 MG: 10 TABLET ORAL at 12:40

## 2017-06-22 RX ADMIN — ALUMINUM HYDROXIDE, MAGNESIUM HYDROXIDE, AND DIMETHICONE 30 ML: 400; 400; 40 SUSPENSION ORAL at 16:49

## 2017-06-22 RX ADMIN — SODIUM CHLORIDE: 9 INJECTION, SOLUTION INTRAVENOUS at 11:03

## 2017-06-22 RX ADMIN — FENTANYL CITRATE 50 MCG: 50 INJECTION, SOLUTION INTRAMUSCULAR; INTRAVENOUS at 13:40

## 2017-06-22 RX ADMIN — ASPIRIN 325 MG ORAL TABLET 325 MG: 325 PILL ORAL at 11:04

## 2017-06-22 RX ADMIN — IPRATROPIUM BROMIDE AND ALBUTEROL SULFATE 3 ML: .5; 3 SOLUTION RESPIRATORY (INHALATION) at 20:22

## 2017-06-22 RX ADMIN — CARVEDILOL 6.25 MG: 6.25 TABLET, FILM COATED ORAL at 18:02

## 2017-06-22 ASSESSMENT — ENCOUNTER SYMPTOMS
EYES NEGATIVE: 1
ABDOMINAL PAIN: 0
FEVER: 0
POLYDIPSIA: 0
NAUSEA: 0
PALPITATIONS: 0
VOMITING: 0
SHORTNESS OF BREATH: 1
ACTIVITY CHANGE: 1
DIARRHEA: 0
POLYPHAGIA: 0
MUSCULOSKELETAL NEGATIVE: 1
TROUBLE SWALLOWING: 0
NEUROLOGICAL NEGATIVE: 1
CHILLS: 0
SEIZURES: 0
FATIGUE: 1
NERVOUS/ANXIOUS: 1
WHEEZING: 1
CHEST TIGHTNESS: 1
SORE THROAT: 0

## 2017-06-22 ASSESSMENT — PAIN DESCRIPTION - DESCRIPTORS
DESCRIPTORS: BURNING
DESCRIPTORS: BURNING

## 2017-06-22 NOTE — ED NOTES
Pt watching TV, takes his oxygen off at times, spo2 decreases to 92-93%. Pt states pain is 1/10 and his breathing is improved.

## 2017-06-22 NOTE — PROGRESS NOTES
[facility]  Hospitalist Progress Note  Rome Gomez MD    Assessment & Plan   Mr. Eliseo Davis is a pleasant 56-year-old male with no known previous cardiac history who presents with persistent shortness of breath and acute onset of chest pain concerning for acute coronary syndrome.  He was found to have an elevated troponin and subsequently transferred to [facility] for further evaluation.     Non-ST elevation myocardial infarction:    Risk factors include obesity, 40-pack-year smoking history.    The patient is hemodynamically stable.  EKG shows NSR with nonspecific T wave abnormality in inf leads.  D dimer 0.3  BNP 46  TnI peak level of 0.508.  FLP: , HDL 75,     Recent Labs  Lab 06/22/17  0731 06/22/17  0325 06/21/17  2135 06/21/17  1855   TROPI 0.508* 0.533* 0.393* <0.015The 99th percentile for upper reference range is 0.045 ug/L.  Troponin values in the range of 0.045 - 0.120 ug/L may be associated with risks of adverse clinical events.     -Cardiac monitoring.   -Cont aspirin, metoprolol, Lipitor and lisinopril with hold parameters.  -Continue with heparin drip and nitro drip that was started prior to transfer.   -Echocardiogram   -Cardiology consult  -Will keep n.p.o. except for medications.      COPD:    40-pack-year smoking history.  He quit smoking in 01/2017.  The patient was recently hospitalized at [facility] from 06/11 to 06/13 for COPD exacerbation and treated with steroids.  He does have a slight elevation in his eosinophils on differential along with a white count of 12.6.  His initial lactic acid was 3.4, however, his chest x-ray did not show any acute infiltrates and he is denying fevers, chills or other infectious symptoms.    -Continue p.r.n. neb  -No wheezing on examination.   -Procalcitonin 0.05.   -Blood cultures are pending.   -No abx ordered at this time, monitor fever curve  -PFT as outpatient to determine the severity of his COPD    RUL  pulmonary nodule:  Noted incidentally on CT chest on 06/11, measuring 5 mm.  -Follow up CT in 12 months    CKD stage 2.  Baseline cr 1-1.2.    Recent Labs  Lab 06/21/17  1855   CR 1.18     Stable.     DVT prophylaxis:  The patient is on heparin drip.      CODE STATUS: FULL CODE.    Disposition: Pending ongoing management and hospital course.    D/W RN.    No charge billed for today's visit.    Interval History   Patient is currently stable. No new event since admission.    -Data reviewed today: I reviewed all new labs and imaging over the last 24 hours. I personally reviewed the EKG tracing showing NSR.    Physical Exam   Temp: 98.2  F (36.8  C) Temp src: Oral BP: 126/79   Heart Rate: 94 Resp: 20 SpO2: 92 % O2 Device: None (Room air) Oxygen Delivery: 3 LPM  Vitals:    06/22/17 0306   Weight: 104.1 kg (229 lb 8 oz)     Vital Signs with Ranges  Temp:  [96.5  F (35.8  C)-98.2  F (36.8  C)] 98.2  F (36.8  C)  Pulse:  [112] 112  Heart Rate:  [] 94  Resp:  [10-30] 20  BP: (111-171)/() 126/79  FiO2 (%):  [98 %] 98 %  SpO2:  [91 %-100 %] 92 %  I/O's Last 24 hours  I/O last 3 completed shifts:  In: -   Out: 1400 [Urine:1400]    Constitutional: Comfortable, no acute distress  HEENT: No conjunctival pallor.  Neurologic: Awake, alert  Neck: Supple, no elevated JVP  Respiratory: Decreased B/L lung sounds  Cardiovascular: Normal S1 and S2. Regular rhythm and rate, no murmur  GI: Abdomen soft, not tender, not distended  Extremities: No calf tenderness, no edema  Skin/integument: No acute rash, no cyanosis    Medications   All medications were reviewed.    - MEDICATION INSTRUCTIONS -       - MEDICATION INSTRUCTIONS -       nitroglycerin 0.27 mcg/kg/min (06/22/17 0810)     - MEDICATION INSTRUCTIONS -       HEParin 850 Units/hr (06/22/17 0923)       fluticasone  1 spray Both Nostrils Daily     sodium chloride (PF)  3 mL Intracatheter Q8H     [START ON 6/23/2017] aspirin  325 mg Oral Daily     lisinopril  5 mg Oral Daily      atorvastatin  40 mg Oral Daily     carvedilol  6.25 mg Oral BID w/meals        Data     Recent Labs  Lab 06/22/17  0731 06/22/17  0325 06/21/17  2135 06/21/17  1855   WBC 16.3*  --   --  12.6*   HGB 14.4  --   --  14.9   MCV 94  --   --  96     --   --  399   NA  --   --   --  140   POTASSIUM  --   --   --  4.2   CHLORIDE  --   --   --  104   CO2  --   --   --  27   BUN  --   --   --  12   CR  --   --   --  1.18   ANIONGAP  --   --   --  9   LAUREN  --   --   --  8.2*   GLC  --   --   --  94   ALBUMIN  --   --   --  3.6   PROTTOTAL  --   --   --  7.3   BILITOTAL  --   --   --  0.5   ALKPHOS  --   --   --  94   ALT  --   --   --  40   AST  --   --   --  24   TROPI 0.508* 0.533* 0.393* <0.015The 99th percentile for upper reference range is 0.045 ug/L.  Troponin values in the range of 0.045 - 0.120 ug/L may be associated with risks of adverse clinical events.       Recent Results (from the past 24 hour(s))   XR Chest 2 Views    Narrative    CHEST TWO VIEWS  6/21/2017 8:34 PM      HISTORY: Shortness of breath     COMPARISON: 6/11/2017.    FINDINGS: The heart size is normal. The thoracic aorta is calcified.  The lungs are clear. Calcified lymph nodes in the right hilum and  mediastinum. No pneumothorax or pleural effusion. Old posterior eighth  rib fracture.      Impression    IMPRESSION: No acute abnormality.    WILLA JEAN BAPTISTE MD

## 2017-06-22 NOTE — ED NOTES
Pt removed from the bipap, denies feeling SOB but states he continues to have the pain across his chest.

## 2017-06-22 NOTE — CONSULTS
"CARDIOLOGY CONSULTATION      REQUESTING PHYSICIAN:  None given.      PATIENT HISTORY:  Mr. Eliseo Davis is 56 years old.  He has been transferred to [facility] from [facility] Emergency Department for increasing dyspnea and a trivially abnormal troponin level.  Cardiology consultation has been requested by the Hospitalist Service.      Mr. Davis was interviewed in his room and his sister was present.  He notes that he has had asthma for many years.  He has asthma exacerbations and he has a job spreading fertilizer and notes that the activities of his job can exacerbate his asthma.  He has been in the Emergency Department at least twice this month with increased dyspnea.      The episode which triggered his visit to the Emergency Department yesterday occurred when he was in the bathroom and on the toilet.  He found himself suddenly very short of breath.  These last exacerbations of his dyspnea have been accompanied by a \"band like\" discomfort across his chest.  He describes it as a \"steel band.\"  There is no radiation of the discomfort, but he notes that his dyspnea has worsened.  He has not had that discomfort without some provocation.  He does not have a prior history of heart disease, but he does have an extensive tobacco use history.  He is no longer smoking cigarettes.  He does not have a family history of heart disease, diabetes mellitus or known dyslipidemia.  His HDL is actually 75 mg/dL, while his LDL is borderline high at 107 mg/dL.  His blood pressures have been borderline elevated.      He also notes that he has episodes when his vision darkens.  This has occurred when he has been on the toilet or when he has become more short of breath.  He acknowledges that similar episodes occurred as a child and once when he injured his knee.  He denies any palpitations.      He denies any abnormal bleeding tendencies.  He does have hemorrhoids and he notes that he sometimes has significant " blood spotting but he has not actually had obvious blood in his stools.  He denies any blood in his urine.      ALLERGIES:  Penicillin, dust mites and pollen.      PAST MEDICAL HISTORY:   1.  Reactive air airway disease, sometimes associated with hypoxia.   2.  Long tobacco use history, not smoking now.   3.  Small right upper lobe pulmonary nodule (less than 6 mm).   4.  History of urticaria.      MEDICATIONS:   1.  Aspirin 325 mg p.o. this a.m.   2.  Atorvastatin 40 mg p.o. today   3.  Carvedilol 6.25 mg p.o. b.i.d. (begun now).   4.  Flonase inhaler 1 spray to both nostrils daily.   5.  Lisinopril 5 mg p.o. daily (begun today).        Prior to admission, Mr. Davis had an albuterol inhaler prescribed as well as DuoNebs.  He has previously taken Benadryl and Tylenol.      SOCIAL HISTORY:  As described in the HPI.  He lives independently with his brother.      FAMILY HISTORY:  Negative for coronary artery disease.  A brother has diabetes mellitus.      REVIEW OF SYSTEMS:   CONSTITUTIONAL:  Negative for fever or chills.   RESPIRATORY:  As noted above.   CARDIAC:  As noted above.   GASTROINTESTINAL:  As noted in the HPI.  It is notable for hemorrhoids.   GENITOURINARY:  Negative for blood in the urine.   HEMATOLOGIC:  Negative for abnormal bleeding except as described with hemorrhoids.   ENDOCRINE:  Negative for diabetes mellitus.   MUSCULOSKELETAL:  Notable for a prior right knee injury.     IMMUNE:  Notable for a history of apparently idiopathic urticaria.  The remainder of the comprehensive 10-point review of systems is negative.      PHYSICAL EXAMINATION:   GENERAL:  This is a healthy man in no apparent distress.   VITAL SIGNS:  Blood pressure was 110/76 mmHg, heart rate 71 beats per minute and regular, respiratory rate 14-18 per minute.   SKIN:  Warm and dry without cyanosis or jaundice.   HEAD AND NECK:  Normocephalic.  The eyes were nonicteric.  The neck was free of thyromegaly and the carotid upstrokes were  normal without bruits.   CHEST:  Revealed a mild and diffuse decrease in breath sounds but without crackles or wheezes or prolongation of the expiratory phase.  Breathing was unlabored.   CARDIAC:  On cardiac auscultation, there was an S1 and S2 without extra sounds or murmur.  The rhythm was regular.   ABDOMEN:  There were no bruits or masses or tenderness.   EXTREMITIES:  There is no peripheral pedal edema.   NEUROLOGIC:  The facies were symmetric.  He moved all extremities without focal limitation.      LABORATORY DATA:  EKG demonstrated normal sinus rhythm without ischemic ST segment changes or abnormal Q-waves.  The troponin was elevated at 0.51 this morning.  The troponin yesterday was 0.533.  The lipids were as noted.  The white blood cell count was 6.3, hemoglobin 14.4 and platelet count 333,000.  The sodium was 140, potassium 4.2, BUN 12 and creatinine 1.18.      ASSESSMENT:  Mr. Davis has been transferred for episodic exacerbations of his already significant asthma.  The exacerbations are symptomatically increased dyspnea.  He has difficulty catching his breath.  The symptom is accompanied by a band-like discomfort across his chest.  His troponins are mildly abnormal.  His echocardiogram has been read by myself and demonstrates normal left ventricular systolic performance and even his right ventricle is relatively normal in size.  He has not had evidence of a pulmonary embolism.  I have recommended coronary angiography and intervention as needed.  I discussed the risks, benefits and alternatives to coronary angiography.  I indicated that the risks include stroke, myocardial infarct and death as well as vascular injury, abnormal bleeding, contrast allergy or other potential complications not described.  He has agreed to proceed.  He does have risk factors for coronary disease.      He has a history of apparent idiopathic urticaria.  There is no indication that he is at higher risk for contrast allergy, but  I have given a low dose of prednisone at 30 mg as well as recommended intravenous Benadryl on-call to the heart catheterization laboratory.      Given that he is here and has quite significant asthma and possibly an element of chronic obstructive pulmonary disease given his tobacco use history, he is likely to benefit from pulmonary consultation.         URIAH LARSON MD, [facility]             D: 2017 12:32   T: 2017 13:30   MT: JOE      Name:     MINO LEY   MRN:      [id number]        Account:       [id number]   :      1960           Consult Date:  2017      Document: [id number]

## 2017-06-22 NOTE — ED NOTES
Spo2 decreased to 94% on oxymask at 7L. Pt reports chest pain increased and he was having increased SOB. Dr. Koehler notified.

## 2017-06-22 NOTE — PROVIDER NOTIFICATION
MD Notification    Notified Person:  MD    Notified Persons Name: Dayron    Notification Date/Time: 0410 6/22/17    Notification Interaction:  Talked with Physician    Purpose of Notification: critical lab value top 0.533    Orders Received: Continue to monitor - pt already on nitro, and heparin    Comments:

## 2017-06-22 NOTE — IP AVS SNAPSHOT
MRN:[id number]                      After Visit Summary   6/22/2017    Eliseo Davis    MRN: [id number]           Thank you!     Thank you for choosing [facility] for your care. Our goal is always to provide you with excellent care. Hearing back from our patients is one way we can continue to improve our services. Please take a few minutes to complete the written survey that you may receive in the mail after you visit with us. Thank you!        Patient Information     Date Of Birth          1960        Designated Caregiver       Most Recent Value    Caregiver    Will someone help with your care after discharge? yes    Name of designated caregiver Kiko Davis    Phone number of caregiver [phone]    Caregiver address [address] [place] [address]      About your hospital stay     You were admitted on:  June 22, 2017 You last received care in the:  [facility] [facility]    You were discharged on:  June 23, 2017       Who to Call     For medical emergencies, please call 911.  For non-urgent questions about your medical care, please call your primary care provider or clinic, None          Attending Provider     Provider Specialty    Emmanuel Carrington MD Internal Medicine    Ernie Padgett MD Internal Medicine       Primary Care Provider    None Specified      After Care Instructions     Activity       Your activity upon discharge: activity as tolerated            Diet       Follow this diet upon discharge: Orders Placed This Encounter      Low Saturated Fat Diet                  Follow-up Appointments     Follow-up and recommended labs and tests        -Follow up with PCP in 1-2 weeks. Recommended lab: BMP  -Follow up with pulmonology clinic in 2-4 weeks  -CT chest in 12 months for follow up on lung nodule                  Additional Services     Follow-Up with Cardiac Advanced Practice Provider           Pulmonary Medicine Referral                 Pending Results   "   Date and Time Order Name Status Description    2017 0804 EKG 12-lead, tracing only Preliminary     2017 0333 EKG 12-lead, tracing only Preliminary     2017 1905 Blood culture Preliminary     2017 1905 Blood culture Preliminary             Statement of Approval     Ordered          17 1209  I have reviewed and agree with all the recommendations and orders detailed in this document.  EFFECTIVE NOW     Approved and electronically signed by:  Rome Gomez MD             Admission Information     Date & Time Provider Department Dept. Phone    2017 Ernie Padgett MD [facility] [facility] [phone]      Your Vitals Were     Blood Pressure Temperature Respirations Height Weight Pulse Oximetry    114/67 (BP Location: Right arm) 97.6  F (36.4  C) (Oral) 20 1.753 m (5' 9\") 104.1 kg (229 lb 8 oz) 93%    BMI (Body Mass Index)                   33.89 kg/m2           [employer] Information     [employer] lets you send messages to your doctor, view your test results, renew your prescriptions, schedule appointments and more. To sign up, go to [email or web address] . Click on \"Log in\" on the left side of the screen, which will take you to the Welcome page. Then click on \"Sign up Now\" on the right side of the page.     You will be asked to enter the access code listed below, as well as some personal information. Please follow the directions to create your username and password.     Your access code is: [email or web address]  Expires: 2017  1:16 AM     Your access code will  in 90 days. If you need help or a new code, please call your [facility] clinic or [phone].        Care EveryWhere ID     This is your Care EveryWhere ID. This could be used by other organizations to access your [facility] medical records  [id number]        Equal Access to Services     RODRI FRYE AH: Mariel Jaramillo, jessica bonilla, ban edwards " radha tantomas la'aan ah. So [facility] [phone].    ATENCIÓN: Si celeste rajan, tiene a guzmán disposición servicios gratuitos de asistencia lingüística. Garett al [phone].    We comply with applicable federal civil rights laws and [place] laws. We do not discriminate on the basis of race, color, national origin, age, disability sex, sexual orientation or gender identity.               Review of your medicines      START taking        Dose / Directions    aspirin 81 MG EC tablet   Used for:  Coronary artery disease involving native coronary artery of native heart without angina pectoris        Dose:  81 mg   Take 1 tablet (81 mg) by mouth daily   Quantity:  30 tablet   Refills:  3       atorvastatin 40 MG tablet   Commonly known as:  LIPITOR   Used for:  Coronary artery disease involving native coronary artery of native heart without angina pectoris        Dose:  40 mg   Take 1 tablet (40 mg) by mouth daily   Quantity:  30 tablet   Refills:  1       fluticasone-salmeterol 250-50 MCG/DOSE diskus inhaler   Commonly known as:  ADVAIR   Used for:  Other emphysema (H)        Dose:  1 puff   Inhale 1 puff into the lungs 2 times daily   Quantity:  1 Inhaler   Refills:  1       lisinopril 5 MG tablet   Commonly known as:  PRINIVIL/ZESTRIL   Used for:  Coronary artery disease involving native coronary artery of native heart without angina pectoris        Dose:  5 mg   Take 1 tablet (5 mg) by mouth daily   Quantity:  30 tablet   Refills:  1       tiotropium 18 MCG capsule   Commonly known as:  SPIRIVA HANDIHALER   Used for:  Other emphysema (H)        Inhale contents of one capsule daily.   Quantity:  30 capsule   Refills:  1         CONTINUE these medicines which may have CHANGED, or have new prescriptions. If we are uncertain of the size of tablets/capsules you have at home, strength may be listed as something that might have changed.        Dose / Directions    albuterol 108 (90 BASE) MCG/ACT Inhaler   Commonly known as:   PROAIR HFA/PROVENTIL HFA/VENTOLIN HFA   This may have changed:    - how much to take  - when to take this  - reasons to take this  - additional instructions   Used for:  Reactive airway disease with wheezing with acute exacerbation        Dose:  2 puff   Inhale 2 puffs into the lungs 4 times daily For 5 days, then use 2 puffs every 4 hours as needed   Quantity:  1 Inhaler   Refills:  0         CONTINUE these medicines which have NOT CHANGED        Dose / Directions    acetaminophen 500 MG tablet   Commonly known as:  TYLENOL   Used for:  Chest wall pain        Dose:  1000 mg   Take 2 tablets (1,000 mg) by mouth every 6 hours as needed for mild pain   Refills:  0       diphenhydrAMINE 50 MG capsule   Commonly known as:  BENADRYL   Used for:  Urticaria        Dose:  50 mg   Take 1 capsule (50 mg) by mouth every 6 hours as needed for itching   Quantity:  56 capsule   Refills:  0       fluticasone 50 MCG/ACT spray   Commonly known as:  FLONASE        Dose:  1 spray   Spray 1 spray into both nostrils daily   Refills:  0       ipratropium - albuterol 0.5 mg/2.5 mg/3 mL 0.5-2.5 (3) MG/3ML neb solution   Commonly known as:  DUONEB   Used for:  Other emphysema (H)        Dose:  3 mL   Take 1 vial (3 mLs) by nebulization every 4 hours as needed for shortness of breath / dyspnea or wheezing   Quantity:  75 mL   Refills:  0         STOP taking     sodium chloride 0.65 % nasal spray   Commonly known as:  OCEAN                Where to get your medicines      These medications were [facility] to [place], [place] - [address]   [facility] [address]     Phone:  [phone]     aspirin 81 MG EC tablet    atorvastatin 40 MG tablet    fluticasone-salmeterol 250-50 MCG/DOSE diskus inhaler    ipratropium - albuterol 0.5 mg/2.5 mg/3 mL 0.5-2.5 (3) MG/3ML neb solution    lisinopril 5 MG tablet    tiotropium 18 MCG capsule                Protect others around you: Learn how to safely use, store and throw away  your medicines at www.disposemymeds.org.             Medication List: This is a list of all your medications and when to take them. Check marks below indicate your daily home schedule. Keep this list as a reference.      Medications           Morning Afternoon Evening Bedtime As Needed    acetaminophen 500 MG tablet   Commonly known as:  TYLENOL   Take 2 tablets (1,000 mg) by mouth every 6 hours as needed for mild pain                                albuterol 108 (90 BASE) MCG/ACT Inhaler   Commonly known as:  PROAIR HFA/PROVENTIL HFA/VENTOLIN HFA   Inhale 2 puffs into the lungs 4 times daily For 5 days, then use 2 puffs every 4 hours as needed   Last time this was given:  2 puffs on 6/23/2017  9:07 AM                                   aspirin 81 MG EC tablet   Take 1 tablet (81 mg) by mouth daily   Last time this was given:  81 mg on 6/23/2017  8:11 AM                                   atorvastatin 40 MG tablet   Commonly known as:  LIPITOR   Take 1 tablet (40 mg) by mouth daily   Last time this was given:  40 mg on 6/23/2017  7:09 AM                                   diphenhydrAMINE 50 MG capsule   Commonly known as:  BENADRYL   Take 1 capsule (50 mg) by mouth every 6 hours as needed for itching   Last time this was given:  50 mg on 6/22/2017 12:40 PM                                fluticasone 50 MCG/ACT spray   Commonly known as:  FLONASE   Spray 1 spray into both nostrils daily   Last time this was given:  1 spray on 6/23/2017  9:07 AM                                   fluticasone-salmeterol 250-50 MCG/DOSE diskus inhaler   Commonly known as:  ADVAIR   Inhale 1 puff into the lungs 2 times daily                                      ipratropium - albuterol 0.5 mg/2.5 mg/3 mL 0.5-2.5 (3) MG/3ML neb solution   Commonly known as:  DUONEB   Take 1 vial (3 mLs) by nebulization every 4 hours as needed for shortness of breath / dyspnea or wheezing   Last time this was given:  3 mLs on 6/22/2017  8:22 PM                                    lisinopril 5 MG tablet   Commonly known as:  PRINIVIL/ZESTRIL   Take 1 tablet (5 mg) by mouth daily   Last time this was given:  5 mg on 6/23/2017  8:11 AM                                   tiotropium 18 MCG capsule   Commonly known as:  SPIRIVA HANDIHALER   Inhale contents of one capsule daily.

## 2017-06-22 NOTE — IP AVS SNAPSHOT
[facility] [address] [facility]    [address] [place] [address]    Phone:  [phone]                                       After Visit Summary   6/22/2017    Eliseo Davis    MRN: [id number]           After Visit Summary Signature Page     I have received my discharge instructions, and my questions have been answered. I have discussed any challenges I see with this plan with the nurse or doctor.    ..........................................................................................................................................  Patient/Patient Representative Signature      ..........................................................................................................................................  Patient Representative Print Name and Relationship to Patient    ..................................................               ................................................  Date                                            Time    ..........................................................................................................................................  Reviewed by Signature/Title    ...................................................              ..............................................  Date                                                            Time

## 2017-06-22 NOTE — ED NOTES
Pt states he needed to urinate, a urinal was given to pt and he was asked not to get out of bed without assist. When writer entered room with nasal spray pt was standing at bedside. Pt assisted back to bed.

## 2017-06-22 NOTE — IP AVS SNAPSHOT
"[facility] [address] [facility]: [phone]                                              INTERAGENCY TRANSFER FORM - PHYSICIAN ORDERS   2017                    Hospital Admission Date: 2017  MINO LEY   : 1960  Sex: Male        Attending Provider: Ernie Padgett MD     Allergies:  Penicillins, Dust Mites, Pollen Extract    Infection:  None   Service:  HOSPITALIST    Ht:  1.753 m (5' 9\")   Wt:  104.1 kg (229 lb 8 oz)   Admission Wt:  104.1 kg (229 lb 8 oz)    BMI:  33.89 kg/m 2   BSA:  2.25 m 2            Patient PCP Information     None on File      ED Clinical Impression     Diagnosis Description Comment Added By Time Added    Coronary artery disease involving native coronary artery of native heart without angina pectoris [I25.10] Coronary artery disease involving native coronary artery of native heart without angina pectoris [I25.10]  Rome Gomez MD 2017 12:02 PM    Other emphysema (H) [J43.8] Other emphysema (H) [J43.8]  Rome Gomez MD 2017 12:02 PM    Reactive airway disease with wheezing with acute exacerbation [J45.901] Reactive airway disease with wheezing with acute exacerbation [J45.901]  Edison Sewell RN 2017 12:44 PM      Hospital Problems as of 2017              Priority Class Noted POA    ACS (acute coronary syndrome) (H) Medium  2017 Yes      Non-Hospital Problems as of 2017              Priority Class Noted    CARDIOVASCULAR SCREENING; LDL GOAL LESS THAN 130 Medium  2013    Thoracic back pain   2013    Back muscle spasm   2013    Acute respiratory failure with hypoxia (H) Medium  2017    Reactive airway disease with wheezing with acute exacerbation Medium  2017    Obesity Medium  2017    Neutrophilic leukocytosis Medium  2017    Pulmonary nodule - 5 mm RUL (high risk pt) Medium  2017    Urticaria Medium  2017      Code Status History     Date Active Date Inactive " Code Status Order ID Comments User Context    6/23/2017 12:06 PM  Full Code [id number]  Rome Gomez MD Outpatient    6/22/2017  3:33 AM 6/23/2017 12:06 PM Full Code [id number]  Ernie Padgett MD Inpatient    6/13/2017 10:49 AM 6/22/2017  3:33 AM Full Code [id number]  Nuno Pandey MD Outpatient    6/11/2017 10:49 PM 6/13/2017 10:49 AM Full Code [id number]  Donell Barros MD Inpatient         Medication Review      START taking        Dose / Directions Comments    aspirin 81 MG EC tablet   Used for:  Coronary artery disease involving native coronary artery of native heart without angina pectoris        Dose:  81 mg   Take 1 tablet (81 mg) by mouth daily   Quantity:  30 tablet   Refills:  3        atorvastatin 40 MG tablet   Commonly known as:  LIPITOR   Used for:  Coronary artery disease involving native coronary artery of native heart without angina pectoris        Dose:  40 mg   Take 1 tablet (40 mg) by mouth daily   Quantity:  30 tablet   Refills:  1        fluticasone-salmeterol 250-50 MCG/DOSE diskus inhaler   Commonly known as:  ADVAIR   Used for:  Other emphysema (H)        Dose:  1 puff   Inhale 1 puff into the lungs 2 times daily   Quantity:  1 Inhaler   Refills:  1        lisinopril 5 MG tablet   Commonly known as:  PRINIVIL/ZESTRIL   Used for:  Coronary artery disease involving native coronary artery of native heart without angina pectoris        Dose:  5 mg   Take 1 tablet (5 mg) by mouth daily   Quantity:  30 tablet   Refills:  1        tiotropium 18 MCG capsule   Commonly known as:  SPIRIVA HANDIHALER   Used for:  Other emphysema (H)        Inhale contents of one capsule daily.   Quantity:  30 capsule   Refills:  1          CONTINUE these medications which may have CHANGED, or have new prescriptions. If we are uncertain of the size of tablets/capsules you have at home, strength may be listed as something that might have changed.        Dose / Directions Comments    albuterol 108 (90  BASE) MCG/ACT Inhaler   Commonly known as:  PROAIR HFA/PROVENTIL HFA/VENTOLIN HFA   This may have changed:    - how much to take  - when to take this  - reasons to take this  - additional instructions   Used for:  Reactive airway disease with wheezing with acute exacerbation        Dose:  2 puff   Inhale 2 puffs into the lungs 4 times daily For 5 days, then use 2 puffs every 4 hours as needed   Quantity:  1 Inhaler   Refills:  0          CONTINUE these medications which have NOT CHANGED        Dose / Directions Comments    acetaminophen 500 MG tablet   Commonly known as:  TYLENOL   Used for:  Chest wall pain        Dose:  1000 mg   Take 2 tablets (1,000 mg) by mouth every 6 hours as needed for mild pain   Refills:  0        diphenhydrAMINE 50 MG capsule   Commonly known as:  BENADRYL   Used for:  Urticaria        Dose:  50 mg   Take 1 capsule (50 mg) by mouth every 6 hours as needed for itching   Quantity:  56 capsule   Refills:  0        fluticasone 50 MCG/ACT spray   Commonly known as:  FLONASE        Dose:  1 spray   Spray 1 spray into both nostrils daily   Refills:  0        ipratropium - albuterol 0.5 mg/2.5 mg/3 mL 0.5-2.5 (3) MG/3ML neb solution   Commonly known as:  DUONEB   Used for:  Other emphysema (H)        Dose:  3 mL   Take 1 vial (3 mLs) by nebulization every 4 hours as needed for shortness of breath / dyspnea or wheezing   Quantity:  75 mL   Refills:  0          STOP taking     sodium chloride 0.65 % nasal spray   Commonly known as:  OCEAN                   After Care     Activity       Your activity upon discharge: activity as tolerated       Diet       Follow this diet upon discharge: Orders Placed This Encounter      Low Saturated Fat Diet             Referrals     Follow-Up with Cardiac Advanced Practice Provider           Pulmonary Medicine Referral                 Follow-Up Appointment Instructions     Future Labs/Procedures    Follow-up and recommended labs and tests      Comments:    -Follow  up with PCP in 1-2 weeks. Recommended lab: BMP  -Follow up with pulmonology clinic in 2-4 weeks  -CT chest in 12 months for follow up on lung nodule      Follow-Up Appointment Instructions     Follow-up and recommended labs and tests        -Follow up with PCP in 1-2 weeks. Recommended lab: BMP  -Follow up with pulmonology clinic in 2-4 weeks  -CT chest in 12 months for follow up on lung nodule             Statement of Approval     Ordered          06/23/17 1209  I have reviewed and agree with all the recommendations and orders detailed in this document.  EFFECTIVE NOW     Approved and electronically signed by:  Rome Gomez MD

## 2017-06-22 NOTE — ED NOTES
Pt was given a urinal to use upon changing rooms. When writer entered the room approximately 5 minutes after, pt was in a panic state. Very short of breath with his O2 off. Pt was on all fours on the bed very anxious. Pt was talked down. Oxymask placed again. Pt calmed down and offered a neb since he was still somewhat short of breath. Pt agreed to plan. S[facility] Gaytan notified. Nebulizer started. O2 saturation was WDL during this episode. Will continue to monitor.

## 2017-06-22 NOTE — CONSULTS
"Mr Davis has a long history of asthma but more recently, episodes of \"band-like\" chest pain with increased dyspnea.  Have recommended coronary angiogram and discussed risks, benefits and alternatives and he has agreed to proceed.    Full consult to follow.  "

## 2017-06-22 NOTE — ED PROVIDER NOTES
History     Chief Complaint   Patient presents with     Shortness of Breath     HPI  Eliseo Davis is a 56 year old male who presents to the ER via ambulance with concerns about SOB symptoms stating he thinks his pneumonia is getting worse. He states he has been SOB since 6/7/17 and was admitted to this hospital on 6/11/17 and told he had a pneumonia. He states he was treated with a steroid course for 5 days which he has finished but feels not improved with increased SOB symptoms today. EMS reported that the patient was in extreme respiratory distress on arrival to his home. They initiated C-PAP and gave IM terbutaline in transport to the ER. He states he does feel some improved since the BiPAP started. EMS stated his HCO3 was in the 60's with reduced O2 sats with wheezing noted. The patient states he stopped smoking in January this year. He continues on BIPAP at the time of the exam.       I have reviewed the Medications, Allergies, Past Medical and Surgical History, and Social History in the Epic system.    Allergies:   Allergies   Allergen Reactions     Penicillins Anaphylaxis     Dust Mites      Pollen Extract          No current facility-administered medications on file prior to encounter.   Current Outpatient Prescriptions on File Prior to Encounter:  acetaminophen (TYLENOL) 500 MG tablet Take 2 tablets (1,000 mg) by mouth every 6 hours as needed for mild pain (Patient taking differently: Take 325 mg by mouth every 6 hours as needed for mild pain )   diphenhydrAMINE (BENADRYL) 50 MG capsule Take 1 capsule (50 mg) by mouth every 6 hours as needed for itching (Patient taking differently: Take 50 mg by mouth every 6 hours as needed for other (swelling) )   albuterol (PROAIR HFA/PROVENTIL HFA/VENTOLIN HFA) 108 (90 BASE) MCG/ACT Inhaler Inhale 2 puffs into the lungs 4 times daily For 5 days, then use 2 puffs every 4 hours as needed (Patient taking differently: Inhale 1-2 puffs into the lungs every 4 hours as needed  "For 5 days, then use 2 puffs every 4 hours as needed)   fluticasone (VERAMYST) 27.5 MCG/SPRAY spray Spray 1 spray into both nostrils daily    ipratropium - albuterol 0.5 mg/2.5 mg/3 mL (DUONEB) 0.5-2.5 (3) MG/3ML neb solution Take 1 vial (3 mLs) by nebulization every 4 hours as needed for shortness of breath / dyspnea or wheezing       Patient Active Problem List   Diagnosis     CARDIOVASCULAR SCREENING; LDL GOAL LESS THAN 130     Thoracic back pain     Back muscle spasm     Acute respiratory failure with hypoxia (H)     Reactive airway disease with wheezing with acute exacerbation     Obesity     Neutrophilic leukocytosis     Pulmonary nodule - 5 mm RUL (high risk pt)     Urticaria       History reviewed. No pertinent surgical history.    Social History   Substance Use Topics     Smoking status: Former Smoker     Packs/day: 1.00     Years: 35.00     Types: Cigarettes     Smokeless tobacco: Former User     Quit date: 1/5/2017     Alcohol use Yes      Comment: 2 drinks yesterday         There is no immunization history on file for this patient.    BMI: Estimated body mass index is 35.44 kg/(m^2) as calculated from the following:    Height as of 6/11/17: 1.753 m (5' 9\").    Weight as of this encounter: 108.9 kg (240 lb).      Review of Systems   Constitutional: Positive for activity change and fatigue (patient states that he is unable to walk across the room in his home due to fatigue and shortness of breath symptoms.). Negative for chills and fever.   HENT: Positive for congestion (states that he is using a over-the-counter nasal inhaler that helps with his congestion.). Negative for sore throat and trouble swallowing.    Eyes: Negative.    Respiratory: Positive for chest tightness, shortness of breath and wheezing.    Cardiovascular: Positive for chest pain (midsternal chest pain.  He rates it about a 4-6 out of 10 and refused offer of pain medication for this pain.) and leg swelling (admits to mild swelling in his " "legs over the last 2-3 weeks but states they're pretty good today.). Negative for palpitations.   Gastrointestinal: Negative for abdominal pain, diarrhea, nausea and vomiting.   Endocrine: Negative for polydipsia, polyphagia and polyuria.   Genitourinary: Negative.    Musculoskeletal: Negative.    Skin: Negative for rash.   Neurological: Negative.  Negative for seizures.   Psychiatric/Behavioral: The patient is nervous/anxious.    All other systems reviewed and are negative.      Physical Exam   BP: (!) 158/98  Pulse: 112  Heart Rate: 112  Resp: 22  Height:  (5'8\")  Weight: 108.9 kg (240 lb)  SpO2: 100 %  Physical Exam   Constitutional: He is oriented to person, place, and time. He appears well-developed and well-nourished. He appears distressed (patient in severe respiratory distress using C    -PAP on arrival to the ER via ambulance.).   HENT:   Head: Atraumatic.   Mouth/Throat: Oropharynx is clear and moist.   Eyes: Conjunctivae and EOM are normal. Pupils are equal, round, and reactive to light.   Neck: Normal range of motion. Neck supple.   Cardiovascular: Regular rhythm, intact distal pulses and normal pulses.   No extrasystoles are present. Tachycardia present.    Murmur heard.  Negative edema     Pulmonary/Chest: Accessory muscle usage present. Tachypnea noted. He is in respiratory distress. He has no decreased breath sounds. He has wheezes. He has no rhonchi. He has no rales.   Abdominal: Soft. Normal appearance. He exhibits no pulsatile midline mass and no mass. There is no tenderness.   Obese     Musculoskeletal: Normal range of motion.   Neurological: He is alert and oriented to person, place, and time. He has normal strength.   Skin: Skin is warm and intact. No rash noted. He is diaphoretic.   Psychiatric: Thought content normal. His mood appears anxious. His speech is delayed (secondary to BIPAP and respiratory distress.). Cognition and memory are normal.   Nursing note and vitals reviewed.      ED " Course     ED Course     Procedures             EKG Interpretation: #1     Interpreted by Keven Koehler  Time reviewed: 19:00  Symptoms at time of EKG: Severe dyspnea   Rhythm: sinus tachycardia  Rate: 100-110  Axis: Normal  Ectopy: none  Conduction: normal  ST Segments/ T Waves: T wave peaking V2 and V3  Q Waves: none  Comparison to prior: Unchanged from 6/11/17 except for the peaked T-waves in V2-3    Clinical Impression: non-specific EKG, possible early acute ischemia with hyperacute T-waves.         EKG Interpretation: #2     Interpreted by Keven Koehler  Time reviewed:21;03   Symptoms at time of EKG: improved SOB and chest pain.   Rhythm: normal sinus   Rate: normal  Axis: NORMAL  Ectopy: none  Conduction: normal  ST Segments/ T Waves: No ST-T wave changes  Q Waves: none  Comparison to prior: Unchanged from above    Clinical Impression: Hyperacute T-waves V2-3    Critical Care time:  none            Results for orders placed or performed during the hospital encounter of 06/21/17 (from the past 24 hour(s))   Lactic acid whole blood   Result Value Ref Range    Lactic Acid 3.4 (H) 0.7 - 2.1 mmol/L   Troponin I   Result Value Ref Range    Troponin I ES  0.000 - 0.045 ug/L     <0.015  The 99th percentile for upper reference range is 0.045 ug/L.  Troponin values in   the range of 0.045 - 0.120 ug/L may be associated with risks of adverse   clinical events.     D dimer quantitative   Result Value Ref Range    D Dimer 0.3 0.0 - 0.50 ug/ml FEU   CBC with platelets differential   Result Value Ref Range    WBC 12.6 (H) 4.0 - 11.0 10e9/L    RBC Count 4.78 4.4 - 5.9 10e12/L    Hemoglobin 14.9 13.3 - 17.7 g/dL    Hematocrit 46.1 40.0 - 53.0 %    MCV 96 78 - 100 fl    MCH 31.2 26.5 - 33.0 pg    MCHC 32.3 31.5 - 36.5 g/dL    RDW 14.5 10.0 - 15.0 %    Platelet Count 399 150 - 450 10e9/L    Diff Method Automated Method     % Neutrophils 54.5 %    % Lymphocytes 28.6 %    % Monocytes 9.1 %    % Eosinophils 6.9 %     % Basophils 0.3 %    % Immature Granulocytes 0.6 %    Absolute Neutrophil 6.9 1.6 - 8.3 10e9/L    Absolute Lymphocytes 3.6 0.8 - 5.3 10e9/L    Absolute Monocytes 1.2 0.0 - 1.3 10e9/L    Absolute Eosinophils 0.9 (H) 0.0 - 0.7 10e9/L    Absolute Basophils 0.0 0.0 - 0.2 10e9/L    Abs Immature Granulocytes 0.1 0 - 0.4 10e9/L   Comprehensive metabolic panel   Result Value Ref Range    Sodium 140 133 - 144 mmol/L    Potassium 4.2 3.4 - 5.3 mmol/L    Chloride 104 94 - 109 mmol/L    Carbon Dioxide 27 20 - 32 mmol/L    Anion Gap 9 3 - 14 mmol/L    Glucose 94 70 - 99 mg/dL    Urea Nitrogen 12 7 - 30 mg/dL    Creatinine 1.18 0.66 - 1.25 mg/dL    GFR Estimate 64 >60 mL/min/1.7m2    GFR Estimate If Black 77 >60 mL/min/1.7m2    Calcium 8.2 (L) 8.5 - 10.1 mg/dL    Bilirubin Total 0.5 0.2 - 1.3 mg/dL    Albumin 3.6 3.4 - 5.0 g/dL    Protein Total 7.3 6.8 - 8.8 g/dL    Alkaline Phosphatase 94 40 - 150 U/L    ALT 40 0 - 70 U/L    AST 24 0 - 45 U/L   Blood gas venous   Result Value Ref Range    Ph Venous 7.37 7.32 - 7.43 pH    PCO2 Venous 45 40 - 50 mm Hg    PO2 Venous 177 (H) 25 - 47 mm Hg    Bicarbonate Venous 26 21 - 28 mmol/L    Base Excess Venous 0.7 mmol/L    FIO2 21    Nt probnp inpatient   Result Value Ref Range    N-Terminal Pro BNP Inpatient 46 0 - 900 pg/mL   CRP inflammation   Result Value Ref Range    CRP Inflammation 14.4 (H) 0.0 - 8.0 mg/L   XR Chest 2 Views    Narrative    CHEST TWO VIEWS  6/21/2017 8:34 PM      HISTORY: Shortness of breath     COMPARISON: 6/11/2017.    FINDINGS: The heart size is normal. The thoracic aorta is calcified.  The lungs are clear. Calcified lymph nodes in the right hilum and  mediastinum. No pneumothorax or pleural effusion. Old posterior eighth  rib fracture.      Impression    IMPRESSION: No acute abnormality.    WILLA JEAN BAPTISTE MD   Troponin I   Result Value Ref Range    Troponin I ES 0.393 () 0.000 - 0.045 ug/L         Assessments & Plan (with Medical Decision Making)  19:14 Lactic  acid level elevated. Likely related to his hypoxia do to asthma exacerbation but also could be an indication for sepsis. Will initate treatment with antibiotic as soon as blood cultures drawn. Allergy to PCNs so will initiate Meropenum per sepsis/pneumonia ED order set recommendations.  Patient's x-ray of the chest was unremarkable.  Patient continues to have chest pain despite the improvement in his respiratory distress.  He was able to wean from BiPAP.  Initial cardiac evaluation remarkable for mildly hyperacute T waves in V2-V3.  Cardiac enzymes are negative, d-dimer negative, and BMP in the normal range.  Because of the continued chest pain midsternal Nitropaste was ordered along with an oral aspirin and will plan to repeat EKG and cardiac enzymes 2 hours after arrival.    Repeat EKG essentially unchanged however his troponin is not elevated at 0.393 suggestive of non-STEMI as reason for his acute respiratory distress episode and chest pain.  Nitro paste wiped off and a nitro drip initiated.  Heparin bolus and heparin drip initiated as well.  Patient's pain is improved describing it as a 1 out of 10 at this time.  I contacted the [facility] system and they stated that their system's beds were full and they suggested that we keep the patient or transfer to a hospital suitable for his condition.  I discussed the patient's condition with the patient and we have elected to transfer the patient to [facility] for cardiac evaluation.  I spoke with the hospitalist at [facility], Dr. Angel.  He agreed to accept the patient transferred to the facility.  EMTALA and transfer forms were filled out and signed. AVS was printed and sent with the patient and EMS crew. The patient will be transported by ALS ambulance to the [facility].     I have reviewed the nursing notes.    I have reviewed the findings, diagnosis, plan and need for transfer with the patient.       HEART  Score  Background  Calculates the overall risk of adverse event in patient's presenting with chest pain.  Based on 5 criteria (each assigned 0-2 points) including suspiciousness of history, EKG, age, risk factors and troponin.    Data  56 year old male  has CARDIOVASCULAR SCREENING; LDL GOAL LESS THAN 130; Thoracic back pain; Back muscle spasm; Acute respiratory failure with hypoxia (H); Reactive airway disease with wheezing with acute exacerbation; Obesity; Neutrophilic leukocytosis; Pulmonary nodule - 5 mm RUL (high risk pt); and Urticaria on his problem list.   reports that he has quit smoking. His smoking use included Cigarettes. He has a 35.00 pack-year smoking history. He quit smokeless tobacco use about 5 months ago.  family history is not on file.  Lab Results   Component Value Date    TROPI 0.393 06/21/2017     Criteria   0-2 points for each of 5 items (maximum of 10 points):  Score 1- History moderately suspicious for coronary syndrome  Score 1- EKG with Non-specific repolarization disturbance  Score 1- Age 45 to 65 years old  Score 1- One to 2 risk factors for atherosclerotic disease  Score 2- More than twice the normal troponin upper limit  Interpretation  Risk of adverse outcome  Heart Score: 6  Total Score 4-6- Adverse Outcome Risk 20.3% - Supports admission with standard rule-out management -serial troponins and stress testing      Final diagnoses:   Acute respiratory failure with hypoxia (H)   Acidosis   Acute chest pain   NSTEMI (non-ST elevated myocardial infarction) (H)   Troponin level elevated       6/21/2017   [facility] EMERGENCY DEPARTMENT     Keven Koehler,   06/22/17 0058

## 2017-06-22 NOTE — H&P
"PRIMARY CARE PROVIDER:  None given.      CHIEF COMPLAINT:  Shortness of breath and chest pain.      HISTORY OF PRESENT ILLNESS:  Eliseo Davis a pleasant 56-year-old male with a past medical history notable for recent hospitalization for reactive airway disease treated with steroids, history of tobacco abuse, who presents with shortness of breath and chest pain.  The patient was hospitalized at [facility] from 06/11 to 06/13/2017 and states that he never fully recovered after that hospitalization in terms of his shortness of breath.  He feels that his exercise tolerance has been diminished over the past 6 months and has subacutely worsened over the past 3 weeks.  He notes that even with minimal activity such as climbing a ladder at work or up a flight of stairs he becomes short of breath.  What prompted his evaluation today was that he developed chest pain yesterday evening while at rest watching TV at home.  He notes that he has had chest pain before with exertion, but was never this severe and rates it at a \"20/10.\"  It is in a band-like distribution over the left part of his chest with radiation into his left shoulder.  He notes that in the past whenever he takes a break from his activities, the chest discomfort and shortness of breath resolve after about 5-10 minutes.  He was given nitroglycerin in the ER prior to transfer to [facility] and he felt that this also improved his shortness of breath and chest discomfort.  He otherwise denies any fevers, chills, night sweats.  He endorses about a 15-pound weight gain since he quit smoking 5 months ago.  He has not had any nausea, vomiting, diarrhea or any focal weakness.  He does feel that his left leg is a little bit weaker over the past few days and sometimes notices his left foot catching the ground but otherwise denies any pain, numbness or tingling in any extremities.  He otherwise denies any dizziness, lightheadedness, palpitations or loss of " consciousness.  Since leaving the hospital in the second week of June he has not had any more wheezing.  He was sent home with albuterol and DuoNebs, however, he feels that these did not help his symptoms whatsoever.  He actually took about 4 or 5 breathing treatments prior to his presentation in the ER yesterday and felt that this did not provide any relief.  The patient's present chest pain is rated at 1/10.      PAST MEDICAL HISTORY:   1.  Notable for reactive airway disease with acute hypoxic respiratory failure.   2.  Right upper lobe pulmonary nodule.   3.  Urticaria.      MEDICATIONS:  Albuterol inhaler and DuoNebs along with fluticasone, Benadryl and Tylenol.      ALLERGIES:  Penicillins, dust mites, pollen extract.  Penicillins do cause facial swelling; however, he has not required intubation for this.      SOCIAL HISTORY:  The patient smoked 1 pack a day for over 40 years up until 5 months ago.  He denies any significant alcohol use.  He works in a [employer] plant, driving trucks.  He does wear protective gear, but on occasion is exposed to inorganic dusts and chemicals.  He lives with his brother.      FAMILY HISTORY:  Denies any family history in his parents or siblings of heart disease, strokes.  His eldest brother does have diabetes.      REVIEW OF SYSTEMS:  A 10-point review of systems was performed and negative except as per HPI.      PHYSICAL EXAMINATION:   VITAL SIGNS:  Temperature afebrile, heart rate 90s to low 100s, blood pressure is 130s-140s systolic over 70s-90s diastolic, respirations 10-20, satting greater than 91% on room air.  Weight is 104.1 kilograms.   GENERAL:  No acute distress, comfortably sitting upright in bed, no family present.  Appears stated age.   HEENT:  Normocephalic, atraumatic, moist mucous membranes, anicteric sclerae, uvula midline.   NECK:  Large but supple, trachea midline, no lymphadenopathy appreciated.   CARDIOVASCULAR:  Tachycardic but regular rhythm, no  additional heart sounds.  No JVD elevation.   PULMONARY:  Clear to auscultation bilaterally, no wheezes, rhonchi or rales.   GASTROINTESTINAL:  Bowel sounds positive.  Soft, nondistended, nontender.  No rebound or guarding.   EXTREMITIES:  No edema, DP pulses equal bilaterally.  Warm extremities.   NEUROLOGIC:  Moves all 4 extremities against resistance.  Cranial nerves II-XII grossly intact.   PSYCHIATRIC:  Appropriate mood and affect.  Oriented x3.      DIAGNOSTIC DATA:  Notable for calcium of 8.2.  CRP of 14.4.  Lactic acid 3.4.  Initial troponin negative, recheck troponin was 0.393.  Recheck lactic acid was 1.7, creatinine of 1.18 with a GFR of 64.  CBC notable for mildly elevated eosinophils and white count 12.6.  VBG shows pH of 7.37, pCO2 of 45, pO2 of 177.  Blood cultures are pending.  Chest x-ray shows no acute findings.  EKG on my initial interpretation shows sinus rhythm with somewhat peaked T waves in the anterior leads, otherwise no other changes on my initial read.  QTc is estimated at 438.      ASSESSMENT:  Eliseo Davis is a pleasant 56-year-old male with no known previous cardiac history who presents with persistent shortness of breath and acute onset of chest pain yesterday concerning for acute coronary syndrome.  He was found to have an elevated troponin and subsequently transferred to [facility] for further evaluation.      PLAN:   1.  Non-ST elevation myocardial infarction:  Risk factors include obesity, 40-pack-year smoking history.  The patient is hemodynamically stable and is admitted to the CICU.   -Cardiac monitoring.   -Trend troponins.   -Aspirin, metoprolol, Lipitor and lisinopril with hold parameters have been started.   -Continue with heparin drip and nitro drip that was started prior to transfer.   -Echocardiogram has been ordered along with Cardiology consult.   -Will keep n.p.o. except for medications.     2.  History of reactive airway disease:  The patient was recently  hospitalized at [facility] from  to  and treated with steroids.  He does have a mild elevation in his eosinophils on differential along with a white count of 12.6.  His initial lactic acid was 3.4, however, his chest x-ray does not show any acute infiltrates and he is denying fevers, chills or other infectious symptoms.  Recheck lactic acid is improved from 3.4 to 1.7.   -Continue p.r.n. DuoNebs and albuterol inhaler.   -No wheezing on examination.   -Will check a procalcitonin.   -Blood cultures are pending.   -No abx ordered at this time, monitor fever curve    3.  Deep venous thrombosis prophylaxis:  The patient is on heparin drip.     4.  I personally discussed code status with the patient and he elects to be a full code.      SARAH COVARRUBIAS MD      D: 2017 04:05   T: 2017 04:42   MT: lg      Name:     MINO LEY   MRN:      [id number]        Account:      [id number]   :      1960           Admitted:     [id number]      Document: [id number]

## 2017-06-23 ENCOUNTER — APPOINTMENT (OUTPATIENT)
Dept: OCCUPATIONAL THERAPY | Facility: CLINIC | Age: 57
DRG: 282 | End: 2017-06-23
Attending: INTERNAL MEDICINE
Payer: COMMERCIAL

## 2017-06-23 VITALS
BODY MASS INDEX: 33.99 KG/M2 | OXYGEN SATURATION: 93 % | DIASTOLIC BLOOD PRESSURE: 67 MMHG | TEMPERATURE: 97.6 F | RESPIRATION RATE: 20 BRPM | SYSTOLIC BLOOD PRESSURE: 114 MMHG | WEIGHT: 229.5 LBS | HEIGHT: 69 IN

## 2017-06-23 PROCEDURE — 25000132 ZZH RX MED GY IP 250 OP 250 PS 637: Performed by: INTERNAL MEDICINE

## 2017-06-23 PROCEDURE — 97535 SELF CARE MNGMENT TRAINING: CPT | Mod: GO

## 2017-06-23 PROCEDURE — 97165 OT EVAL LOW COMPLEX 30 MIN: CPT | Mod: GO

## 2017-06-23 PROCEDURE — 97110 THERAPEUTIC EXERCISES: CPT | Mod: GO

## 2017-06-23 PROCEDURE — 99231 SBSQ HOSP IP/OBS SF/LOW 25: CPT | Performed by: INTERNAL MEDICINE

## 2017-06-23 PROCEDURE — 94640 AIRWAY INHALATION TREATMENT: CPT | Mod: 76

## 2017-06-23 PROCEDURE — 25000125 ZZHC RX 250: Performed by: INTERNAL MEDICINE

## 2017-06-23 PROCEDURE — 94640 AIRWAY INHALATION TREATMENT: CPT

## 2017-06-23 PROCEDURE — 40000275 ZZH STATISTIC RCP TIME EA 10 MIN

## 2017-06-23 PROCEDURE — 99239 HOSP IP/OBS DSCHRG MGMT >30: CPT | Performed by: INTERNAL MEDICINE

## 2017-06-23 PROCEDURE — 40000133 ZZH STATISTIC OT WARD VISIT

## 2017-06-23 RX ORDER — TIOTROPIUM BROMIDE 18 UG/1
CAPSULE ORAL; RESPIRATORY (INHALATION)
Qty: 30 CAPSULE | Refills: 1 | Status: SHIPPED | OUTPATIENT
Start: 2017-06-23 | End: 2018-06-11

## 2017-06-23 RX ORDER — IPRATROPIUM BROMIDE AND ALBUTEROL SULFATE 2.5; .5 MG/3ML; MG/3ML
3 SOLUTION RESPIRATORY (INHALATION) EVERY 4 HOURS PRN
Qty: 75 ML | Refills: 0 | Status: SHIPPED | OUTPATIENT
Start: 2017-06-23 | End: 2019-11-21

## 2017-06-23 RX ORDER — ALBUTEROL SULFATE 0.83 MG/ML
2.5 SOLUTION RESPIRATORY (INHALATION)
Status: DISCONTINUED | OUTPATIENT
Start: 2017-06-23 | End: 2017-06-23 | Stop reason: HOSPADM

## 2017-06-23 RX ORDER — LISINOPRIL 5 MG/1
5 TABLET ORAL DAILY
Qty: 30 TABLET | Refills: 1 | Status: ON HOLD | OUTPATIENT
Start: 2017-06-23 | End: 2019-02-16

## 2017-06-23 RX ORDER — ATORVASTATIN CALCIUM 40 MG/1
40 TABLET, FILM COATED ORAL DAILY
Qty: 30 TABLET | Refills: 1 | Status: ON HOLD | OUTPATIENT
Start: 2017-06-23 | End: 2019-02-16

## 2017-06-23 RX ADMIN — LISINOPRIL 5 MG: 5 TABLET ORAL at 08:11

## 2017-06-23 RX ADMIN — ALBUTEROL SULFATE 2 PUFF: 90 AEROSOL, METERED RESPIRATORY (INHALATION) at 09:07

## 2017-06-23 RX ADMIN — CARVEDILOL 6.25 MG: 6.25 TABLET, FILM COATED ORAL at 08:11

## 2017-06-23 RX ADMIN — FLUTICASONE PROPIONATE 1 SPRAY: 50 SPRAY, METERED NASAL at 09:07

## 2017-06-23 RX ADMIN — ASPIRIN 81 MG: 81 TABLET, COATED ORAL at 08:11

## 2017-06-23 RX ADMIN — ATORVASTATIN CALCIUM 40 MG: 40 TABLET, FILM COATED ORAL at 07:09

## 2017-06-23 RX ADMIN — ALBUTEROL SULFATE 2.5 MG: 2.5 SOLUTION RESPIRATORY (INHALATION) at 08:47

## 2017-06-23 RX ADMIN — ALBUTEROL SULFATE 2.5 MG: 2.5 SOLUTION RESPIRATORY (INHALATION) at 12:46

## 2017-06-23 ASSESSMENT — ACTIVITIES OF DAILY LIVING (ADL): PREVIOUS_RESPONSIBILITIES: MEAL PREP;HOUSEKEEPING;LAUNDRY;SHOPPING;YARDWORK;MEDICATION MANAGEMENT;FINANCES;DRIVING

## 2017-06-23 NOTE — PROGRESS NOTES
06/23/17 1500   Pain Assessment   Patient Currently in Pain No   Range of Motion (ROM)   ROM Quick Adds No deficits were identified   Strength   Manual Muscle Testing Quick Adds No deficits were identified   Balance   Balance Comments Indep no AD   Instrumental Activities of Daily Living (IADL)   Previous Responsibilities meal prep;housekeeping;laundry;shopping;yardwork;medication management;finances;driving   Activities of Daily Living Analysis   ADL Comments Indep with self-cares and mob in room   Clinical Impression   Criteria for Skilled Therapeutic Interventions Met yes, treatment indicated   OT Diagnosis decreased activity tolerance affecting ADL/IADL performance   Influenced by the following impairments generalized weakness/decreased activity tolerance   Assessment of Occupational Performance 1-3 Performance Deficits   Identified Performance Deficits household mgmt, community mobility, social skills   Clinical Decision Making (Complexity) Low complexity   Predicted Duration of Therapy Intervention (days/wks) Eval and one treatment only as discharging today   Anticipated Equipment Needs at Discharge (recommendations to be made)   Anticipated Discharge Disposition Home with Outpatient Therapy   Risks and Benefits of Treatment have been explained. Yes   Patient, Family & other staff in agreement with plan of care Yes   Total Evaluation Time   Total Evaluation Time (Minutes) 15

## 2017-06-23 NOTE — PROGRESS NOTES
[facility]    Cardiology Progress Note    Date of Service (when I saw the patient): 06/23/2017     Assessment & Plan   Eliseo Davis is a 56 year old male who was admitted on 6/22/2017 from [facility] for increasing dyspnea with band like sensation and trivially abnormal troponin. Hx of asthma, works with fertilizer which exacerbates asthma. Former smoker.  Family hx of CAD.      ACS  -Trop 0.508  -Coronay angiogram found mild-mod nonocclusive CAD, nml LVED pressure  -EF 60-65 % by echo, mild-mod LVH suggesting HTN  Plan:   Risk factor reduction, start lipitor, ASA  Suspect demand caused mild trop elevation.   Needs pulmonary w/u in outpt setting. [facility]  Needs [facility]O f/u in [place] in 1-2 weeks to assess groin site.  Follow up with PMD for BP    Hyperlipdemia  -HDL 75,   Plan: start lipitor 40    Hypertension  -mild to mod left ventricular hypertrophy on echo, 158/98 in ED  -no PTA antihypertensives  Was started on bb and ACEi here. Probably just needs ACEi with COPD        Interval History   Coronary angiogram yesterday with mild-mod dz. No CP now.    Physical Exam   Temp: 97.6  F (36.4  C) Temp src: Oral BP: 114/67   Heart Rate: 72 Resp: 20 SpO2: 93 % O2 Device: None (Room air)    Vitals:    06/22/17 0306   Weight: 104.1 kg (229 lb 8 oz)     Vital Signs with Ranges  Temp:  [97.6  F (36.4  C)-98.3  F (36.8  C)] 97.6  F (36.4  C)  Heart Rate:  [71-94] 72  Resp:  [16-25] 20  BP: (110-134)/(64-88) 114/67  SpO2:  [90 %-95 %] 93 %  I/O last 3 completed shifts:  In: 590 [P.O.:240; I.V.:350]  Out: 385 [Urine:385]    Constitutional     alert and oriented, in no acute distress.     Skin     warm and dry to touch    ENT     no pallor or cyanosis    Neck    Supple, JVP normal    Chest     no tenderness to palpation    Lungs  Inspiratory wheezes    Cardiac  regular rhythm, S1 normal, S2 normal    Abdomen     abdomen soft    Extremities and Back     No edema observed.  Femoral access site  looks C/D/I, no bruit, Tegaderm still on.      Neurological     no gross motor deficits noted, affect appropriate, oriented to time, person and place.    Medications     - MEDICATION INSTRUCTIONS -       - MEDICATION INSTRUCTIONS -       nitroglycerin Stopped (17 1338)     - MEDICATION INSTRUCTIONS -       NaCl 75 mL/hr at 17 1413       albuterol  2.5 mg Nebulization Q6H     fluticasone  1 spray Both Nostrils Daily     lisinopril  5 mg Oral Daily     atorvastatin  40 mg Oral Daily     carvedilol  6.25 mg Oral BID w/meals     diphenhydrAMINE  25 mg Intravenous Once     sodium chloride (PF)  3 mL Intracatheter Q8H     aspirin EC  81 mg Oral Daily       Data   Results for orders placed or performed during the hospital encounter of 17 (from the past 24 hour(s))   Echo Complete with Lumason    Narrative    [id number]  [id number]  [id number]  [id number]^URIAH^D           [facility]  Echocardiography Laboratory  [address] [place] [address]        Name: MINO LEY  MRN: [id number]  : 1960  Study Date: 2017 10:18 AM  Age: 56 yrs  Gender: Male  Patient Location: [facility]  Reason For Study: Chest Pain  Ordering Physician: URIAH LARSON  Performed By: Indra Noriega RDCS     BSA: 2.2 m2  Height: 69 in  Weight: 230 lb  HR: 86  BP: 126/79 mmHg  _____________________________________________________________________________  __        Procedure  Complete Portable Echo Adult. Contrast Lumason.  _____________________________________________________________________________  __        Interpretation Summary     Left ventricular systolic function is normal. The visual ejection fraction is  estimated at 60-65%. There is mild to moderate concentric left ventricular  hypertrophy.  Contrast was used without apparent complications. There is no comparison study  available.  _____________________________________________________________________________  __        Left Ventricle  The  left ventricle is normal in size. There is mild to moderate concentric  left ventricular hypertrophy. Left ventricular systolic function is normal. E  by E prime ratio is less than 8, that likely suggests normal left ventricular  filling pressures. The visual ejection fraction is estimated at 60-65%. No  regional wall motion abnormalities noted.     Right Ventricle  The right ventricle is normal size. The right ventricular systolic function is  normal. The right ventricle is not well visualized.     Atria  Normal left atrial size. Right atrial size is normal. There is no atrial shunt  seen.     Mitral Valve  There is trace mitral regurgitation.        Tricuspid Valve  Normal IVC (1.5-2.5cm) with >50% respiratory collapse; right atrial pressure  is estimated at 5-10mmHg. There is trace tricuspid regurgitation. Right  ventricular systolic pressure could not be approximated due to inadequate  tricuspid regurgitation.     Aortic Valve  No aortic regurgitation is present. No hemodynamically significant valvular  aortic stenosis.     Pulmonic Valve  There is no pulmonic valvular stenosis.     Vessels  Normal size aorta.     Pericardium  The pericardium appears normal.        Rhythm  The rhythm was normal sinus.  _____________________________________________________________________________  __  MMode/2D Measurements & Calculations     IVSd: 1.3 cm  LVIDd: 4.7 cm  LVIDs: 2.9 cm  LVPWd: 1.5 cm  FS: 37.7 %  EDV(Teich): 100.0 ml  ESV(Teich): 32.1 ml  LV mass(C)d: 268.4 grams  LV mass(C)dI: 122.5 grams/m2  Ao root diam: 3.3 cm  LA dimension: 3.8 cm  asc Aorta Diam: 3.3 cm  LA/Ao: 1.1  LA Volume (BP): 46.4 ml  LA Volume Index (BP): 21.2 ml/m2           Doppler Measurements & Calculations  MV E max leighann: 62.4 cm/sec  MV A max leighann: 76.8 cm/sec  MV E/A: 0.81  MV dec time: 0.21 sec  PA acc time: 0.12 sec  Lateral E/e': 4.9  Medial E/e': 8.7           _____________________________________________________________________________  __            Report approved by: Anam Araya 06/22/2017 11:16 AM      Activated clotting time POCT   Result Value Ref Range    Activated Clot Time 149 105 - 167 sec   Inpatient Coronary Angiography Adult Order    Narrative    Cardiac Cath Report    Procedures performed   1. Selective right left coronary angiography  2. Left heart catheterization   3. Right femoral angiography   4. Angio-Seal for hemostasis    Indication    Patient is a 56-year-old male who presents with minimally elevated  troponins. He has had chest pain on and off for 24 hours with no  significant EKG changes.    Narrative    Patient is prepped and draped in appropriate sterile manner. 1%  lidocaine infiltrate right groin. Using modified Seldinger technique  and a micropuncture kit access obtained right common femoral artery.  Coronary angiography is performed using a 6 [place] JL4 and 3 DRC  catheter respectively. Following that a pigtail catheter is placed  into the LV cavity. Pressure measurements undertaken the catheters and  withdrawn to assess fragrance. All films are then reviewed and  Angio-Seal used for hemostasis.    Medications   1. IV contrast 50 cc  2. The patient received 1 mg of Versed for sedation for total 17  minutes. Heart rate, blood pressure, respiration, and patient  responses were monitored throughout the procedure with the RN  assistance.    Hemodynamics    Left ventricular end-diastolic pressure is 13 mmHg.  There is no  gradient on pullback of catheter in aorta    Coronary findings    Coronary circulation is right dominant    LM: Left main is a large-caliber vessel which gives rise to the LAD  and circumflex respectively. There is no significant disease in the  left main.    LAD: Left anterior descending artery is a medium to large caliber  vessel which extends and wraps on apex. This vessel gives rise to  several septal perforators 1 major diagonal vessel in the mid segment  midportion the vessel also has a prominent  myocardial bridge. The  vessel then courses towards the apex and has scattered diffuse disease  of 20-25%. Ostium of the first diagonal vessel has a 25% stenosis.    CFX: Circumflex is a medium size vessel which gives rise to 2 major  obtuse marginal branches in the mid segment. The mid to distal portion  of the circumflex on going into the OM 2 branch has a 30-40% diffuse  stenosis    RCA: Right coronary artery is a medium size dominant vessel. This  vessel gives rise to a conus branch proximally RB marginal branch in  the mid segment and then distally terminates into a medium size. A  medium-sized posterior lateral branch. Midportion the vessel has a 20%  diffuse stenosis.    Summary  1. Mild to moderate nonocclusive coronary artery disease  2. Normal left ventricular end-diastolic pressure    Recommendations   1. Secondary prevention as appropriate  2. Bedrest 2 hours post Angio-Seal    MD Maryam RAINEY APRN, CNP  Cardiology  Pager:  [phone]

## 2017-06-23 NOTE — PROGRESS NOTES
[facility]  Hospitalist Progress Note  Rome Gomez MD    Assessment & Plan   Mr. Eliseo Davis is a pleasant 56-year-old male with no known previous cardiac history who presents with persistent shortness of breath and acute onset of chest pain concerning for acute coronary syndrome.  He was found to have an elevated troponin and subsequently transferred to [facility] for further evaluation.     Chest pain: Etiology unclear.  The patient is hemodynamically stable.  EKG showed NSR with nonspecific T wave abnormality in inf leads.  D dimer 0.3  BNP 46  TnI peak level of 0.508.  FLP: , HDL 75,     Recent Labs  Lab 06/22/17  0731 06/22/17  0325 06/21/17  2135 06/21/17  1855   TROPI 0.508* 0.533* 0.393* <0.015The 99th percentile for upper reference range is 0.045 ug/L.  Troponin values in the range of 0.045 - 0.120 ug/L may be associated with risks of adverse clinical events.     -Coronary angio 06/22 showed mild to moderate non occlusive CAD and NL LV systolic function.  -LVEF 60-65%     -Cont aspirin, Lipitor and lisinopril with hold parameters.       COPD:    40-pack-year smoking history.  He quit smoking in 01/2017.  The patient was recently hospitalized at [facility] from 06/11 to 06/13 for COPD exacerbation and treated with steroids.  He does have a slight elevation in his eosinophils on differential along with a white count of 12.6.  His initial lactic acid was 3.4, however, his chest x-ray did not show any acute infiltrates and he is denying fevers, chills or other infectious symptoms.    -Continue p.r.n. neb  -No wheezing on examination.   -Procalcitonin 0.05.   -Blood cultures NGTD  -No abx ordered at this time, monitor fever curve  -Start Advair and spiriva  -PFT as outpatient to determine the severity of his COPD  -Follow up with pulmonology    RUL pulmonary nodule:  Noted incidentally on CT chest on 06/11, measuring 5 mm.  -Follow up CT in 12 months    CKD  stage 2.  Baseline cr 1-1.2.    Recent Labs  Lab 06/21/17  1855   CR 1.18     Stable.     DVT prophylaxis:  Ambulation     CODE STATUS: FULL CODE.    Disposition: Home today.    D/W RN.  D/W wife.      Interval History   Patient is currently stable. No new event since admission. He feels slightly short of breath. He denies CP.    -Data reviewed today: I reviewed all new labs and imaging over the last 24 hours. I personally reviewed the monitor showing NSR.    Physical Exam   Temp: 97.6  F (36.4  C) Temp src: Oral BP: 114/67   Heart Rate: 72 Resp: 20 SpO2: 93 % O2 Device: None (Room air)    Vitals:    06/22/17 0306   Weight: 104.1 kg (229 lb 8 oz)     Vital Signs with Ranges  Temp:  [97.6  F (36.4  C)-98.3  F (36.8  C)] 97.6  F (36.4  C)  Heart Rate:  [72-85] 72  Resp:  [16-25] 20  BP: (110-134)/(64-88) 114/67  SpO2:  [90 %-95 %] 93 %  I/O's Last 24 hours  I/O last 3 completed shifts:  In: 590 [P.O.:240; I.V.:350]  Out: 385 [Urine:385]    Constitutional: Comfortable, no acute distress  HEENT: No conjunctival pallor.  Neurologic: Awake, alert  Neck: Supple, no elevated JVP  Respiratory: Decreased B/L lung sounds  Cardiovascular: Normal S1 and S2. Regular rhythm and rate, no murmur  GI: Abdomen soft, not tender, not distended  Extremities: No calf tenderness, no edema  Skin/integument: No acute rash, no cyanosis    Medications   All medications were reviewed.    - MEDICATION INSTRUCTIONS -       - MEDICATION INSTRUCTIONS -       nitroglycerin Stopped (06/22/17 1338)     - MEDICATION INSTRUCTIONS -       NaCl 75 mL/hr at 06/22/17 1413       albuterol  2.5 mg Nebulization Q6H     fluticasone  1 spray Both Nostrils Daily     lisinopril  5 mg Oral Daily     atorvastatin  40 mg Oral Daily     carvedilol  6.25 mg Oral BID w/meals     diphenhydrAMINE  25 mg Intravenous Once     sodium chloride (PF)  3 mL Intracatheter Q8H     aspirin EC  81 mg Oral Daily        Data     Recent Labs  Lab 06/22/17  0731 06/22/17  0325  06/21/17  2135 06/21/17  1855   WBC 16.3*  --   --  12.6*   HGB 14.4  --   --  14.9   MCV 94  --   --  96     --   --  399   NA  --   --   --  140   POTASSIUM  --   --   --  4.2   CHLORIDE  --   --   --  104   CO2  --   --   --  27   BUN  --   --   --  12   CR  --   --   --  1.18   ANIONGAP  --   --   --  9   LAUREN  --   --   --  8.2*   GLC  --   --   --  94   ALBUMIN  --   --   --  3.6   PROTTOTAL  --   --   --  7.3   BILITOTAL  --   --   --  0.5   ALKPHOS  --   --   --  94   ALT  --   --   --  40   AST  --   --   --  24   TROPI 0.508* 0.533* 0.393* <0.015The 99th percentile for upper reference range is 0.045 ug/L.  Troponin values in the range of 0.045 - 0.120 ug/L may be associated with risks of adverse clinical events.       No results found for this or any previous visit (from the past 24 hour(s)).

## 2017-06-23 NOTE — PROGRESS NOTES
Care Transition Initial Assessment - RN        Met with: Patient and Family.    DATA   Active Problems:    ACS (acute coronary syndrome) (H)       Cognitive Status: alert and oriented.        Contact information and PCP information verified: Yes    Lives With: sibling(s)  Living Arrangements: mobile home                   Insurance concerns: No Insurance issues identified    ASSESSMENT  Patient currently receives the following services:  None        Identified issues/concerns regarding health management:  Met with patient and spouse to discuss follow up care, Patient stated due to change in his insurance [facility] will [facility]. 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 Noted patient's o2 sats remains in the 90s with activityWriter called [facility] clinic to set up primary and pulmonary appointment. RN at [facility] requested orders and discharge summary to be faxed to the clinic and  will call patient to set an appointment.       PLAN  Financial costs for the patient include None  Patient given options and choices for discharge yes  Patient/family is agreeable to the plan?  Yes  Patient anticipates discharging to  . Home  Patient anticipates needs for home equipment: No  Discharge Planner   Discharge Plans in progress: yes  Barriers to discharge plan: none  Follow up plan completed       Entered by: Edison Sewell 06/23/2017 2:22 PM           Plan/Disposition: Home   Appointments:   Clinic will call      Care  (CTS) will continue to follow as needed.

## 2017-06-23 NOTE — DISCHARGE SUMMARY
PRIMARY CARE PROVIDER:  The patient has no established primary care provider.      CHIEF COMPLAINT:  Shortness of breath and chest pain.      DISCHARGE DIAGNOSES:   1.  Chest pain, etiology unclear.  Coronary angiogram revealed mild to moderate nonobstructive coronary artery disease.   2.  Hypertension.   3.  Mild dyslipidemia.   4.  Chronic obstructive pulmonary disease.   5.  Right upper lobe pulmonary nodule, measuring 5 mm.   6.  Chronic kidney disease stage II, baseline creatinine in the range of 1-1.2.   7.  Obesity with BMI of 33.9.     CONSULTANT:  Sofi Holbrook MD, [facility].      DIAGNOSTIC STUDIES:   1.  Chest x-ray showed no active cardiopulmonary disease.   2.  Transthoracic echocardiogram:  It revealed normal LV systolic function with EF of 60-65%, mild to moderate concentric LV hypertrophy, normal RV systolic function and no significant valvular disease.   3.  Coronary angiogram was achieved on 06/22/2017 and revealed mild to moderate nonocclusive coronary artery disease and normal LV end-diastolic pressure.  (There was no significant disease in the left main.  There was 20-25% distal LAD disease, 25% stenosis of the ostium of the first diagonal, 30-40% diffuse disease of mid to distal portion of the circumflex, and 20% stenosis of the midportion of the posterolateral branch of the RCA.      DISCHARGE MEDICATION:       Review of your medicines      START taking       Dose / Directions    aspirin 81 MG EC tablet   Used for:  Coronary artery disease involving native coronary artery of native heart without angina pectoris        Dose:  81 mg   Take 1 tablet (81 mg) by mouth daily   Quantity:  30 tablet   Refills:  3       atorvastatin 40 MG tablet   Commonly known as:  LIPITOR   Used for:  Coronary artery disease involving native coronary artery of native heart without angina pectoris        Dose:  40 mg   Take 1 tablet (40 mg) by mouth daily   Quantity:  30 tablet   Refills:  1        fluticasone-salmeterol 250-50 MCG/DOSE diskus inhaler   Commonly known as:  ADVAIR   Used for:  Other emphysema (H)        Dose:  1 puff   Inhale 1 puff into the lungs 2 times daily   Quantity:  1 Inhaler   Refills:  1       lisinopril 5 MG tablet   Commonly known as:  PRINIVIL/ZESTRIL   Used for:  Coronary artery disease involving native coronary artery of native heart without angina pectoris        Dose:  5 mg   Take 1 tablet (5 mg) by mouth daily   Quantity:  30 tablet   Refills:  1       tiotropium 18 MCG capsule   Commonly known as:  SPIRIVA HANDIHALER   Used for:  Other emphysema (H)        Inhale contents of one capsule daily.   Quantity:  30 capsule   Refills:  1         CONTINUE these medicines which may have CHANGED, or have new prescriptions. If we are uncertain of the size of tablets/capsules you have at home, strength may be listed as something that might have changed.       Dose / Directions    albuterol 108 (90 BASE) MCG/ACT Inhaler   Commonly known as:  PROAIR HFA/PROVENTIL HFA/VENTOLIN HFA   This may have changed:    - how much to take  - when to take this  - reasons to take this  - additional instructions   Used for:  Reactive airway disease with wheezing with acute exacerbation        Dose:  2 puff   Inhale 2 puffs into the lungs 4 times daily For 5 days, then use 2 puffs every 4 hours as needed   Quantity:  1 Inhaler   Refills:  0         CONTINUE these medicines which have NOT CHANGED       Dose / Directions    acetaminophen 500 MG tablet   Commonly known as:  TYLENOL   Used for:  Chest wall pain        Dose:  1000 mg   Take 2 tablets (1,000 mg) by mouth every 6 hours as needed for mild pain   Refills:  0       diphenhydrAMINE 50 MG capsule   Commonly known as:  BENADRYL   Used for:  Urticaria        Dose:  50 mg   Take 1 capsule (50 mg) by mouth every 6 hours as needed for itching   Quantity:  56 capsule   Refills:  0       fluticasone 50 MCG/ACT spray   Commonly known as:  FLONASE        Dose:   1 spray   Spray 1 spray into both nostrils daily   Refills:  0       ipratropium - albuterol 0.5 mg/2.5 mg/3 mL 0.5-2.5 (3) MG/3ML neb solution   Commonly known as:  DUONEB   Used for:  Other emphysema (H)        Dose:  3 mL   Take 1 vial (3 mLs) by nebulization every 4 hours as needed for shortness of breath / dyspnea or wheezing   Quantity:  75 mL   Refills:  0         STOP taking          sodium chloride 0.65 % nasal spray   Commonly known as:  OCEAN                Where to get your medicines      These medications were sent to [facility] [place], [place] - [address]   [address] [facility] [address]     Phone:  [phone]      aspirin 81 MG EC tablet     atorvastatin 40 MG tablet     fluticasone-salmeterol 250-50 MCG/DOSE diskus inhaler     ipratropium - albuterol 0.5 mg/2.5 mg/3 mL 0.5-2.5 (3) MG/3ML neb solution     lisinopril 5 MG tablet     tiotropium 18 MCG capsule              ALLERGIES AND INTOLERANCES:  Penicillins cause anaphylaxis.      DISPOSITION:  Home.      CONDITION ON DISCHARGE:  Stable.      DIET:  Low saturated fat/cholesterol.      ACTIVITY:  As tolerated.      FOLLOWUP PLAN:   1.  He is to follow up with a primary care physician in 2 weeks.  Recommended labs at that time, BMP.   2.  He is to follow up with Pulmonology Clinic in 2-4 weeks.    3.  CT chest in 12 months for followup on lung nodule.      SIGNIFICANT LABORATORY DATA AT DISCHARGE:  Sodium 140, potassium 4.2, creatinine 1.18, glucose 94, AST 24, ALT 40, white count 16.3, hemoglobin 14.4, TSH 0.46.     PENDING TET RESULTS:  None.     HISTORY OF PRESENT ILLNESS:  Mr. Eliseo Davis is a delightful, 56-year-old  gentleman with a past medical history notable for obesity with a BMI of 33.9, and tobacco abuse who was recently hospitalized at [facility] from 06/11 through 06/13/2017 for acute reactive airway disease and possible COPD exacerbation.  He presented to the Emergency Department with  increasing shortness of breath and episode of left-sided band-like chest pain radiating to his left shoulder.  He reported no associated nausea or vomiting.      In the Emergency Department, troponin I was less than 0.015.  Electrocardiogram revealed normal sinus rhythm with nonspecific T-wave abnormalities in inferior leads.  D-dimer was 0.3.  The chest x-ray showed no acute cardiopulmonary disease.  The BNP was 46.  He was initially admitted to the hospital under observation status.      For detailed history and physical examination, please refer to the dictated admission note by Dr. Ernie Padgett on 06/22/2017.      HOSPITAL COURSE:   1.  Chest pain:  The etiology remained unclear.  He presented with left-sided chest pain and normal troponin I of less than 0.015.  However, his troponin I peaked at 0.508, which did raise the possibility of non-ST elevation myocardial infarction.  He was therefore started on aspirin, beta blocker, ACE inhibitor, statin and IV heparin.  Cardiology consult was obtained and he underwent coronary angiogram on 06/22 which showed mild to moderate nonocclusive coronary artery disease.  In retrospect, the etiology of his chest pain was determined to be noncardiac.  Additionally, he had D-dimer of 0.3 and recent CT scan of the chest on 06/11/2017 which was negative for PE.  A fasting lipid profile showed total cholesterol of 192, LDL of 107, HDL of 75 and triglycerides of 52.  Given the findings on coronary angiogram, he was discharged on low-dose aspirin, as well as atorvastatin 40 mg p.o. daily.   2.  Hypertension:  During this hospitalization, he was noted to be hypertensive.  Initially, he was started on beta blocker and ACE inhibitor.  Given the findings on coronary angiogram, metoprolol was discontinued and he was discharged on oral lisinopril 5 mg p.o. daily.   3.  COPD:  The patient did not carry a working diagnosis of COPD; however, he had a 40-pack-year history of smoking  (quit in 2017) and on exam breath sounds were diffusely diminished. He had recent hospitalization at [facility] from  through  for what appeared to be acute COPD exacerbation.  The chest x-ray showed no active cardiopulmonary disease.  Procalcitonin was 0.05 and blood culture did not grow any organisms.  I had extensive discussion with the patient and instructed him to follow up with a primary care physician, as well as lung specialist, after discharge and have PFT arranged as an outpatient to determine the severity of his underlying COPD.  I also prescribed Advair and Spiriva and instructed him to continue with his neb treatments p.r.n. prescribed in his recent hospitalization.   4.  Right upper lobe pulmonary nodule:  It was an incidental finding noted on CT scan of the chest on  measuring 5 mm.  Given his longstanding history of smoking, followup CT in 12 months is recommended.  This was discussed with the patient.      CODE STATUS:  FULL.      TOTAL DISCHARGE TIME:  Approximately 40 minutes.         YAZMIN CARRASCO MD             D: 2017 12:27   T: 2017 13:50   MT: MICHELLE      Name:     MINO LEY   MRN:      [id number]        Account:        [id number]   :      1960           Admit Date:                                       Discharge Date:       Document: [id number]

## 2017-06-26 ENCOUNTER — TELEPHONE (OUTPATIENT)
Dept: CARDIOLOGY | Facility: CLINIC | Age: 57
End: 2017-06-26

## 2017-06-26 NOTE — TELEPHONE ENCOUNTER
Called patient to discuss any post hospital d/c questions he may have, review medication changes, and confirm f/u appts. [facility] and has [facility] working on his medications ie Advair and Spiriva and follow up appointments. Spiriva is very expensive and he is unable to afford them and the [facility] is working [facility] for [facility].  Patient complains of SOB, and has to sleep at the kitchen table due to increase short of breath.  RN confirmed with patient that he needs [facility] or [place] [facility] in [place].  Patient advised to call clinic with any cardiac related questions or concerns prior to [facility].. Patient verbalized understanding and agreed with plan.

## 2017-06-27 LAB
BACTERIA SPEC CULT: NORMAL
BACTERIA SPEC CULT: NORMAL
MICRO REPORT STATUS: NORMAL
MICRO REPORT STATUS: NORMAL
SPECIMEN SOURCE: NORMAL
SPECIMEN SOURCE: NORMAL

## 2017-06-28 LAB
INTERPRETATION ECG - MUSE: NORMAL
INTERPRETATION ECG - MUSE: NORMAL

## 2017-11-29 ENCOUNTER — TELEPHONE (OUTPATIENT)
Dept: CARDIOLOGY | Facility: CLINIC | Age: 57
End: 2017-11-29

## 2017-11-29 NOTE — TELEPHONE ENCOUNTER
Patient walked in the [facility] and was asking to talk to the discharge Coordinator who he talked to in June 2017.  He has questions about paper work and records.   recommended he talk to the HIM dept. He request a phone call from Coordinator.  Patient does not have a regular Cardiologist at [facility].  Will forward to Care Coordinator for follow up phone call.

## 2017-11-29 NOTE — TELEPHONE ENCOUNTER
RN called patient to review his questions. Patient reported he did not have question about paperwork, but was wondering what he was called for back in January from our office. RN reviewed with patient that that call was to arrange cardiology f/u at our office, but patient reported [facility]. Patient did confirm for RN that [facility] and [facility] with since d/[facility] in 06/2017. RN inquired if patient had any other questions today and patient denied this, but appreciated the follow up call.

## 2018-04-04 NOTE — PLAN OF CARE
"Problem: Goal Outcome Summary  Goal: Goal Outcome Summary  OT eval and treatment completed. Pt tolerated 8min amb on floor citing \"wheeziness\" in chest, notable SOB. /76, HR 75, 02 98% on RA. Pt educated in COPD dx, S/S mgmt, HEP, and OP Pulmonary Rehab. Referral info to schedule appt given and also issued handouts on all educational topics for reinforcement. Pt reported that he lives in mobile home with his brother and that there is a lot of mold in various areas. Following discussion with him and sister, sister came up with plan for family to assist in removing mold. Pt preparing for dc, therefore inpatient rehab discharged.  Discharge Planner OT   Patient plan for discharge: home w/ OP Pulmonary Rehab  Current status: indep mob, ADL, continues to have chest tightness with exertion due to coughing/wheezing (COPD dx)  Barriers to return to prior living situation: mold in mobile home  Recommendations for discharge: OP Pulmonary Rehab, continue HEP as instructed, remove mold from mobile home  Rationale for recommendations: continued impaired activity tolerance, needs further rehab       Entered by: Robert Junior 06/23/2017 4:10 PM         "
Problem: Goal Outcome Summary  Goal: Goal Outcome Summary  OT/CR: Orders rec'd. Noted increasing trops. Holding eval, pending plan from Cardiology.    Addendum: noted plans for angio. Holding eval until after.  
Problem: Goal Outcome Summary  Goal: Goal Outcome Summary  Outcome: Improving  A&O. VSS. CMS intact. Denies Pain, SOB, dizzyness. R Groin site CDI, soft, nontender, no signs of hematoma. Tele SR. Up SBA. Possible Pulmonology consult, no orders at this time.       
Problem: Goal Outcome Summary  Goal: Goal Outcome Summary  Outcome: Improving  Transfer from [facility] arrived on unit at 2220.A/o x4. VSS. Chest/Heartburn discomfort, given Mylanta. IV SL. LS diminished. Received two nebs on [facility]. Angiogram today. Dressing CDI. Cardiac Diet. Independent. D/c Pending      
Problem: Goal Outcome Summary  Goal: Goal Outcome Summary  Outcome: No Change  VSS. No c/o pain. Nitro on at 0.07mcg/kg/min. Hep @ 1200 u/hr. Recheck hep xa pending. Trops increasing. Last trop 0.533. Pt requesting to wear oxymask, O2 sats in the 90's before application. Placed on 3L for comfort. Tele SR/ST. Will continue to monitor.      
[id number]

## 2018-06-11 ENCOUNTER — HOSPITAL ENCOUNTER (EMERGENCY)
Facility: CLINIC | Age: 58
Discharge: HOME | End: 2018-06-11
Attending: EMERGENCY MEDICINE | Admitting: EMERGENCY MEDICINE
Payer: OTHER MISCELLANEOUS

## 2018-06-11 VITALS — SYSTOLIC BLOOD PRESSURE: 142 MMHG | OXYGEN SATURATION: 93 % | DIASTOLIC BLOOD PRESSURE: 94 MMHG | TEMPERATURE: 96.5 F

## 2018-06-11 DIAGNOSIS — S01.01XA LACERATION OF SCALP, INITIAL ENCOUNTER: ICD-10-CM

## 2018-06-11 PROCEDURE — 99283 EMERGENCY DEPT VISIT LOW MDM: CPT | Mod: 25 | Performed by: EMERGENCY MEDICINE

## 2018-06-11 PROCEDURE — 99282 EMERGENCY DEPT VISIT SF MDM: CPT | Mod: 25 | Performed by: EMERGENCY MEDICINE

## 2018-06-11 PROCEDURE — 12035 INTMD RPR S/A/T/EXT 12.6-20: CPT | Mod: Z6 | Performed by: EMERGENCY MEDICINE

## 2018-06-11 PROCEDURE — 25000125 ZZHC RX 250: Performed by: EMERGENCY MEDICINE

## 2018-06-11 PROCEDURE — 12035 INTMD RPR S/A/T/EXT 12.6-20: CPT | Performed by: EMERGENCY MEDICINE

## 2018-06-11 RX ORDER — LIDOCAINE HYDROCHLORIDE AND EPINEPHRINE 10; 10 MG/ML; UG/ML
15 INJECTION, SOLUTION INFILTRATION; PERINEURAL ONCE
Status: COMPLETED | OUTPATIENT
Start: 2018-06-11 | End: 2018-06-11

## 2018-06-11 RX ADMIN — LIDOCAINE HYDROCHLORIDE AND EPINEPHRINE 15 ML: 10; 10 INJECTION, SOLUTION INFILTRATION; PERINEURAL at 09:19

## 2018-06-11 NOTE — ED AVS SNAPSHOT
[facility] Emergency Department    [address] [place] [address]    Phone:  [phone]    Fax:  [phone]                                       Eliseo Davis   MRN: [id number]    Department:  [facility] Emergency Department   Date of Visit:  6/11/2018           After Visit Summary Signature Page     I have received my discharge instructions, and my questions have been answered. I have discussed any challenges I see with this plan with the nurse or doctor.    ..........................................................................................................................................  Patient/Patient Representative Signature      ..........................................................................................................................................  Patient Representative Print Name and Relationship to Patient    ..................................................               ................................................  Date                                            Time    ..........................................................................................................................................  Reviewed by Signature/Title    ...................................................              ..............................................  Date                                                            Time

## 2018-06-11 NOTE — ED PROVIDER NOTES
History     Chief Complaint   Patient presents with     Head Injury     HPI  Eliseo Davis is a 57 year old male who presents with a head injury.  He works on large fertilizer equipment for crops.  He was walking under this when he sliced his scalp on a sharp piece of aluminum he states.  He had no blunt injury to the head.  There is been no loss of consciousness or vomiting.  Scalp laceration bled considerably.  Last tetanus shot was at the [facility] last year he is sure peer    Problem List:    Patient Active Problem List    Diagnosis Date Noted     ACS (acute coronary syndrome) (H) 06/22/2017     Priority: Medium     Urticaria 06/13/2017     Priority: Medium     Neutrophilic leukocytosis 06/12/2017     Priority: Medium     Pulmonary nodule - 5 mm RUL (high risk pt) 06/12/2017     Priority: Medium     Acute respiratory failure with hypoxia (H) 06/11/2017     Priority: Medium     Reactive airway disease with wheezing with acute exacerbation 06/11/2017     Priority: Medium     Obesity 06/11/2017     Priority: Medium     CARDIOVASCULAR SCREENING; LDL GOAL LESS THAN 130 09/25/2013     Priority: Medium     Thoracic back pain 09/25/2013     Priority: Medium     Back muscle spasm 09/25/2013     Priority: Medium        Past Medical History:    Past Medical History:   Diagnosis Date     CARDIOVASCULAR SCREENING; LDL GOAL LESS THAN 130 9/25/2013     Uncomplicated asthma        Past Surgical History:    History reviewed. No pertinent surgical history.    Family History:    No family history on file.    Social History:  Marital Status:  Single [1]  Social History   Substance Use Topics     Smoking status: Former Smoker     Packs/day: 1.00     Years: 35.00     Types: Cigarettes     Smokeless tobacco: Former User     Quit date: 1/5/2017     Alcohol use No      Comment: 2 drinks yesterday        Medications:      albuterol (PROAIR HFA/PROVENTIL HFA/VENTOLIN HFA) 108 (90 BASE) MCG/ACT Inhaler   aspirin EC 81 MG EC tablet    atorvastatin (LIPITOR) 40 MG tablet   ipratropium - albuterol 0.5 mg/2.5 mg/3 mL (DUONEB) 0.5-2.5 (3) MG/3ML neb solution   lisinopril (PRINIVIL/ZESTRIL) 5 MG tablet   acetaminophen (TYLENOL) 500 MG tablet   diphenhydrAMINE (BENADRYL) 50 MG capsule   fluticasone (FLONASE) 50 MCG/ACT spray         Review of Systems  All other systems are reviewed and are negative    Physical Exam   BP: (!) 149/96  Heart Rate: 102  Temp: 96.5  F (35.8  C)  SpO2: 95 %      Physical Exam   Constitutional: He appears well-developed and well-nourished. No distress.   HENT:   Head: Normocephalic. Head is with laceration.   Mouth/Throat: Oropharynx is clear and moist.   Large full-thickness flap scalp laceration a total of 15 cm in length.  Within the flap there is particles of grass and hair.  The skull is without crepitus or deformity.   Eyes: Conjunctivae are normal. Right eye exhibits no discharge. Left eye exhibits no discharge. No scleral icterus.   Neck: Normal range of motion. Neck supple.   Cardiovascular: Normal rate, regular rhythm and normal heart sounds.    No murmur heard.  Pulmonary/Chest: Effort normal and breath sounds normal. No stridor. No respiratory distress.   Abdominal: Soft. There is no tenderness.   Musculoskeletal: Normal range of motion.   Neurological: He is alert. No cranial nerve deficit. He exhibits normal muscle tone.   Skin: Skin is warm and dry. No rash noted. He is not diaphoretic. No erythema. No pallor.   Psychiatric: He has a normal mood and affect.   Nursing note and vitals reviewed.      ED Course     ED Course     Laceration repair  Date/Time: 6/11/2018 10:35 AM  Performed by: ASHLEY KENNEDY  Authorized by: ASHLEY KENNEDY   Body area: head/neck  Location details: scalp  Laceration length: 15 cm  Foreign body present: grass and hair.  Tendon involvement: none  Nerve involvement: none  Anesthesia: local infiltration    Anesthesia:  Local Anesthetic: lidocaine 1% with epinephrine  Anesthetic  total: 15 mL  Preparation: Patient was prepped and draped in the usual sterile fashion.  Irrigation solution: saline  Irrigation method: syringe  Amount of cleaning: standard  Skin closure: staples (21)  Technique: simple  Approximation: close  Approximation difficulty: complex  Dressing: antibiotic ointment  Patient tolerance: Patient tolerated the procedure well with no immediate complications                   Critical Care time:  none               No results found for this or any previous visit (from the past 24 hour(s)).    Medications   lidocaine 1% with EPINEPHrine 1:100,000 injection 15 mL (15 mLs Intradermal Given by Other 6/11/18 0929)       Assessments & Plan (with Medical Decision Making)  57-year-old male with large full-thickness scalp laceration.  All visual foreign bodies were removed.  Tetanus is up-to-date per patient.  This was approximated with 21 staples.   follow-up for staple removal in 8-12 days.  Return sooner if any signs of infection or other concern.     I have reviewed the nursing notes.    I have reviewed the findings, diagnosis, plan and need for follow up with the patient.       Discharge Medication List as of 6/11/2018  9:15 AM          Final diagnoses:   Laceration of scalp, initial encounter       6/11/2018   [facility] EMERGENCY DEPARTMENT     Joesph Killian MD  06/11/18 1038

## 2018-06-11 NOTE — ED AVS SNAPSHOT
[facility] Emergency Department    [address] [place] [address]    Phone:  [phone]    Fax:  [phone]                                       Eliseo Davis   MRN: [id number]    Department:  [facility] Emergency Department   Date of Visit:  6/11/2018           Patient Information     Date Of Birth          1960        Your diagnoses for this visit were:     Laceration of scalp, initial encounter        You were seen by Joesph Killian MD.      Follow-up Information     Follow up with [facility].    Specialty:  Family Practice    Why:  For staple removal in 8-12 days    Contact information:    [address] [place] [id number] [phone]    Additional information:    Clinic is located at [address] [place] [address]      Discharge References/Attachments     LACERATION, SCALP: SUTURES OR STAPLES (ENGLISH)      24 Hour Appointment Hotline       To make an appointment at any [facility], call [phone] ([phone]). If you don't have a family doctor or clinic, we will help you find one. [facility] are conveniently located to serve the needs of you and your family.             Review of your medicines      Our records show that you are taking the medicines listed below. If these are incorrect, please call your family doctor or clinic.        Dose / Directions Last dose taken    acetaminophen 500 MG tablet   Commonly known as:  TYLENOL   Dose:  1000 mg        Take 2 tablets (1,000 mg) by mouth every 6 hours as needed for mild pain   Refills:  0        albuterol 108 (90 Base) MCG/ACT Inhaler   Commonly known as:  PROAIR HFA/PROVENTIL HFA/VENTOLIN HFA   Dose:  2 puff   Quantity:  1 Inhaler        Inhale 2 puffs into the lungs 4 times daily For 5 days, then use 2 puffs every 4 hours as needed   Refills:  0        aspirin 81 MG EC tablet   Dose:  81 mg   Quantity:  30 tablet        Take 1 tablet (81 mg) by mouth daily   Refills:  3         atorvastatin 40 MG tablet   Commonly known as:  LIPITOR   Dose:  40 mg   Quantity:  30 tablet        Take 1 tablet (40 mg) by mouth daily   Refills:  1        diphenhydrAMINE 50 MG capsule   Commonly known as:  BENADRYL   Dose:  50 mg   Quantity:  56 capsule        Take 1 capsule (50 mg) by mouth every 6 hours as needed for itching   Refills:  0        fluticasone 50 MCG/ACT spray   Commonly known as:  FLONASE   Dose:  1 spray        Spray 1 spray into both nostrils daily   Refills:  0        ipratropium - albuterol 0.5 mg/2.5 mg/3 mL 0.5-2.5 (3) MG/3ML neb solution   Commonly known as:  DUONEB   Dose:  3 mL   Quantity:  75 mL        Take 1 vial (3 mLs) by nebulization every 4 hours as needed for shortness of breath / dyspnea or wheezing   Refills:  0        lisinopril 5 MG tablet   Commonly known as:  PRINIVIL/ZESTRIL   Dose:  5 mg   Quantity:  30 tablet        Take 1 tablet (5 mg) by mouth daily   Refills:  1                Orders Needing Specimen Collection     None      Pending Results     No orders found from 6/9/2018 to 6/12/2018.            Pending Culture Results     No orders found from 6/9/2018 to 6/12/2018.            Pending Results Instructions     If you had any lab results that were not finalized at the time of your Discharge, you can call the ED Lab Result RN at [phone]. You will be contacted by this team for any positive Lab results or changes in treatment. The nurses are available 7 days a week from 10A to 6:30P.  You can leave a message 24 hours per day and they will return your call.        Thank you for choosing [facility]       Thank you for choosing [facility] for your care. Our goal is always to provide you with excellent care. Hearing back from our patients is one way we can continue to improve our services. Please take a few minutes to complete the written survey that you may receive in the mail after you visit with us. Thank you!        [employer] Information     [employer] lets you send  "messages to your doctor, view your test results, renew your prescriptions, schedule appointments and more. To sign up, go to [email or web address] . Click on \"Log in\" on the left side of the screen, which will take you to the Welcome page. Then click on \"Sign up Now\" on the right side of the page.     You will be asked to enter the access code listed below, as well as some personal information. Please follow the directions to create your username and password.     Your access code is: [email or web address]  Expires: 2018  8:10 AM     Your access code will  in 90 days. If you need help or a new code, please call your [place] clinic or [phone].        Care EveryWhere ID     This is your Care EveryWhere ID. This could be used by other organizations to access your [place] medical records  [id number]        Equal Access to Services     RODRI FRYE : Hadii preston Jaramillo, waaxda lunura, qaybta kaalmada adenabeel, ban roldan . So [facility] [phone].    ATENCIÓN: Si habla español, tiene a guzmán disposición servicios gratuitos de asistencia lingüística. Llame al [phone].    We comply with applicable federal civil rights laws and [place] laws. We do not discriminate on the basis of race, color, national origin, age, disability, sex, sexual orientation, or gender identity.            After Visit Summary       This is your record. Keep this with you and show to your community pharmacist(s) and doctor(s) at your next visit.                  "

## 2018-06-19 ENCOUNTER — OFFICE VISIT (OUTPATIENT)
Dept: FAMILY MEDICINE | Facility: OTHER | Age: 58
End: 2018-06-19
Payer: OTHER MISCELLANEOUS

## 2018-06-19 VITALS
BODY MASS INDEX: 35.81 KG/M2 | HEART RATE: 72 BPM | DIASTOLIC BLOOD PRESSURE: 80 MMHG | HEIGHT: 69 IN | RESPIRATION RATE: 20 BRPM | SYSTOLIC BLOOD PRESSURE: 128 MMHG | OXYGEN SATURATION: 97 % | TEMPERATURE: 95.9 F | WEIGHT: 241.8 LBS

## 2018-06-19 DIAGNOSIS — S01.01XD LACERATION OF SCALP, SUBSEQUENT ENCOUNTER: Primary | ICD-10-CM

## 2018-06-19 PROCEDURE — 99213 OFFICE O/P EST LOW 20 MIN: CPT | Performed by: PHYSICIAN ASSISTANT

## 2018-06-19 ASSESSMENT — PAIN SCALES - GENERAL: PAINLEVEL: NO PAIN (0)

## 2018-06-19 NOTE — PROGRESS NOTES
SUBJECTIVE:   Eliseo Davis is a 57 year old male who presents to clinic today for the following health issues:      Concern - work comp staple removal from his head  Onset: 6-    Description:   Staples removed    Intensity: 0/10    Progression of Symptoms:  same    Accompanying Signs & Symptoms:  none    Previous history of similar problem:   no    Precipitating factors:   Worsened by: nothing    Alleviating factors:  Improved by: Staple    Therapies Tried and outcome: Staples    Patient is a pleasant 57 year old male who is in for staple removal. He was seen at the [facility] ED on 06/11 after striking his head on a piece of sheet metal. 21 staples placed at that time and he was instructed to use topical ointment. He denies headache, dizziness, trouble concentrating, difficulty reading/watching television, fever, drainage or increased pain. Has been working since the incident and plans to return to work today.     Problem list and histories reviewed & adjusted, as indicated.  Additional history: as documented    Patient Active Problem List   Diagnosis     CARDIOVASCULAR SCREENING; LDL GOAL LESS THAN 130     Thoracic back pain     Back muscle spasm     Acute respiratory failure with hypoxia (H)     Reactive airway disease with wheezing with acute exacerbation     Obesity     Neutrophilic leukocytosis     Pulmonary nodule - 5 mm RUL (high risk pt)     Urticaria     ACS (acute coronary syndrome) (H)     History reviewed. No pertinent surgical history.    Social History   Substance Use Topics     Smoking status: Former Smoker     Packs/day: 1.00     Years: 35.00     Types: Cigarettes     Smokeless tobacco: Former User     Quit date: 1/5/2017     Alcohol use No      Comment: 2 drinks yesterday     History reviewed. No pertinent family history.      Current Outpatient Prescriptions   Medication Sig Dispense Refill     albuterol (PROAIR HFA/PROVENTIL HFA/VENTOLIN HFA) 108 (90 BASE) MCG/ACT Inhaler Inhale 2 puffs  "into the lungs 4 times daily For 5 days, then use 2 puffs every 4 hours as needed (Patient taking differently: Inhale 1-2 puffs into the lungs every 4 hours as needed For 5 days, then use 2 puffs every 4 hours as needed) 1 Inhaler 0     aspirin EC 81 MG EC tablet Take 1 tablet (81 mg) by mouth daily 30 tablet 3     atorvastatin (LIPITOR) 40 MG tablet Take 1 tablet (40 mg) by mouth daily 30 tablet 1     ipratropium - albuterol 0.5 mg/2.5 mg/3 mL (DUONEB) 0.5-2.5 (3) MG/3ML neb solution Take 1 vial (3 mLs) by nebulization every 4 hours as needed for shortness of breath / dyspnea or wheezing 75 mL 0     lisinopril (PRINIVIL/ZESTRIL) 5 MG tablet Take 1 tablet (5 mg) by mouth daily 30 tablet 1     acetaminophen (TYLENOL) 500 MG tablet Take 2 tablets (1,000 mg) by mouth every 6 hours as needed for mild pain (Patient not taking: Reported on 6/19/2018)       diphenhydrAMINE (BENADRYL) 50 MG capsule Take 1 capsule (50 mg) by mouth every 6 hours as needed for itching (Patient not taking: Reported on 6/19/2018) 56 capsule      fluticasone (FLONASE) 50 MCG/ACT spray Spray 1 spray into both nostrils daily       Allergies   Allergen Reactions     Penicillins Anaphylaxis     Dust Mites      Pollen Extract        Reviewed and updated as needed this visit by clinical staff  Tobacco  Allergies  Meds  Med Hx  Surg Hx  Fam Hx  Soc Hx      Reviewed and updated as needed this visit by Provider         ROS:  Constitutional, HEENT, cardiovascular, pulmonary, integumentary systems are negative, except as otherwise noted.    OBJECTIVE:     /80 (BP Location: Left arm, Patient Position: Chair, Cuff Size: Adult Large)  Pulse 72  Temp 95.9  F (35.5  C) (Oral)  Resp 20  Ht 5' 8.75\" (1.746 m)  Wt 241 lb 12.8 oz (109.7 kg)  SpO2 97%  BMI 35.97 kg/m2  Body mass index is 35.97 kg/(m^2).  GENERAL: healthy, alert and no distress  MS: no gross musculoskeletal defects noted, no edema  SKIN: no suspicious lesions or rashes and large " 15cm crusted crescent shaped laceration without sign of obvious drainage, erythema or edema.      Diagnostic Test Results:  none     ASSESSMENT/PLAN:     1. Laceration of scalp, subsequent encounter  Examination of scalp unremarkable for signs of drainage, erythema, edema or increased tenderness. 21 staples removed in total without complication. Patient will return to work without restriction. He will call clinic if not improving as expected.     Follow up with clinic as needed or sooner if conditions change, worsen or fail to improve as expected.      Jaime Douglass PA-C  [facility]

## 2018-06-19 NOTE — LETTER
"REPORT OF WORK COMP    [facility]  [address] [facility] [id number] [phone]      PATIENT DATA    Employee Name: Eliseo Davis      : 1960     #: [id number]    Work related injury: Yes  Employer at time of injury: [employer]  Employer contact & phone: [employer] phone number unknown  Employed elsewhere? No  Workers' Compensation Carrier/Managed Care Plan:  unknown    Today's date: 2018  Date of injury: 2018  Date of first visit: 18    PROVIDER EVALUATION: Please fill in as needed.  Please give copy to employee for employer.    1. Diagnosis: ***    2. Treatment: ***.  3. Medication: ***  NOTE: When ordering a medication, [place] Rules require Work Comp or WC on prescriptions.    4. No work from *** to ***.  5. Return to work date: ***   ** WITH RESTRICTIONS? {YES/NO Work Restrictions:378138}      RESTRICTIONS: Unlimited unless listed.  Restrictions apply to home and leisure also.  If work restrictions is not available, the employee is totally disabled.    Maximum Medical Improvement (Date): ***  Any Permanent Partial Disability? {PERCENT PPD:244221::\"0%\"}    Provider comments: ***    Medical Examiner: ***          License or registration: ***    Next appointment: {TIME FRAME/DATE/WEEKS/ASNEEDED:669397}    CC: Employer, Managed Care Plan/Payor, Patient  "

## 2018-06-19 NOTE — NURSING NOTE
Health Maintenance Due   Topic Date Due     TETANUS IMMUNIZATION (SYSTEM ASSIGNED)  09/11/1979     HIV SCREEN (SYSTEM ASSIGNED)  09/11/1979     HEPATITIS C SCREENING  09/11/1979     FIT Q1 YR  05/01/2016     ADVANCE DIRECTIVE PLANNING Q5 YRS  09/11/2016     Leah RUFFIN LPN      The patient stated he had a Tetanus immunization at the [facility] in [place].

## 2018-06-19 NOTE — MR AVS SNAPSHOT
"              After Visit Summary   2018    Eliseo Davis    MRN: [id number]           Patient Information     Date Of Birth          1960        Visit Information        Provider Department      2018 10:00 AM Jaime Douglass PA-C [facility]        Today's Diagnoses     Laceration of scalp, subsequent encounter    -  1       Follow-ups after your visit        Who to contact     If you have questions or need follow up information about today's clinic visit or your schedule please contact [facility] directly at [phone].  Normal or non-critical lab and imaging results will be communicated to you by [employer], letter or phone within 4 business days after the clinic has received the results. If you do not hear from us within 7 days, please contact the clinic through [employer] or phone. If you have a critical or abnormal lab result, we will notify you by phone as soon as possible.  Submit refill requests through [email or web address] or call your pharmacy and they will forward the refill request to us. Please allow 3 business days for your refill to be completed.          Additional Information About Your Visit        MyChart Information     [email or web address] lets you send messages to your doctor, view your test results, renew your prescriptions, schedule appointments and more. To sign up, go to [email or web address] . Click on \"Log in\" on the left side of the screen, which will take you to the Welcome page. Then click on \"Sign up Now\" on the right side of the page.     You will be asked to enter the access code listed below, as well as some personal information. Please follow the directions to create your username and password.     Your access code is: [email or web address]  Expires: 2018  8:10 AM     Your access code will  in 90 days. If you need help or a new code, please call your [facility] or [phone].        Care EveryWhere ID     This is your Care EveryWhere ID. This could be used by " "other organizations to access your [place] medical records  [id number]        Your Vitals Were     Pulse Temperature Respirations Height Pulse Oximetry BMI (Body Mass Index)    72 95.9  F (35.5  C) (Oral) 20 5' 8.75\" (1.746 m) 97% 35.97 kg/m2       Blood Pressure from Last 3 Encounters:   06/19/18 128/80   06/11/18 (!) 142/94   06/23/17 114/67    Weight from Last 3 Encounters:   06/19/18 241 lb 12.8 oz (109.7 kg)   06/22/17 229 lb 8 oz (104.1 kg)   06/21/17 240 lb (108.9 kg)              Today, you had the following     No orders found for display         Today's Medication Changes          These changes are accurate as of 6/19/18 10:33 AM.  If you have any questions, ask your nurse or doctor.               These medicines have changed or have updated prescriptions.        Dose/Directions    albuterol 108 (90 Base) MCG/ACT Inhaler   Commonly known as:  PROAIR HFA/PROVENTIL HFA/VENTOLIN HFA   This may have changed:    - how much to take  - when to take this  - reasons to take this  - additional instructions   Used for:  Reactive airway disease with wheezing with acute exacerbation        Dose:  2 puff   Inhale 2 puffs into the lungs 4 times daily For 5 days, then use 2 puffs every 4 hours as needed   Quantity:  1 Inhaler   Refills:  0                Primary Care Provider Fax #    [facility] [phone]       [facility] [address]        Equal Access to Services     RODRI FRYE : Mariel Jaramillo, wadanielada lunura, qaybta kaalmada soni, wax everette snyder. So [facility] [phone].    ATENCIÓN: Si habla español, tiene a guzmán disposición servicios gratuitos de asistencia lingüística. Llame al [phone].    We comply with applicable federal civil rights laws and [place] laws. We do not discriminate on the basis of race, color, national origin, age, disability, sex, sexual orientation, or gender identity.            Thank you!     Thank you for " choosing [facility]  for your care. Our goal is always to provide you with excellent care. Hearing back from our patients is one way we can continue to improve our services. Please take a few minutes to complete the written survey that you may receive in the mail after your visit with us. Thank you!             Your Updated Medication List - Protect others around you: Learn how to safely use, store and throw away your medicines at www.disposemymeds.org.          This list is accurate as of 6/19/18 10:33 AM.  Always use your most recent med list.                   Brand Name Dispense Instructions for use Diagnosis    acetaminophen 500 MG tablet    TYLENOL     Take 2 tablets (1,000 mg) by mouth every 6 hours as needed for mild pain    Chest wall pain       albuterol 108 (90 Base) MCG/ACT Inhaler    PROAIR HFA/PROVENTIL HFA/VENTOLIN HFA    1 Inhaler    Inhale 2 puffs into the lungs 4 times daily For 5 days, then use 2 puffs every 4 hours as needed    Reactive airway disease with wheezing with acute exacerbation       aspirin 81 MG EC tablet     30 tablet    Take 1 tablet (81 mg) by mouth daily    Coronary artery disease involving native coronary artery of native heart without angina pectoris       atorvastatin 40 MG tablet    LIPITOR    30 tablet    Take 1 tablet (40 mg) by mouth daily    Coronary artery disease involving native coronary artery of native heart without angina pectoris       diphenhydrAMINE 50 MG capsule    BENADRYL    56 capsule    Take 1 capsule (50 mg) by mouth every 6 hours as needed for itching    Urticaria       fluticasone 50 MCG/ACT spray    FLONASE     Spray 1 spray into both nostrils daily        ipratropium - albuterol 0.5 mg/2.5 mg/3 mL 0.5-2.5 (3) MG/3ML neb solution    DUONEB    75 mL    Take 1 vial (3 mLs) by nebulization every 4 hours as needed for shortness of breath / dyspnea or wheezing    Other emphysema (H)       lisinopril 5 MG tablet    PRINIVIL/ZESTRIL    30 tablet     Take 1 tablet (5 mg) by mouth daily    Coronary artery disease involving native coronary artery of native heart without angina pectoris

## 2018-08-14 ENCOUNTER — TELEPHONE (OUTPATIENT)
Dept: FAMILY MEDICINE | Facility: OTHER | Age: 58
End: 2018-08-14

## 2018-08-14 NOTE — TELEPHONE ENCOUNTER
Patient returns call,  States he had a colonoscopy two years ago, and [facility].  He will complete an NICOL and have records sent here.

## 2018-08-14 NOTE — TELEPHONE ENCOUNTER
Panel Management Review      Patient has the following on his problem list:       Composite cancer screening  Chart review shows that this patient is due/due soon for the following   Summary:    Patient is due/failing the following:   FIT    Action needed:   Patient needs non-fasting lab only appointment    Type of outreach:    Phone, left message for patient to call back.     Questions for provider review:    None                                                                                                                                    Leah RUFFIN LPN       Chart routed to Leah RUFFIN LPN   .

## 2019-01-28 ENCOUNTER — TRANSFERRED RECORDS (OUTPATIENT)
Dept: HEALTH INFORMATION MANAGEMENT | Facility: CLINIC | Age: 59
End: 2019-01-28

## 2019-01-28 LAB
ALT SERPL-CCNC: 46 U/L (ref 0–55)
AST SERPL-CCNC: 32 U/L (ref 5–40)
CHOLEST SERPL-MCNC: 134 MG/DL
CREAT SERPL-MCNC: 1.1 MG/DL (ref 0.5–1.5)
GFR SERPL CREATININE-BSD FRML MDRD: >60 ML/MIN
GLUCOSE SERPL-MCNC: 105 MG/DL (ref 70–100)
HDLC SERPL-MCNC: 46 MG/DL
LDLC SERPL CALC-MCNC: 75 MG/DL
POTASSIUM SERPL-SCNC: 4.4 MMOL/L (ref 3.5–5)
TRIGL SERPL-MCNC: 67 MG/DL

## 2019-02-16 ENCOUNTER — APPOINTMENT (OUTPATIENT)
Dept: CT IMAGING | Facility: CLINIC | Age: 59
End: 2019-02-16
Attending: FAMILY MEDICINE
Payer: COMMERCIAL

## 2019-02-16 ENCOUNTER — APPOINTMENT (OUTPATIENT)
Dept: ULTRASOUND IMAGING | Facility: CLINIC | Age: 59
End: 2019-02-16
Attending: FAMILY MEDICINE
Payer: COMMERCIAL

## 2019-02-16 ENCOUNTER — HOSPITAL ENCOUNTER (OUTPATIENT)
Facility: CLINIC | Age: 59
Setting detail: OBSERVATION
Discharge: HOME | End: 2019-02-16
Attending: FAMILY MEDICINE | Admitting: FAMILY MEDICINE
Payer: COMMERCIAL

## 2019-02-16 VITALS
RESPIRATION RATE: 18 BRPM | OXYGEN SATURATION: 93 % | BODY MASS INDEX: 36.4 KG/M2 | DIASTOLIC BLOOD PRESSURE: 75 MMHG | SYSTOLIC BLOOD PRESSURE: 125 MMHG | WEIGHT: 244.71 LBS | HEART RATE: 106 BPM | TEMPERATURE: 98.4 F

## 2019-02-16 DIAGNOSIS — R10.11 ABDOMINAL PAIN, RIGHT UPPER QUADRANT: ICD-10-CM

## 2019-02-16 DIAGNOSIS — I25.10 CORONARY ARTERY DISEASE INVOLVING NATIVE CORONARY ARTERY OF NATIVE HEART WITHOUT ANGINA PECTORIS: ICD-10-CM

## 2019-02-16 DIAGNOSIS — R07.9 CHEST PAIN, UNSPECIFIED TYPE: ICD-10-CM

## 2019-02-16 DIAGNOSIS — R10.811 RIGHT UPPER QUADRANT ABDOMINAL TENDERNESS, REBOUND TENDERNESS PRESENCE NOT SPECIFIED: ICD-10-CM

## 2019-02-16 DIAGNOSIS — R07.9 ACUTE CHEST PAIN: ICD-10-CM

## 2019-02-16 DIAGNOSIS — R07.89 ATYPICAL CHEST PAIN: ICD-10-CM

## 2019-02-16 DIAGNOSIS — J45.901 REACTIVE AIRWAY DISEASE WITH WHEEZING WITH ACUTE EXACERBATION, UNSPECIFIED ASTHMA SEVERITY, UNSPECIFIED WHETHER PERSISTENT: ICD-10-CM

## 2019-02-16 DIAGNOSIS — K21.9 GASTROESOPHAGEAL REFLUX DISEASE, ESOPHAGITIS PRESENCE NOT SPECIFIED: Primary | ICD-10-CM

## 2019-02-16 PROBLEM — R00.0 TACHYCARDIA: Status: ACTIVE | Noted: 2019-02-16

## 2019-02-16 PROBLEM — J44.9 COPD (CHRONIC OBSTRUCTIVE PULMONARY DISEASE) (H): Status: ACTIVE | Noted: 2019-02-16

## 2019-02-16 PROBLEM — E78.5 HYPERLIPIDEMIA LDL GOAL <100: Status: ACTIVE | Noted: 2019-02-16

## 2019-02-16 LAB
ALBUMIN SERPL-MCNC: 3.6 G/DL (ref 3.4–5)
ALP SERPL-CCNC: 111 U/L (ref 40–150)
ALT SERPL W P-5'-P-CCNC: 42 U/L (ref 0–70)
ANION GAP SERPL CALCULATED.3IONS-SCNC: 9 MMOL/L (ref 3–14)
AST SERPL W P-5'-P-CCNC: 24 U/L (ref 0–45)
BASOPHILS # BLD AUTO: 0.1 10E9/L (ref 0–0.2)
BASOPHILS NFR BLD AUTO: 0.9 %
BILIRUB SERPL-MCNC: 0.4 MG/DL (ref 0.2–1.3)
BUN SERPL-MCNC: 17 MG/DL (ref 7–30)
CALCIUM SERPL-MCNC: 8.7 MG/DL (ref 8.5–10.1)
CHLORIDE SERPL-SCNC: 105 MMOL/L (ref 94–109)
CO2 SERPL-SCNC: 26 MMOL/L (ref 20–32)
CREAT SERPL-MCNC: 1.29 MG/DL (ref 0.66–1.25)
D DIMER PPP FEU-MCNC: 0.3 UG/ML FEU (ref 0–0.5)
DIFFERENTIAL METHOD BLD: ABNORMAL
EOSINOPHIL NFR BLD AUTO: 2.3 %
ERYTHROCYTE [DISTWIDTH] IN BLOOD BY AUTOMATED COUNT: 13.4 % (ref 10–15)
GFR SERPL CREATININE-BSD FRML MDRD: 61 ML/MIN/{1.73_M2}
GLUCOSE SERPL-MCNC: 119 MG/DL (ref 70–99)
HCT VFR BLD AUTO: 42.8 % (ref 40–53)
HGB BLD-MCNC: 14.3 G/DL (ref 13.3–17.7)
IMM GRANULOCYTES # BLD: 0 10E9/L (ref 0–0.4)
IMM GRANULOCYTES NFR BLD: 0.2 %
LACTATE BLD-SCNC: 1.6 MMOL/L (ref 0.7–2)
LACTATE BLD-SCNC: 2.2 MMOL/L (ref 0.7–2)
LIPASE SERPL-CCNC: 198 U/L (ref 73–393)
LYMPHOCYTES # BLD AUTO: 5.1 10E9/L (ref 0.8–5.3)
LYMPHOCYTES NFR BLD AUTO: 41.5 %
MCH RBC QN AUTO: 31 PG (ref 26.5–33)
MCHC RBC AUTO-ENTMCNC: 33.4 G/DL (ref 31.5–36.5)
MCV RBC AUTO: 93 FL (ref 78–100)
MONOCYTES # BLD AUTO: 1.1 10E9/L (ref 0–1.3)
MONOCYTES NFR BLD AUTO: 8.8 %
NEUTROPHILS # BLD AUTO: 5.7 10E9/L (ref 1.6–8.3)
NEUTROPHILS NFR BLD AUTO: 46.3 %
NRBC # BLD AUTO: 0 10*3/UL
NRBC BLD AUTO-RTO: 0 /100
PLATELET # BLD AUTO: 378 10E9/L (ref 150–450)
POTASSIUM SERPL-SCNC: 3.4 MMOL/L (ref 3.4–5.3)
PROT SERPL-MCNC: 7.3 G/DL (ref 6.8–8.8)
RBC # BLD AUTO: 4.62 10E12/L (ref 4.4–5.9)
SODIUM SERPL-SCNC: 140 MMOL/L (ref 133–144)
TROPONIN I SERPL-MCNC: <0.015 UG/L (ref 0–0.04)
WBC # BLD AUTO: 12.4 10E9/L (ref 4–11)

## 2019-02-16 PROCEDURE — 71260 CT THORAX DX C+: CPT

## 2019-02-16 PROCEDURE — 36415 COLL VENOUS BLD VENIPUNCTURE: CPT | Performed by: NURSE PRACTITIONER

## 2019-02-16 PROCEDURE — 25000132 ZZH RX MED GY IP 250 OP 250 PS 637: Performed by: FAMILY MEDICINE

## 2019-02-16 PROCEDURE — 25000128 H RX IP 250 OP 636: Performed by: FAMILY MEDICINE

## 2019-02-16 PROCEDURE — 99285 EMERGENCY DEPT VISIT HI MDM: CPT | Mod: 25 | Performed by: FAMILY MEDICINE

## 2019-02-16 PROCEDURE — 96374 THER/PROPH/DIAG INJ IV PUSH: CPT | Mod: 59 | Performed by: FAMILY MEDICINE

## 2019-02-16 PROCEDURE — 36415 COLL VENOUS BLD VENIPUNCTURE: CPT | Performed by: FAMILY MEDICINE

## 2019-02-16 PROCEDURE — 99207 ZZC CDG-CODE CATEGORY CHANGED: CPT | Performed by: FAMILY MEDICINE

## 2019-02-16 PROCEDURE — 93010 ELECTROCARDIOGRAM REPORT: CPT | Mod: Z6 | Performed by: FAMILY MEDICINE

## 2019-02-16 PROCEDURE — 93005 ELECTROCARDIOGRAM TRACING: CPT | Performed by: FAMILY MEDICINE

## 2019-02-16 PROCEDURE — 96376 TX/PRO/DX INJ SAME DRUG ADON: CPT | Mod: 59 | Performed by: FAMILY MEDICINE

## 2019-02-16 PROCEDURE — 93010 ELECTROCARDIOGRAM REPORT: CPT | Mod: 76 | Performed by: FAMILY MEDICINE

## 2019-02-16 PROCEDURE — 96375 TX/PRO/DX INJ NEW DRUG ADDON: CPT | Mod: 59

## 2019-02-16 PROCEDURE — 93005 ELECTROCARDIOGRAM TRACING: CPT | Mod: 76 | Performed by: FAMILY MEDICINE

## 2019-02-16 PROCEDURE — 80053 COMPREHEN METABOLIC PANEL: CPT | Performed by: FAMILY MEDICINE

## 2019-02-16 PROCEDURE — 84484 ASSAY OF TROPONIN QUANT: CPT | Mod: 91 | Performed by: FAMILY MEDICINE

## 2019-02-16 PROCEDURE — 96375 TX/PRO/DX INJ NEW DRUG ADDON: CPT | Performed by: FAMILY MEDICINE

## 2019-02-16 PROCEDURE — G0378 HOSPITAL OBSERVATION PER HR: HCPCS

## 2019-02-16 PROCEDURE — 76705 ECHO EXAM OF ABDOMEN: CPT

## 2019-02-16 PROCEDURE — 25000132 ZZH RX MED GY IP 250 OP 250 PS 637: Performed by: NURSE PRACTITIONER

## 2019-02-16 PROCEDURE — 83690 ASSAY OF LIPASE: CPT | Performed by: FAMILY MEDICINE

## 2019-02-16 PROCEDURE — 25000128 H RX IP 250 OP 636: Performed by: NURSE PRACTITIONER

## 2019-02-16 PROCEDURE — 99207 ZZC APP CREDIT; MD BILLING SHARED VISIT: CPT | Performed by: NURSE PRACTITIONER

## 2019-02-16 PROCEDURE — 99235 HOSP IP/OBS SAME DATE MOD 70: CPT | Mod: AI | Performed by: FAMILY MEDICINE

## 2019-02-16 PROCEDURE — 25000125 ZZHC RX 250: Performed by: FAMILY MEDICINE

## 2019-02-16 PROCEDURE — 85379 FIBRIN DEGRADATION QUANT: CPT | Performed by: FAMILY MEDICINE

## 2019-02-16 PROCEDURE — 85025 COMPLETE CBC W/AUTO DIFF WBC: CPT | Performed by: FAMILY MEDICINE

## 2019-02-16 PROCEDURE — 83605 ASSAY OF LACTIC ACID: CPT | Mod: 91 | Performed by: NURSE PRACTITIONER

## 2019-02-16 PROCEDURE — 74177 CT ABD & PELVIS W/CONTRAST: CPT

## 2019-02-16 PROCEDURE — 83605 ASSAY OF LACTIC ACID: CPT | Performed by: FAMILY MEDICINE

## 2019-02-16 RX ORDER — ASPIRIN 81 MG/1
324 TABLET, CHEWABLE ORAL ONCE
Status: COMPLETED | OUTPATIENT
Start: 2019-02-16 | End: 2019-02-16

## 2019-02-16 RX ORDER — ONDANSETRON 2 MG/ML
4 INJECTION INTRAMUSCULAR; INTRAVENOUS EVERY 30 MIN PRN
Status: COMPLETED | OUTPATIENT
Start: 2019-02-16 | End: 2019-02-16

## 2019-02-16 RX ORDER — NALOXONE HYDROCHLORIDE 0.4 MG/ML
.1-.4 INJECTION, SOLUTION INTRAMUSCULAR; INTRAVENOUS; SUBCUTANEOUS
Status: DISCONTINUED | OUTPATIENT
Start: 2019-02-16 | End: 2019-02-16 | Stop reason: HOSPADM

## 2019-02-16 RX ORDER — FENTANYL CITRATE 50 UG/ML
100 INJECTION, SOLUTION INTRAMUSCULAR; INTRAVENOUS ONCE
Status: COMPLETED | OUTPATIENT
Start: 2019-02-16 | End: 2019-02-16

## 2019-02-16 RX ORDER — ACETAMINOPHEN 500 MG
1000 TABLET ORAL EVERY 6 HOURS PRN
Status: DISCONTINUED | OUTPATIENT
Start: 2019-02-16 | End: 2019-02-16 | Stop reason: HOSPADM

## 2019-02-16 RX ORDER — LISINOPRIL 2.5 MG/1
5 TABLET ORAL DAILY
Status: DISCONTINUED | OUTPATIENT
Start: 2019-02-16 | End: 2019-02-16 | Stop reason: HOSPADM

## 2019-02-16 RX ORDER — NITROGLYCERIN 0.4 MG/1
0.4 TABLET SUBLINGUAL
Status: COMPLETED | OUTPATIENT
Start: 2019-02-16 | End: 2019-02-16

## 2019-02-16 RX ORDER — NITROGLYCERIN 20 MG/100ML
.07-1.8 INJECTION INTRAVENOUS CONTINUOUS
Status: DISCONTINUED | OUTPATIENT
Start: 2019-02-16 | End: 2019-02-16

## 2019-02-16 RX ORDER — ASPIRIN 81 MG/1
81 TABLET ORAL DAILY
Status: DISCONTINUED | OUTPATIENT
Start: 2019-02-16 | End: 2019-02-16 | Stop reason: HOSPADM

## 2019-02-16 RX ORDER — ALUMINA, MAGNESIA, AND SIMETHICONE 2400; 2400; 240 MG/30ML; MG/30ML; MG/30ML
30 SUSPENSION ORAL EVERY 4 HOURS PRN
Status: DISCONTINUED | OUTPATIENT
Start: 2019-02-16 | End: 2019-02-16

## 2019-02-16 RX ORDER — FLUTICASONE PROPIONATE 50 MCG
1 SPRAY, SUSPENSION (ML) NASAL DAILY
Qty: 1 BOTTLE | Refills: 1 | Status: SHIPPED | OUTPATIENT
Start: 2019-02-16 | End: 2019-11-21

## 2019-02-16 RX ORDER — MORPHINE SULFATE 2 MG/ML
1-2 INJECTION, SOLUTION INTRAMUSCULAR; INTRAVENOUS EVERY 4 HOURS PRN
Status: DISCONTINUED | OUTPATIENT
Start: 2019-02-16 | End: 2019-02-16

## 2019-02-16 RX ORDER — IOPAMIDOL 755 MG/ML
500 INJECTION, SOLUTION INTRAVASCULAR ONCE
Status: COMPLETED | OUTPATIENT
Start: 2019-02-16 | End: 2019-02-16

## 2019-02-16 RX ORDER — GABAPENTIN 100 MG/1
100 CAPSULE ORAL 2 TIMES DAILY
Status: DISCONTINUED | OUTPATIENT
Start: 2019-02-16 | End: 2019-02-16

## 2019-02-16 RX ORDER — FENTANYL CITRATE 50 UG/ML
INJECTION, SOLUTION INTRAMUSCULAR; INTRAVENOUS
Status: DISCONTINUED
Start: 2019-02-16 | End: 2019-02-16 | Stop reason: HOSPADM

## 2019-02-16 RX ORDER — FENTANYL CITRATE 50 UG/ML
50 INJECTION, SOLUTION INTRAMUSCULAR; INTRAVENOUS ONCE
Status: COMPLETED | OUTPATIENT
Start: 2019-02-16 | End: 2019-02-16

## 2019-02-16 RX ORDER — ALUMINA, MAGNESIA, AND SIMETHICONE 2400; 2400; 240 MG/30ML; MG/30ML; MG/30ML
30 SUSPENSION ORAL ONCE
Status: COMPLETED | OUTPATIENT
Start: 2019-02-16 | End: 2019-02-16

## 2019-02-16 RX ORDER — FLUTICASONE PROPIONATE 50 MCG
1 SPRAY, SUSPENSION (ML) NASAL DAILY
Status: DISCONTINUED | OUTPATIENT
Start: 2019-02-16 | End: 2019-02-16 | Stop reason: HOSPADM

## 2019-02-16 RX ORDER — ATORVASTATIN CALCIUM 40 MG/1
40 TABLET, FILM COATED ORAL DAILY
Qty: 30 TABLET | Refills: 0 | Status: SHIPPED | OUTPATIENT
Start: 2019-02-16 | End: 2019-11-21

## 2019-02-16 RX ORDER — NITROGLYCERIN 0.4 MG/1
0.4 TABLET SUBLINGUAL EVERY 5 MIN PRN
Status: DISCONTINUED | OUTPATIENT
Start: 2019-02-16 | End: 2019-02-16 | Stop reason: HOSPADM

## 2019-02-16 RX ORDER — DIPHENHYDRAMINE HYDROCHLORIDE AND LIDOCAINE HYDROCHLORIDE AND ALUMINUM HYDROXIDE AND MAGNESIUM HYDRO
10 KIT EVERY 6 HOURS PRN
Qty: 240 ML | Refills: 1 | Status: SHIPPED | OUTPATIENT
Start: 2019-02-16 | End: 2019-07-31

## 2019-02-16 RX ORDER — LIDOCAINE 40 MG/G
CREAM TOPICAL
Status: DISCONTINUED | OUTPATIENT
Start: 2019-02-16 | End: 2019-02-16 | Stop reason: HOSPADM

## 2019-02-16 RX ORDER — LIDOCAINE 4 G/G
1 PATCH TOPICAL
Status: DISCONTINUED | OUTPATIENT
Start: 2019-02-16 | End: 2019-02-16

## 2019-02-16 RX ORDER — IPRATROPIUM BROMIDE AND ALBUTEROL SULFATE 2.5; .5 MG/3ML; MG/3ML
3 SOLUTION RESPIRATORY (INHALATION) EVERY 4 HOURS PRN
Status: DISCONTINUED | OUTPATIENT
Start: 2019-02-16 | End: 2019-02-16 | Stop reason: HOSPADM

## 2019-02-16 RX ORDER — OMEPRAZOLE 40 MG/1
40 CAPSULE, DELAYED RELEASE ORAL
Qty: 60 CAPSULE | Refills: 0 | Status: SHIPPED | OUTPATIENT
Start: 2019-02-16 | End: 2019-04-10

## 2019-02-16 RX ORDER — LISINOPRIL 5 MG/1
5 TABLET ORAL DAILY
Qty: 30 TABLET | Refills: 0 | Status: SHIPPED | OUTPATIENT
Start: 2019-02-16 | End: 2019-11-21

## 2019-02-16 RX ORDER — DIPHENHYDRAMINE HYDROCHLORIDE AND LIDOCAINE HYDROCHLORIDE AND ALUMINUM HYDROXIDE AND MAGNESIUM HYDRO
10 KIT EVERY 6 HOURS PRN
Status: DISCONTINUED | OUTPATIENT
Start: 2019-02-16 | End: 2019-02-16 | Stop reason: HOSPADM

## 2019-02-16 RX ADMIN — ONDANSETRON 4 MG: 2 INJECTION INTRAMUSCULAR; INTRAVENOUS at 01:02

## 2019-02-16 RX ADMIN — ALUMINUM HYDROXIDE, MAGNESIUM HYDROXIDE, AND DIMETHICONE 30 ML: 400; 400; 40 SUSPENSION ORAL at 02:42

## 2019-02-16 RX ADMIN — IOPAMIDOL 100 ML: 755 INJECTION, SOLUTION INTRAVENOUS at 01:15

## 2019-02-16 RX ADMIN — ASPIRIN 81 MG: 81 TABLET, COATED ORAL at 08:18

## 2019-02-16 RX ADMIN — RANITIDINE 150 MG: 150 TABLET ORAL at 10:55

## 2019-02-16 RX ADMIN — ONDANSETRON 4 MG: 2 INJECTION INTRAMUSCULAR; INTRAVENOUS at 02:26

## 2019-02-16 RX ADMIN — Medication 1 SPRAY: at 13:39

## 2019-02-16 RX ADMIN — DIPHENHYDRAMINE HYDROCHLORIDE AND LIDOCAINE HYDROCHLORIDE AND ALUMINUM HYDROXIDE AND MAGNESIUM HYDRO 10 ML: KIT at 14:28

## 2019-02-16 RX ADMIN — FENTANYL CITRATE 100 MCG: 50 INJECTION, SOLUTION INTRAMUSCULAR; INTRAVENOUS at 02:26

## 2019-02-16 RX ADMIN — SODIUM CHLORIDE 80 ML: 9 INJECTION, SOLUTION INTRAVENOUS at 01:16

## 2019-02-16 RX ADMIN — OMEPRAZOLE 40 MG: 20 CAPSULE, DELAYED RELEASE ORAL at 16:23

## 2019-02-16 RX ADMIN — LISINOPRIL 5 MG: 2.5 TABLET ORAL at 08:19

## 2019-02-16 RX ADMIN — FENTANYL CITRATE 50 MCG: 50 INJECTION, SOLUTION INTRAMUSCULAR; INTRAVENOUS at 00:50

## 2019-02-16 RX ADMIN — ONDANSETRON 4 MG: 2 INJECTION INTRAMUSCULAR; INTRAVENOUS at 04:12

## 2019-02-16 RX ADMIN — SODIUM CHLORIDE 1000 ML: 9 INJECTION, SOLUTION INTRAVENOUS at 13:32

## 2019-02-16 RX ADMIN — OMEPRAZOLE 20 MG: 20 CAPSULE, DELAYED RELEASE ORAL at 10:55

## 2019-02-16 RX ADMIN — FENTANYL CITRATE 100 MCG: 50 INJECTION, SOLUTION INTRAMUSCULAR; INTRAVENOUS at 04:27

## 2019-02-16 RX ADMIN — NITROGLYCERIN 0.4 MG: 0.4 TABLET SUBLINGUAL at 03:59

## 2019-02-16 RX ADMIN — LIDOCAINE HYDROCHLORIDE 30 ML: 20 SOLUTION ORAL; TOPICAL at 06:47

## 2019-02-16 RX ADMIN — MORPHINE SULFATE 2 MG: 2 INJECTION, SOLUTION INTRAMUSCULAR; INTRAVENOUS at 10:08

## 2019-02-16 RX ADMIN — ASPIRIN 81 MG 324 MG: 81 TABLET ORAL at 01:02

## 2019-02-16 RX ADMIN — ALUMINUM HYDROXIDE, MAGNESIUM HYDROXIDE, AND DIMETHICONE 30 ML: 400; 400; 40 SUSPENSION ORAL at 06:35

## 2019-02-16 RX ADMIN — FENTANYL CITRATE 50 MCG: 50 INJECTION, SOLUTION INTRAMUSCULAR; INTRAVENOUS at 01:10

## 2019-02-16 NOTE — PROGRESS NOTES
[facility]    Sepsis Evaluation Progress Note    Date of Service: 02/16/2019    I was called to see Eliseo Davis due to abnormal vital signs triggering the Sepsis SIRS screening alert. He is not known to have an infection.     Physical Exam    Vital Signs:  Temp: 97.9  F (36.6  C) Temp src: Oral BP: 132/81 Pulse: 103 Heart Rate: 103 Resp: 18 SpO2: 94 % O2 Device: None (Room air) Oxygen Delivery: 2 LPM    Lab:  Lactic Acid   Date Value Ref Range Status   06/22/2017 1.7 0.7 - 2.1 mmol/L Final     Lactate for Sepsis Protocol   Date Value Ref Range Status   02/16/2019 2.2 (H) 0.7 - 2.0 mmol/L Final     Comment:     Significant value called to and read back by  EVERTON ANNE RN AT 1250 TA         The patient is at baseline mental status.    The rest of their physical exam is significant for chest pain, esophageal pain, burning, worse with laying down and at night. No fever, chill, shortness of breath. No urinary changes. Patient has not eaten or drank much in 24 hours due to esophageal/chest pain. Likely mild dehydration.     Assessment and Plan    The SIRS and exam findings are likely due to dehydration, there is no sign of sepsis at this time.    Disposition: The patient will remain on the current unit. We will continue to monitor this patient closely. Re check lactic acid in 2 hours.     Shaina Christensen, CNP

## 2019-02-16 NOTE — ED NOTES
ED Nursing criteria listed below was addressed during verbal handoff:     Abnormal vitals: Yes  Abnormal results: Yes  Med Reconciliation completed: Yes  Meds given in ED: Yes  Any Overdue Meds: N/A  Core Measures: Yes  Isolation: N/A  Special needs: N/A  Skin assessment: Yes    Observation Patient  Education provided: Yes, watched video.  No additional questions.     Report given to Jacque RN, pt to room 215.

## 2019-02-16 NOTE — PLAN OF CARE
S-(situation): shift note    B-(background): Chest pain/GERD    A-(assessment): Pt is A&O.  Having mid sternal pain.  States its more like heartburn than chest pain.  Has not had anything to eat yet today, now is trying pudding.  New meds started for acid reflux, has gotten some relief.  Flagged for lactic - bolus given. Up and walking in hallway.  Tolerated this well.    R-(recommendations): Will cont to monitor the above.

## 2019-02-16 NOTE — PROGRESS NOTES
S-(situation): Patient discharged to home via W/C with self    B-(background): Observation goals met     A-(assessment): Pt is A&O.  VSS.  Afebrile.  The medication started on pt has helped to reduce his discomfort. Has GERD and new meds ordered.      R-(recommendations): Discharge instructions reviewed with pt. Listed belongings gathered and returned to patient.  Patient Education resolved: Yes  New medications-Pt. Has been educated about reason of use and side effects Yes  Home and hospital acquired medications returned to patient Yes  Medication Bin checked and emptied on discharge Yes

## 2019-02-16 NOTE — ED NOTES
Blood pressure dropped after sublingual nitroglycerin.  Fluid bolus started.  Pt feeling nauseated.  Zofran administered.  Provider in room.  Verbal order to hold Fentanyl until BP stable.  Also to hold administration of IV nitroglycerin.

## 2019-02-16 NOTE — ED PROVIDER NOTES
History     Chief Complaint   Patient presents with     Chest Pain     HPI  Eliseo Davis is a 58 year old male who presents to the ED tonight with severe upper chest pain that radiates through to his back.  This woke him from sleep at about 11 PM.  He takes cholesterol medicine but is on nothing for hypertension.  If not diabetic.  Non-smoker.  States he had an angiogram a couple of years ago but they did not have to put any stents in.  He states this was done at [facility]. (was actually at [facility] after chart review)    He rates his pain 12/10.    ========== Angiogram at [facility] on 6/22/2017  ==========      Coronary findings     Coronary circulation is right dominant     LM: Left main is a large-caliber vessel which gives rise to the LAD  and circumflex respectively. There is no significant disease in the  left main.     LAD: Left anterior descending artery is a medium to large caliber  vessel which extends and wraps on apex. This vessel gives rise to  several septal perforators 1 major diagonal vessel in the mid segment  midportion the vessel also has a prominent myocardial bridge. The  vessel then courses towards the apex and has scattered diffuse disease  of 20-25%. Ostium of the first diagonal vessel has a 25% stenosis.     CFX: Circumflex is a medium size vessel which gives rise to 2 major  obtuse marginal branches in the mid segment. The mid to distal portion  of the circumflex on going into the OM 2 branch has a 30-40% diffuse  stenosis     RCA: Right coronary artery is a medium size dominant vessel. This  vessel gives rise to a conus branch proximally RB marginal branch in  the mid segment and then distally terminates into a medium size. A  medium-sized posterior lateral branch. Midportion the vessel has a 20%  diffuse stenosis.     Summary  1. Mild to moderate nonocclusive coronary artery disease  2. Normal left ventricular end-diastolic pressure     Recommendations   1.  Secondary prevention as appropriate  2. Bedrest 2 hours post Angio-Seal    =========================================================    Allergies:  Allergies   Allergen Reactions     Penicillins Anaphylaxis     Dust Mites      Pollen Extract        Problem List:    Patient Active Problem List    Diagnosis Date Noted     ACS (acute coronary syndrome) (H) 06/22/2017     Priority: Medium     Urticaria 06/13/2017     Priority: Medium     Neutrophilic leukocytosis 06/12/2017     Priority: Medium     Pulmonary nodule - 5 mm RUL (high risk pt) 06/12/2017     Priority: Medium     Acute respiratory failure with hypoxia (H) 06/11/2017     Priority: Medium     Reactive airway disease with wheezing with acute exacerbation 06/11/2017     Priority: Medium     Obesity 06/11/2017     Priority: Medium     CARDIOVASCULAR SCREENING; LDL GOAL LESS THAN 130 09/25/2013     Priority: Medium     Thoracic back pain 09/25/2013     Priority: Medium     Back muscle spasm 09/25/2013     Priority: Medium        Past Medical History:    Past Medical History:   Diagnosis Date     CARDIOVASCULAR SCREENING; LDL GOAL LESS THAN 130 9/25/2013     Uncomplicated asthma        Past Surgical History:    No past surgical history on file.    Family History:    No family history on file.    Social History:  Marital Status:  Single [1]  Social History     Tobacco Use     Smoking status: Former Smoker     Packs/day: 1.00     Years: 35.00     Pack years: 35.00     Types: Cigarettes     Smokeless tobacco: Former User     Quit date: 1/5/2017   Substance Use Topics     Alcohol use: No     Comment: 2 drinks yesterday     Drug use: Yes     Types: Marijuana     Comment: 2-3 tiomes a year        Medications:      albuterol (PROAIR HFA/PROVENTIL HFA/VENTOLIN HFA) 108 (90 BASE) MCG/ACT Inhaler   aspirin EC 81 MG EC tablet   atorvastatin (LIPITOR) 40 MG tablet   fluticasone (FLONASE) 50 MCG/ACT spray   ipratropium - albuterol 0.5 mg/2.5 mg/3 mL (DUONEB) 0.5-2.5 (3) MG/3ML  neb solution   lisinopril (PRINIVIL/ZESTRIL) 5 MG tablet   acetaminophen (TYLENOL) 500 MG tablet   diphenhydrAMINE (BENADRYL) 50 MG capsule         Review of Systems   Unable to perform ROS: Acuity of condition       Physical Exam   BP: 140/78  Pulse: 105  Heart Rate: 70  Resp: 24  Weight: 111 kg (244 lb 11.4 oz)  SpO2: 98 %      Physical Exam   Constitutional: He is oriented to person, place, and time. He appears well-developed and well-nourished. He appears distressed.   HENT:   Head: Normocephalic.   Mouth/Throat: Oropharynx is clear and moist.   Neck: Neck supple.   Cardiovascular: Normal rate and regular rhythm.   Pulmonary/Chest: Effort normal and breath sounds normal.   Abdominal: There is tenderness (RUQ/Epi).   Musculoskeletal: Normal range of motion. He exhibits no edema or tenderness.   Neurological: He is alert and oriented to person, place, and time.   Skin: He is diaphoretic. There is pallor ( slightly gray).   Psychiatric: He has a normal mood and affect.       ED Course  (with Medical Decision Making)    58-year-old gentleman woke over an hour ago with upper chest pain radiating through to his back.  His initial EKG shows no acute ischemic changes.  Sinus rhythm at 68 bpm.  Clinically however he is in marked distress pale and slightly gray in color.  CT of his chest was ordered to eval for aortic dissection.    Pain down to 5/10 after 50 mcg of fentanyl IV.  His color looks much better as well.  He is tender in his right upper quadrant.  I added a lipase to his labs.    CT showed no evidence of aortic aneurysm or dissection.  No large central PEs.  No abnormal findings.    Troponin was undetectable.  White count up slightly at 12.4.  LFTs and lipase were normal.  D-dimer was normal at 0.3.    I reviewed all these results with him.  We will plan on checking a second troponin at 3 AM, 4 hours after the onset of his pain.  The meantime, will get a right upper quadrant ultrasound to further evaluate his  gallbladder as he is tenderness right upper quadrant and his gallbladder although it was noted to be normal on CT, looks a bit generous.    RUQ ultrasound shows a small amount of sludge in the gallbladder but no stones.  No ductal dilatation or gallbladder wall thickening.  His second troponin was also undetectable.    To have more pain.  I was going to start him on some IV nitro but give him a sublingual nitro while getting the drip was set up.  He did not realize any pain relief from the sublingual nitro but then proceeded to drop his systolic blood pressure from 140 down to the 80s.  He they called his heart rate down recently into the 40s and then into the 50s.  Heart rate is now come back up into the 80s after a normal saline IV flushed and his systolic blood pressures up into the upper 90s.  We will hold off on the IV nitro.    Repeated his EKG and that still shows a sinus rhythm at 62 bpm.  No acute ischemic changes.    I would like to keep him on chest pain observation since he has risk factors and is not in a low risk category.  He should at least have serial troponins before considering stress testing.  He is feeling much better now after his pressure recovered.  Pain has also improved significantly.    I placed a call to Dr. Barros, the hospitalist on call. He has assumed his care and will write orders.               Procedures               EKG Interpretation: #1 @ 0041     Interpreted by Dhiraj Bryan  Time reviewed: 0042  Symptoms at time of EKG: chest pain   Rhythm: normal sinus   Rate: 68  Axis: Normal  Ectopy: none  Conduction: normal  ST Segments/ T Waves: No ST elevation or depression.  P waves are slightly peaked.  Q Waves: none  Comparison to prior:.   Compared to the  EKG from 6/21/2017, T waves are slightly more prominent in V4-6.  However his T wave is actually greater in amplitude in the R wave in V3 when it is the same size as the R wave tonight.  Overall if any significant  change.    Clinical Impression: Sinus rhythm at 68 bpm.  T waves are slightly peaked and a bit more prominent than previous EKG.  No acute ischemic changes but will watch closely for evolution.             EKG Interpretation: #2 @ 0141     Interpreted by Dhiraj Bryan  Time reviewed:1:42 AM    Symptoms at time of EKG: chest pain   Rhythm: Normal sinus   Rate: 66  Axis: Normal  Ectopy: None  Conduction: Normal  ST Segments/ T Waves: No ST-T wave changes and No acute ischemic changes  Q Waves: None  Comparison to prior: No significant change from the previous EKG earlier tonight.  T waves are actually a little bit less prominent and appear normal now.    Clinical Impression: normal EKG           EKG Interpretation: #3 @ 0421     Interpreted by Dhiraj Bryan  Time reviewed:0422   Symptoms at time of EKG: chest pain   Rhythm: normal sinus   Rate: 62  Axis: NORMAL  Ectopy: none  Conduction: normal  ST Segments/ T Waves: No ST-T wave changes  Q Waves: none  Comparison to prior: Unchanged from EKG #2 tonight.    Clinical Impression: Sinus rhythm at 62 bpm.  No acute ischemic changes.  Normal EKG.      Critical Care time:  none               Results for orders placed or performed during the hospital encounter of 02/16/19 (from the past 24 hour(s))   CBC with platelets differential   Result Value Ref Range    WBC 12.4 (H) 4.0 - 11.0 10e9/L    RBC Count 4.62 4.4 - 5.9 10e12/L    Hemoglobin 14.3 13.3 - 17.7 g/dL    Hematocrit 42.8 40.0 - 53.0 %    MCV 93 78 - 100 fl    MCH 31.0 26.5 - 33.0 pg    MCHC 33.4 31.5 - 36.5 g/dL    RDW 13.4 10.0 - 15.0 %    Platelet Count 378 150 - 450 10e9/L    Diff Method Automated Method     % Neutrophils 46.3 %    % Lymphocytes 41.5 %    % Monocytes 8.8 %    % Eosinophils 2.3 %    % Basophils 0.9 %    % Immature Granulocytes 0.2 %    Nucleated RBCs 0 0 /100    Absolute Neutrophil 5.7 1.6 - 8.3 10e9/L    Absolute Lymphocytes 5.1 0.8 - 5.3 10e9/L    Absolute Monocytes 1.1 0.0 -  1.3 10e9/L    Absolute Basophils 0.1 0.0 - 0.2 10e9/L    Abs Immature Granulocytes 0.0 0 - 0.4 10e9/L    Absolute Nucleated RBC 0.0    Troponin I   Result Value Ref Range    Troponin I ES <0.015 0.000 - 0.045 ug/L   Comprehensive metabolic panel   Result Value Ref Range    Sodium 140 133 - 144 mmol/L    Potassium 3.4 3.4 - 5.3 mmol/L    Chloride 105 94 - 109 mmol/L    Carbon Dioxide 26 20 - 32 mmol/L    Anion Gap 9 3 - 14 mmol/L    Glucose 119 (H) 70 - 99 mg/dL    Urea Nitrogen 17 7 - 30 mg/dL    Creatinine 1.29 (H) 0.66 - 1.25 mg/dL    GFR Estimate 61 >60 mL/min/[1.73_m2]    GFR Estimate If Black 71 >60 mL/min/[1.73_m2]    Calcium 8.7 8.5 - 10.1 mg/dL    Bilirubin Total 0.4 0.2 - 1.3 mg/dL    Albumin 3.6 3.4 - 5.0 g/dL    Protein Total 7.3 6.8 - 8.8 g/dL    Alkaline Phosphatase 111 40 - 150 U/L    ALT 42 0 - 70 U/L    AST 24 0 - 45 U/L   D dimer quantitative   Result Value Ref Range    D Dimer 0.3 0.0 - 0.50 ug/ml FEU   Lipase   Result Value Ref Range    Lipase 198 73 - 393 U/L   CT Aortic Survey w Contrast    Narrative    CT AORTIC SURVEY W CONTRAST  2/16/2019 1:36 AM      HISTORY: Severe chest pain radiating to back, rule out dissection.    TECHNIQUE: CT chest, abdomen and pelvis with intravenous contrast.  Radiation dose for this scan was reduced using automated exposure  control, adjustment of the mA and/or kV according to patient size, or  iterative reconstruction technique. 100 mL Isovue-370.    COMPARISON: None.    FINDINGS:  Chest:  The thoracic aorta is calcified and mildly tortuous. No aortic  aneurysm or dissection. No large central pulmonary embolus. The heart  size is normal. There are calcified lymph nodes in the right hilum and  mediastinum. No lymph node enlargement. There are a few bulla in the  lung apices. Dependent atelectasis bilaterally. 0.5 cm nodule along  the left major fissure. Mild scarring at the lung bases. No  pneumothorax or pleural effusion.    Abdomen: There is no abdominal aortic  aneurysm or dissection. The  liver, gallbladder, pancreas, adrenal glands and kidneys are normal in  appearance. There are calcified granulomas in the spleen. No abdominal  or pelvic lymph node enlargement.    Pelvis: There is no bowel obstruction or inflammation. No free  intraperitoneal gas or fluid. Degenerative disease in the spine. Old  left rib fractures.      Impression    IMPRESSION:  1. There is no aortic aneurysm or dissection.  2. Small indeterminate left lung nodule. Follow-up recommended.  3. No acute abdominal or pelvic abnormality.    Recommendations for one or multiple incidental lung nodules < 6mm :    Low risk patients: No routine follow-up.    High risk patients: Optional follow-up CT at twelve months; if  unchanged, no further follow-up.    *Low Risk: Minimal or absent history of smoking or other known risk  factors.  *Nonsolid (ground-glass) or partly solid nodules may require longer  follow-up to exclude indolent adenocarcinoma.  *Recommendations based on Guidelines for the Management of Incidental  Pulmonary Nodules Detected at CT: From the Fleischner Society 2017,  Radiology 2017.   Troponin I   Result Value Ref Range    Troponin I ES <0.015 0.000 - 0.045 ug/L   Abdomen US, limited (RUQ only)    Narrative    US ABDOMEN LIMITED  2/16/2019 3:29 AM      HISTORY: Right upper quadrant abdominal and chest pain.    COMPARISON: None.    FINDINGS: The liver is normal in size and texture without focal mass.  There is no intra or extrahepatic biliary dilatation. The common  hepatic duct measures 0.4 cm. There is sludge in the gallbladder. The  gallbladder otherwise appears normal. The pancreas head and body  appear normal. The tail is obscured by bowel gas. The right kidney  measures 11.1 cm and is normal in appearance. The proximal abdominal  aorta and IVC appear normal.       Impression    IMPRESSION: There is a small amount of sludge in the gallbladder. No  gallstones or biliary dilatation.        Medications   fentaNYL (PF) (SUBLIMAZE) 100 MCG/2ML injection (  Hold 2/16/19 0113)   nitroGLYcerin 50 mg in D5W 250 mL (adult std) infusion (0 mcg/kg/min × 111 kg (Dosing Weight) Intravenous Hold 2/16/19 0424)   aspirin (ASA) chewable tablet 324 mg (324 mg Oral Given 2/16/19 0102)   ondansetron (ZOFRAN) injection 4 mg (4 mg Intravenous Given 2/16/19 0412)   fentaNYL (PF) (SUBLIMAZE) injection 50 mcg (50 mcg Intravenous Given 2/16/19 0050)   sodium chloride 0.9 % bag 500mL for CT scan flush use (80 mLs Intravenous Given 2/16/19 0116)   iopamidol (ISOVUE-370) solution 500 mL (100 mLs Intravenous Given 2/16/19 0115)   sodium chloride (PF) 0.9% PF flush 3 mL (3 mLs Intracatheter Given 2/16/19 0114)   fentaNYL (PF) (SUBLIMAZE) injection 50 mcg (50 mcg Intravenous Given 2/16/19 0110)   fentaNYL (PF) (SUBLIMAZE) injection 100 mcg (100 mcg Intravenous Given 2/16/19 0226)   alum & mag hydroxide-simethicone (MYLANTA ES/MAALOX  ES) suspension 30 mL (30 mLs Oral Given 2/16/19 0242)   nitroGLYcerin (NITROSTAT) sublingual tablet 0.4 mg (0.4 mg Sublingual Given 2/16/19 0359)   fentaNYL (PF) (SUBLIMAZE) injection 100 mcg (100 mcg Intravenous Given 2/16/19 0427)       Assessments & Plan      I have reviewed the nursing notes.    I have reviewed the findings, diagnosis, plan and need for follow up with the patient.       HEART Score  Background  Calculates the overall risk of adverse event in patient's presenting with chest pain.  Based on 5 criteria (each assigned 0-2 points) including suspiciousness of history, EKG, age, risk factors and troponin.    Data  58 year old male  has CARDIOVASCULAR SCREENING; LDL GOAL LESS THAN 130; Thoracic back pain; Back muscle spasm; Acute respiratory failure with hypoxia (H); Reactive airway disease with wheezing with acute exacerbation; Obesity; Neutrophilic leukocytosis; Pulmonary nodule - 5 mm RUL (high risk pt); Urticaria; and ACS (acute coronary syndrome) (H) on their problem list.    reports that he has quit smoking. His smoking use included cigarettes. He has a 35.00 pack-year smoking history. He quit smokeless tobacco use about 2 years ago.  family history is not on file.  Lab Results   Component Value Date    TROPI <0.015 02/16/2019     Criteria   0-2 points for each of 5 items (maximum of 10 points):  Score 2- History highly suspicious for coronary syndrome  Score 0- EKG Normal  Score 1- Age 45 to 65 years old  Score 1- One to 2 risk factors for atherosclerotic disease  Score 0- Within normal limits for troponin levels  Interpretation  Risk of adverse outcome  Heart Score: 4  Total Score 4-6- Adverse Outcome Risk 20.3% - Supports admission with standard rule-out management -serial troponins and stress testing        ED to Inpatient Handoff:    Discussed with Dr Barros at 4:41 AM   Patient accepted for Observation Stay  Pending studies include none  Code Status: Not Addressed              Medication List      There are no discharge medications for this visit.         Final diagnoses:   Acute chest pain   Right upper quadrant abdominal tenderness, rebound tenderness presence not specified       2/16/2019   [facility] EMERGENCY DEPARTMENT     Dhiraj Bryan MD  02/16/19 0446

## 2019-02-16 NOTE — ASSESSMENT & PLAN NOTE
Presenting with chest pain radiating to the back, acutely occurring at bedtime associate with nausea and shortness of breath  Risk factors with previous smoking history of obesity, age and hyperlipidemia on medications  Had angiogram in June 2017 showing mild diffuse disease.  Initial troponin studies x2 are negative, EKG with no acute changes  [place] observation with serial troponins x2 more.  If negative, anticipate discharge home with outpatient stress testing

## 2019-02-16 NOTE — ED TRIAGE NOTES
Pt presents with concerns of chest pain that started at 2300.  The pain woke him from sleep.  Pt complaining of shortness of breath, initially had nausea.

## 2019-02-16 NOTE — DISCHARGE SUMMARY
"[facility]  Hospitalist Discharge Summary       Date of Admission:  2/16/2019  Date of Discharge:  2/16/2019  Discharging Provider: Shaina Christensen CNP      Discharge Diagnoses   Principal Problem:    Atypical chest pain  Active Problems:    Gastroesophageal reflux disease    COPD (chronic obstructive pulmonary disease) (H)    Hyperlipidemia LDL goal <100    Tachycardia    Follow-ups Needed After Discharge   Follow-up Appointments     Follow-up and recommended labs and tests       Follow up with primary care provider, [facility], within 7 days for hospital follow- up.  No follow up labs or test are needed.    Follow up with Surgeon , in [place],  for hospital follow- up and possible EGD.             Hospital Course      #Chest pain   Eliseo Davis is a 58 year old male admitted on 2/16/2019. He presents with acute onset of chest pain last evening at bedtime, approximately 11:00 PM.  Radiated to his back, associated with shortness of breath and nausea.  On arrival to the ED EKG showed some mild T wave changes with initial troponin studies x2 being negative.  CT imaging is showing no acute changes and currently he is pain-free.  He has history of similar pain in June 2017 which resulted in transfer to [facility] with angiogram showing diffuse disease, no intervention done.  Patient was registered to observation and monitored with telemetry with serial troponin's X 4 negative. Patient remained asymptomatic and outpatient stress testing ordered.     #GERD  Today patient was explaining that he has a long history of acid reflux, he takes \" lots of Rolaids\" and has been on Zantac at home. Patient states [facility] but [facility] was not [facility]. Patient chest pain is worse when laying flat and at night. He states the pain is not worse right after eating. He describes that pain as burning and points directly to his esophagus. No " abdominal pain. Negative an's sign. Magic mouth wash and a GI cocktail did give him complete relief but the pain returned in 30 minutes. Patient will discharge with a prescription for Prilosec and Magic Mouth Wash.  Patient is in agreement for a referral to general surgery for EGD.     COPD (chronic obstructive pulmonary disease) (H)  No current symptoms, controlled at home taking PRN duo nebs, albuterol MDI with daily use of Symbicort.     Hyperlipidemia LDL goal <100  Controlled at home taking Lipitor, [facility]    Tachycardia/ Triggered for Lacic  Patient presented with tachycardia, bradycardia noted in the ER with nitroglycerin gtt. His heart rate returned to normal for him at a rate of . He did trigger for a lactic acid draw today with a level of 2.2, his physical exam is significant for atypical chest pain, esophageal pain, burning, worse with laying down and at night. No fever, chill, shortness of breath. No urinary changes. Patient has not eaten or drank much in 24 hours due to esophageal/chest pain. Likely mild dehydration. Patient was given a fluid bolus and his HR was in the 90's with a repeat lactic at 1.6.     Consultations This Hospital Stay   None    Code Status   Full Code    Time Spent on this Encounter   I, Shaina Christensen, personally saw the patient today and spent greater than 30 minutes discharging this patient.       Shaina Christensen, [facility]  ______________________________________________________________________    Physical Exam   Vital Signs: Temp: 97.9  F (36.6  C) Temp src: Oral BP: 132/89 Pulse: 105 Heart Rate: 105 Resp: 18 SpO2: 94 % O2 Device: None (Room air) Oxygen Delivery: 2 LPM  Weight: 244 lbs 11.37 oz  Constitutional: awake, alert, cooperative, no apparent distress, and appears stated age  Respiratory: No increased work of breathing, good air exchange, clear to auscultation bilaterally, no crackles or  wheezing  Cardiovascular: regular rate and rhythm  GI: normal bowel sounds  Skin: normal skin color, texture, turgor  Musculoskeletal: There is no redness, warmth, or swelling of the joints.  Full range of motion noted.  Motor strength is 5 out of 5 all extremities bilaterally.  Tone is normal.       [facility]    Discharge Disposition   Discharged to home  Condition at discharge: Stable    Significant Results and Procedures   Most Recent 3 CBC's:  Recent Labs   Lab Test 02/16/19  0047 06/22/17  0731 06/21/17  1855   WBC 12.4* 16.3* 12.6*   HGB 14.3 14.4 14.9   MCV 93 94 96    333 399     Most Recent 3 BMP's:  Recent Labs   Lab Test 02/16/19  0047 06/21/17  1855 06/12/17  0518    140 139   POTASSIUM 3.4 4.2 4.2   CHLORIDE 105 104 103   CO2 26 27 27   BUN 17 12 21   CR 1.29* 1.18 0.92   ANIONGAP 9 9 9   LAUREN 8.7 8.2* 7.9*   * 94 158*     Most Recent 3 Troponin's:  Recent Labs   Lab Test 02/16/19  1102 02/16/19  0647 02/16/19  0301   TROPI <0.015 <0.015 <0.015   ,   Results for orders placed or performed during the hospital encounter of 02/16/19   CT Aortic Survey w Contrast    Narrative    CT AORTIC SURVEY W CONTRAST  2/16/2019 1:36 AM      HISTORY: Severe chest pain radiating to back, rule out dissection.    TECHNIQUE: CT chest, abdomen and pelvis with intravenous contrast.  Radiation dose for this scan was reduced using automated exposure  control, adjustment of the mA and/or kV according to patient size, or  iterative reconstruction technique. 100 mL Isovue-370.    COMPARISON: None.    FINDINGS:  Chest:  The thoracic aorta is calcified and mildly tortuous. No aortic  aneurysm or dissection. No large central pulmonary embolus. The heart  size is normal. There are calcified lymph nodes in the right hilum and  mediastinum. No lymph node enlargement. There are a few bulla in the  lung apices. Dependent atelectasis bilaterally. 0.5 cm nodule along  the left  major fissure. Mild scarring at the lung bases. No  pneumothorax or pleural effusion.    Abdomen: There is no abdominal aortic aneurysm or dissection. The  liver, gallbladder, pancreas, adrenal glands and kidneys are normal in  appearance. There are calcified granulomas in the spleen. No abdominal  or pelvic lymph node enlargement.    Pelvis: There is no bowel obstruction or inflammation. No free  intraperitoneal gas or fluid. Degenerative disease in the spine. Old  left rib fractures.      Impression    IMPRESSION:  1. There is no aortic aneurysm or dissection.  2. Small indeterminate left lung nodule. Follow-up recommended.  3. No acute abdominal or pelvic abnormality.    Recommendations for one or multiple incidental lung nodules < 6mm :    Low risk patients: No routine follow-up.    High risk patients: Optional follow-up CT at twelve months; if  unchanged, no further follow-up.    *Low Risk: Minimal or absent history of smoking or other known risk  factors.  *Nonsolid (ground-glass) or partly solid nodules may require longer  follow-up to exclude indolent adenocarcinoma.  *Recommendations based on Guidelines for the Management of Incidental  Pulmonary Nodules Detected at CT: From the Fleischner Society 2017,  Radiology 2017.   Abdomen US, limited (RUQ only)    Narrative    US ABDOMEN LIMITED  2/16/2019 3:29 AM      HISTORY: Right upper quadrant abdominal and chest pain.    COMPARISON: None.    FINDINGS: The liver is normal in size and texture without focal mass.  There is no intra or extrahepatic biliary dilatation. The common  hepatic duct measures 0.4 cm. There is sludge in the gallbladder. The  gallbladder otherwise appears normal. The pancreas head and body  appear normal. The tail is obscured by bowel gas. The right kidney  measures 11.1 cm and is normal in appearance. The proximal abdominal  aorta and IVC appear normal.       Impression    IMPRESSION: There is a small amount of sludge in the gallbladder.  No  gallstones or biliary dilatation.       Discharge Orders      GENERAL SURG ADULT REFERRAL      Reason for your hospital stay    You were in the hospital for chest pain, troponin negative and heart tests did not indicate the pain was from a heart problem. It seems that the pain is from acid reflux, (GERD. )     Follow-up and recommended labs and tests     Follow up with primary care provider, [facility], within 7 days for hospital follow- up.  No follow up labs or test are needed.    Follow up with Surgeon , in [place],  for hospital follow- up and possible EGD.     Activity    Your activity upon discharge: activity as tolerated     Diet    Follow this diet upon discharge: Orders Placed This Encounter      Combination Diet Regular Diet Adult; No Caffeine Diet     Discharge Medications   Current Discharge Medication List      START taking these medications    Details   magic mouthwash (FIRST-MOUTHWASH BLM) compounding kit Swish and swallow 10 mLs in mouth every 6 hours as needed for mild pain  Qty: 240 mL, Refills: 1    Associated Diagnoses: Gastroesophageal reflux disease, esophagitis presence not specified; Chest pain, unspecified type      omeprazole (PRILOSEC) 40 MG DR capsule Take 1 capsule (40 mg) by mouth 2 times daily (before meals)  Qty: 60 capsule, Refills: 0    Associated Diagnoses: Gastroesophageal reflux disease, esophagitis presence not specified         CONTINUE these medications which have CHANGED    Details   atorvastatin (LIPITOR) 40 MG tablet Take 1 tablet (40 mg) by mouth daily  Qty: 30 tablet, Refills: 0    Associated Diagnoses: Coronary artery disease involving native coronary artery of native heart without angina pectoris      fluticasone (FLONASE) 50 MCG/ACT nasal spray Spray 1 spray into both nostrils daily  Qty: 1 Bottle, Refills: 1    Associated Diagnoses: Reactive airway disease with wheezing with acute exacerbation, unspecified asthma severity, unspecified whether  persistent      lisinopril (PRINIVIL/ZESTRIL) 5 MG tablet Take 1 tablet (5 mg) by mouth daily  Qty: 30 tablet, Refills: 0    Associated Diagnoses: Coronary artery disease involving native coronary artery of native heart without angina pectoris         CONTINUE these medications which have NOT CHANGED    Details   albuterol (PROAIR HFA/PROVENTIL HFA/VENTOLIN HFA) 108 (90 BASE) MCG/ACT Inhaler Inhale 2 puffs into the lungs 4 times daily For 5 days, then use 2 puffs every 4 hours as needed  Qty: 1 Inhaler, Refills: 0    Associated Diagnoses: Reactive airway disease with wheezing with acute exacerbation      aspirin EC 81 MG EC tablet Take 1 tablet (81 mg) by mouth daily  Qty: 30 tablet, Refills: 3    Associated Diagnoses: Coronary artery disease involving native coronary artery of native heart without angina pectoris      ipratropium - albuterol 0.5 mg/2.5 mg/3 mL (DUONEB) 0.5-2.5 (3) MG/3ML neb solution Take 1 vial (3 mLs) by nebulization every 4 hours as needed for shortness of breath / dyspnea or wheezing  Qty: 75 mL, Refills: 0    Associated Diagnoses: Other emphysema (H)      acetaminophen (TYLENOL) 500 MG tablet Take 2 tablets (1,000 mg) by mouth every 6 hours as needed for mild pain    Associated Diagnoses: Chest wall pain      diphenhydrAMINE (BENADRYL) 50 MG capsule Take 1 capsule (50 mg) by mouth every 6 hours as needed for itching  Qty: 56 capsule    Associated Diagnoses: Urticaria           Allergies   Allergies   Allergen Reactions     Penicillins Anaphylaxis     Dust Mites      Pollen Extract

## 2019-02-16 NOTE — ASSESSMENT & PLAN NOTE
No current symptoms, controlled at home taking PRN duo nebs, albuterol MDI with daily use of Symbicort  No change

## 2019-02-16 NOTE — H&P
[facility]    History and Physical - Hospitalist Service       Date of Admission:  2/16/2019    Assessment & Plan   Eliseo Davis is a 58 year old male admitted on 2/16/2019. He presents with acute onset of chest pain last evening at bedtime, approximately 11:00 PM.  Radiated to his back, associated with shortness of breath and nausea.  On arrival to the ED EKG showed some mild T wave changes with initial troponin studies x2 being negative.  CT imaging is showing no acute changes and currently he is pain-free.  He has history of similar pain in June 2017 which resulted in transfer to [facility] with angiogram showing diffuse disease, no intervention done.  Patient will be registered observation and monitor with telemetry with serial troponins studies done.  If remains asymptomatic, studies are negative, would anticipate discharge later with outpatient stress testing.    Chest pain  Presenting with chest pain radiating to the back, acutely occurring at bedtime associate with nausea and shortness of breath  Risk factors with previous smoking history of obesity, age and hyperlipidemia on medications  Had angiogram in June 2017 showing mild diffuse disease.  Initial troponin studies x2 are negative, EKG with no acute changes  Vian observation with serial troponins x2 more.  If negative, anticipate discharge home with outpatient stress testing    COPD (chronic obstructive pulmonary disease) (H)  No current symptoms, controlled at home taking PRN duo nebs, albuterol MDI with daily use of Symbicort  No change    Hyperlipidemia LDL goal <100  Controlled at home taking Lipitor, [facility]  [facility]         Diet: NPO for Medical/Clinical Reasons Except for: Meds    DVT Prophylaxis: Observation status  Mcintyre Catheter: not present  Code Status: Full code    Disposition Plan   Expected discharge: Later today, recommended to prior living arrangement once Troponin studies  negative, no recurrence of pain.  Entered: Donell Barros MD 2019, 5:20 AM     The patient's care was discussed with the Patient.    Donell Barros MD  [facility]    ______________________________________________________________________    Chief Complaint   58-year-old male with acute onset of chest pain    History is obtained from the patient, electronic health record and emergency department physician    History of Present Illness   Eliseo Davis is a 58 year old male who presents with acute onset of chest pain.  Occurred last evening while going to bed, described as a sharp tearing type of pain that he felt across his chest with radiation straight into the back.  He was associate with nausea shortness of breath but no diaphoresis.  He denied any injury, eating anything abnormal.  He had been seen in 2017 with chest pain, transferred to [facility] where he underwent an angiogram which showed only diffuse disease.  Since then he is being followed at the [facility], taking Lipitor for hypercholesterolemia, a daily baby aspirin, and probably a low dose of lisinopril.  Also has a history of COPD, taking daily Symbicort with as needed nebs.  This is not giving him any recent trouble.  There is no family history of heart disease, most everyone is  from cancer.  Prior to the pain he was otherwise feeling well, denying any cough, GI symptoms.  In the emergency department he had been given a nitroglycerin which dropped his pressures which have now rebounded with IV fluids, and is feeling better    Review of Systems    The 10 point Review of Systems is negative other than noted in the HPI or here.     Past Medical History    I have reviewed this patient's medical history and updated it with pertinent information if needed.   Past Medical History:   Diagnosis Date     CARDIOVASCULAR SCREENING; LDL GOAL LESS THAN 130 2013     Uncomplicated asthma         Past Surgical History   I have reviewed this patient's surgical history and updated it with pertinent information if needed.  Past Surgical History:   Procedure Laterality Date     CORONARY ANGIOGRAPHY ADULT ORDER  06/22/2017       Social History   I have reviewed this patient's social history and updated it with pertinent information if needed.  Social History     Tobacco Use     Smoking status: Former Smoker     Packs/day: 1.00     Years: 35.00     Pack years: 35.00     Types: Cigarettes     Smokeless tobacco: Former User     Quit date: 1/5/2017   Substance Use Topics     Alcohol use: No     Comment: 2 drinks yesterday     Drug use: Yes     Types: Marijuana     Comment: 2-3 tiomes a year       Family History   I have reviewed this patient's family history and updated it with pertinent information if needed.   Family History   Problem Relation Age of Onset     Cancer Mother      Cancer Father        Prior to Admission Medications   Prior to Admission Medications   Prescriptions Last Dose Informant Patient Reported? Taking?   acetaminophen (TYLENOL) 500 MG tablet  Self No No   Sig: Take 2 tablets (1,000 mg) by mouth every 6 hours as needed for mild pain   Patient not taking: Reported on 6/19/2018   albuterol (PROAIR HFA/PROVENTIL HFA/VENTOLIN HFA) 108 (90 BASE) MCG/ACT Inhaler 2/15/2019 at 0800 Self No Yes   Sig: Inhale 2 puffs into the lungs 4 times daily For 5 days, then use 2 puffs every 4 hours as needed   Patient taking differently: Inhale 1-2 puffs into the lungs every 4 hours as needed For 5 days, then use 2 puffs every 4 hours as needed   aspirin EC 81 MG EC tablet 2/15/2019 at 0800  No Yes   Sig: Take 1 tablet (81 mg) by mouth daily   atorvastatin (LIPITOR) 40 MG tablet 2/15/2019 at 1700  No Yes   Sig: Take 1 tablet (40 mg) by mouth daily   diphenhydrAMINE (BENADRYL) 50 MG capsule  Self No No   Sig: Take 1 capsule (50 mg) by mouth every 6 hours as needed for itching   Patient not taking: Reported on  6/19/2018   fluticasone (FLONASE) 50 MCG/ACT spray 2/15/2019 at 0800 Self Yes Yes   Sig: Spray 1 spray into both nostrils daily   ipratropium - albuterol 0.5 mg/2.5 mg/3 mL (DUONEB) 0.5-2.5 (3) MG/3ML neb solution 2/15/2019 at 2100  No Yes   Sig: Take 1 vial (3 mLs) by nebulization every 4 hours as needed for shortness of breath / dyspnea or wheezing   lisinopril (PRINIVIL/ZESTRIL) 5 MG tablet 2/15/2019 at 1700  No Yes   Sig: Take 1 tablet (5 mg) by mouth daily      Facility-Administered Medications: None     Allergies   Allergies   Allergen Reactions     Penicillins Anaphylaxis     Dust Mites      Pollen Extract        Physical Exam   Vital Signs:     BP: 116/67 Pulse: 64 Heart Rate: 73 Resp: 17 SpO2: 92 % O2 Device: Nasal cannula Oxygen Delivery: 2 LPM  Weight: 244 lbs 11.37 oz    Constitutional: awake, alert, cooperative, no apparent distress, and appears stated age and mildly obese  Eyes: Lids and lashes normal, pupils equal, round and reactive to light, extra ocular muscles intact, sclera clear, conjunctiva normal  ENT: Normocephalic, without obvious abnormality, atraumatic, sinuses nontender on palpation, external ears without lesions, oral pharynx with moist mucous membranes, tonsils without erythema or exudates, gums normal and good dentition.  Hematologic / Lymphatic: no cervical lymphadenopathy and no supraclavicular lymphadenopathy  Respiratory: No increased work of breathing, good air exchange, clear to auscultation bilaterally, no crackles or wheezing  Cardiovascular: Normal apical impulse, regular rate and rhythm, normal S1 and S2, no S3 or S4, and no murmur noted  GI: No scars, normal bowel sounds, soft, non-distended, non-tender, no masses palpated, no hepatosplenomegally  Skin: no bruising or bleeding, normal skin color, texture, turgor and no redness, warmth, or swelling  Musculoskeletal: There is no redness, warmth, or swelling of the joints.  Full range of motion noted.  Motor strength is 5 out  of 5 all extremities bilaterally.  Tone is normal.  Neurologic: Awake, alert, oriented to name, place and time.  Cranial nerves II-XII are grossly intact.  Motor is 5 out of 5 bilaterally.    Data   Data reviewed today: I reviewed all medications, new labs and imaging results over the last 24 hours. I personally reviewed the EKG tracing showing No acute changes, may be T waves peaked in the lateral leads.    Results for orders placed or performed during the hospital encounter of 02/16/19   CT Aortic Survey w Contrast    Narrative    CT AORTIC SURVEY W CONTRAST  2/16/2019 1:36 AM      HISTORY: Severe chest pain radiating to back, rule out dissection.    TECHNIQUE: CT chest, abdomen and pelvis with intravenous contrast.  Radiation dose for this scan was reduced using automated exposure  control, adjustment of the mA and/or kV according to patient size, or  iterative reconstruction technique. 100 mL Isovue-370.    COMPARISON: None.    FINDINGS:  Chest:  The thoracic aorta is calcified and mildly tortuous. No aortic  aneurysm or dissection. No large central pulmonary embolus. The heart  size is normal. There are calcified lymph nodes in the right hilum and  mediastinum. No lymph node enlargement. There are a few bulla in the  lung apices. Dependent atelectasis bilaterally. 0.5 cm nodule along  the left major fissure. Mild scarring at the lung bases. No  pneumothorax or pleural effusion.    Abdomen: There is no abdominal aortic aneurysm or dissection. The  liver, gallbladder, pancreas, adrenal glands and kidneys are normal in  appearance. There are calcified granulomas in the spleen. No abdominal  or pelvic lymph node enlargement.    Pelvis: There is no bowel obstruction or inflammation. No free  intraperitoneal gas or fluid. Degenerative disease in the spine. Old  left rib fractures.      Impression    IMPRESSION:  1. There is no aortic aneurysm or dissection.  2. Small indeterminate left lung nodule. Follow-up  recommended.  3. No acute abdominal or pelvic abnormality.    Recommendations for one or multiple incidental lung nodules < 6mm :    Low risk patients: No routine follow-up.    High risk patients: Optional follow-up CT at twelve months; if  unchanged, no further follow-up.    *Low Risk: Minimal or absent history of smoking or other known risk  factors.  *Nonsolid (ground-glass) or partly solid nodules may require longer  follow-up to exclude indolent adenocarcinoma.  *Recommendations based on Guidelines for the Management of Incidental  Pulmonary Nodules Detected at CT: From the Fleischner Society 2017,  Radiology 2017.   Abdomen US, limited (RUQ only)    Narrative    US ABDOMEN LIMITED  2/16/2019 3:29 AM      HISTORY: Right upper quadrant abdominal and chest pain.    COMPARISON: None.    FINDINGS: The liver is normal in size and texture without focal mass.  There is no intra or extrahepatic biliary dilatation. The common  hepatic duct measures 0.4 cm. There is sludge in the gallbladder. The  gallbladder otherwise appears normal. The pancreas head and body  appear normal. The tail is obscured by bowel gas. The right kidney  measures 11.1 cm and is normal in appearance. The proximal abdominal  aorta and IVC appear normal.       Impression    IMPRESSION: There is a small amount of sludge in the gallbladder. No  gallstones or biliary dilatation.   CBC with platelets differential   Result Value Ref Range    WBC 12.4 (H) 4.0 - 11.0 10e9/L    RBC Count 4.62 4.4 - 5.9 10e12/L    Hemoglobin 14.3 13.3 - 17.7 g/dL    Hematocrit 42.8 40.0 - 53.0 %    MCV 93 78 - 100 fl    MCH 31.0 26.5 - 33.0 pg    MCHC 33.4 31.5 - 36.5 g/dL    RDW 13.4 10.0 - 15.0 %    Platelet Count 378 150 - 450 10e9/L    Diff Method Automated Method     % Neutrophils 46.3 %    % Lymphocytes 41.5 %    % Monocytes 8.8 %    % Eosinophils 2.3 %    % Basophils 0.9 %    % Immature Granulocytes 0.2 %    Nucleated RBCs 0 0 /100    Absolute Neutrophil 5.7 1.6 - 8.3  10e9/L    Absolute Lymphocytes 5.1 0.8 - 5.3 10e9/L    Absolute Monocytes 1.1 0.0 - 1.3 10e9/L    Absolute Basophils 0.1 0.0 - 0.2 10e9/L    Abs Immature Granulocytes 0.0 0 - 0.4 10e9/L    Absolute Nucleated RBC 0.0    Troponin I   Result Value Ref Range    Troponin I ES <0.015 0.000 - 0.045 ug/L   Comprehensive metabolic panel   Result Value Ref Range    Sodium 140 133 - 144 mmol/L    Potassium 3.4 3.4 - 5.3 mmol/L    Chloride 105 94 - 109 mmol/L    Carbon Dioxide 26 20 - 32 mmol/L    Anion Gap 9 3 - 14 mmol/L    Glucose 119 (H) 70 - 99 mg/dL    Urea Nitrogen 17 7 - 30 mg/dL    Creatinine 1.29 (H) 0.66 - 1.25 mg/dL    GFR Estimate 61 >60 mL/min/[1.73_m2]    GFR Estimate If Black 71 >60 mL/min/[1.73_m2]    Calcium 8.7 8.5 - 10.1 mg/dL    Bilirubin Total 0.4 0.2 - 1.3 mg/dL    Albumin 3.6 3.4 - 5.0 g/dL    Protein Total 7.3 6.8 - 8.8 g/dL    Alkaline Phosphatase 111 40 - 150 U/L    ALT 42 0 - 70 U/L    AST 24 0 - 45 U/L   D dimer quantitative   Result Value Ref Range    D Dimer 0.3 0.0 - 0.50 ug/ml FEU   Lipase   Result Value Ref Range    Lipase 198 73 - 393 U/L   Troponin I   Result Value Ref Range    Troponin I ES <0.015 0.000 - 0.045 ug/L

## 2019-02-18 ENCOUNTER — TELEPHONE (OUTPATIENT)
Dept: FAMILY MEDICINE | Facility: OTHER | Age: 59
End: 2019-02-18

## 2019-02-18 NOTE — TELEPHONE ENCOUNTER
Patient called to schedule an appointment for a hospital follow-up or appeared on a report showing that they were recently discharged from the hospital.    Patient was admitted to :  [facility]  Discharged date: 02-  Reason for hospital admission:  Acute Chest Pain, Gastroesophageal Reflux Disease, Esophagitis Presence Not Specified  Does patient have future appointment scheduled with provider? No  Date of future appointment:  na      This information will be used to help the care team plan for the patients upcoming visit.  The triage RN may determine that a follow up call is necessary and reach out to the patient via the phone number listed in the chart.     Please route this message on routine priority to the Triage RN pool.

## 2019-02-18 NOTE — TELEPHONE ENCOUNTER
"Hospital/TCU/ED for chronic condition Discharge Protocol    \"Hi, my name is Shari Rios, a registered nurse, and I am calling from [facility].  I am calling to follow up and see how things are going for you after your recent emergency visit/hospital/TCU stay.\"    Tell me how you are doing now that you are home?\" Been better. Still having some of the same pain. They think I have GERD\"      Discharge Instructions    \"Let's review your discharge instructions.  What is/are the follow-up recommendations?  Pt. Response: follow up as needed    \"Has an appointment with your primary care provider been scheduled?\"   No (schedule appointment)    \"When you see the provider, I would recommend that you bring your medications with you.\"    Medications    \"Tell me what changed about your medicines when you discharged?\"    Changes to chronic meds?    0-1    \"What questions do you have about your medications?\"    None     New diagnoses of heart failure, COPD, diabetes, or MI?    No              Medication reconciliation completed? Yes  Was MTM referral placed (*Make sure to put transitions as reason for referral)?   No    Call Summary    \"What questions or concerns do you have about your recent visit and your follow-up care?\"     none    \"If you have questions or things don't continue to improve, we encourage you contact us through the main clinic number (give number).  Even if the clinic is not open, triage nurses are available 24/7 to help you.     We would like you to know that our clinic has extended hours (provide information).  We also have urgent care (provide details on closest location and hours/contact info)\"      \"Thank you for your time and take care!\"    ALESIA Ferrer, RN  [facility]  "

## 2019-02-20 ENCOUNTER — TELEPHONE (OUTPATIENT)
Dept: SURGERY | Facility: CLINIC | Age: 59
End: 2019-02-20

## 2019-02-20 ENCOUNTER — OFFICE VISIT (OUTPATIENT)
Dept: SURGERY | Facility: CLINIC | Age: 59
End: 2019-02-20
Payer: COMMERCIAL

## 2019-02-20 VITALS — HEIGHT: 69 IN | BODY MASS INDEX: 36.14 KG/M2 | WEIGHT: 244 LBS | TEMPERATURE: 97.8 F

## 2019-02-20 DIAGNOSIS — K21.00 GERD WITH ESOPHAGITIS: Primary | ICD-10-CM

## 2019-02-20 PROCEDURE — 99204 OFFICE O/P NEW MOD 45 MIN: CPT | Performed by: SURGERY

## 2019-02-20 RX ORDER — BUDESONIDE AND FORMOTEROL FUMARATE DIHYDRATE 80; 4.5 UG/1; UG/1
2 AEROSOL RESPIRATORY (INHALATION) 2 TIMES DAILY
COMMUNITY

## 2019-02-20 RX ORDER — LIDOCAINE 40 MG/G
CREAM TOPICAL
Status: CANCELLED | OUTPATIENT
Start: 2019-02-20

## 2019-02-20 RX ORDER — ONDANSETRON 2 MG/ML
4 INJECTION INTRAMUSCULAR; INTRAVENOUS
Status: CANCELLED | OUTPATIENT
Start: 2019-02-20

## 2019-02-20 ASSESSMENT — MIFFLIN-ST. JEOR: SCORE: 1918.19

## 2019-02-20 NOTE — PROGRESS NOTES
[facility]    HPI:  Patient is a 58 year old male with many decade history of heartburn and reflux symptoms. He has taken many different over the counter medications in the past but none regularly and no PPI. He has never had an EGD. He was seen in the ED and actually hospitalized for epigastric and lower chest pain this past weekend with vomiting. They ruled out ACS and recommended consultation for possible EGD. He does feel better with the omeprazole they have prescribed. No blood in the emesis or stool. He is up to date on his colonoscopy.    Review Of Systems    Skin: negative  Ears/Nose/Throat: negative  Respiratory: No shortness of breath, dyspnea on exertion, cough, or hemoptysis  Cardiovascular: negative  Gastrointestinal: as above  Genitourinary: negative  Musculoskeletal: negative  Neurologic: negative  Hematologic/Lymphatic/Immunologic: negative  Endocrine: negative      Past Medical History:   Diagnosis Date     CARDIOVASCULAR SCREENING; LDL GOAL LESS THAN 130 9/25/2013     Uncomplicated asthma        Past Surgical History:   Procedure Laterality Date     CORONARY ANGIOGRAPHY ADULT ORDER  06/22/2017       Family History   Problem Relation Age of Onset     Cancer Mother      Cancer Father        Social History     Socioeconomic History     Marital status: Single     Spouse name: Not on file     Number of children: Not on file     Years of education: Not on file     Highest education level: Not on file   Occupational History     Not on file   Social Needs     Financial resource strain: Not on file     Food insecurity:     Worry: Not on file     Inability: Not on file     Transportation needs:     Medical: Not on file     Non-medical: Not on file   Tobacco Use     Smoking status: Former Smoker     Packs/day: 1.00     Years: 35.00     Pack years: 35.00     Types: Cigarettes     Smokeless tobacco: Former User     Quit date: 1/5/2017   Substance and Sexual  Activity     Alcohol use: No     Comment: 2 drinks yesterday     Drug use: Yes     Types: Marijuana     Comment: 2-3 tiomes a year     Sexual activity: Not Currently   Lifestyle     Physical activity:     Days per week: Not on file     Minutes per session: Not on file     Stress: Not on file   Relationships     Social connections:     Talks on phone: Not on file     Gets together: Not on file     Attends [employer] service: Not on file     Active member of club or organization: Not on file     Attends meetings of clubs or organizations: Not on file     Relationship status: Not on file     Intimate partner violence:     Fear of current or ex partner: Not on file     Emotionally abused: Not on file     Physically abused: Not on file     Forced sexual activity: Not on file   Other Topics Concern     Parent/sibling w/ CABG, MI or angioplasty before 65F 55M? Not Asked   Social History Narrative     Not on file       Current Outpatient Medications   Medication Sig Dispense Refill     acetaminophen (TYLENOL) 500 MG tablet Take 2 tablets (1,000 mg) by mouth every 6 hours as needed for mild pain (Patient not taking: Reported on 6/19/2018)       albuterol (PROAIR HFA/PROVENTIL HFA/VENTOLIN HFA) 108 (90 BASE) MCG/ACT Inhaler Inhale 2 puffs into the lungs 4 times daily For 5 days, then use 2 puffs every 4 hours as needed (Patient taking differently: Inhale 1-2 puffs into the lungs every 4 hours as needed For 5 days, then use 2 puffs every 4 hours as needed) 1 Inhaler 0     aspirin EC 81 MG EC tablet Take 1 tablet (81 mg) by mouth daily 30 tablet 3     atorvastatin (LIPITOR) 40 MG tablet Take 1 tablet (40 mg) by mouth daily 30 tablet 0     diphenhydrAMINE (BENADRYL) 50 MG capsule Take 1 capsule (50 mg) by mouth every 6 hours as needed for itching (Patient not taking: Reported on 6/19/2018) 56 capsule      fluticasone (FLONASE) 50 MCG/ACT nasal spray Spray 1 spray into both nostrils daily 1 Bottle 1     ipratropium - albuterol 0.5  "mg/2.5 mg/3 mL (DUONEB) 0.5-2.5 (3) MG/3ML neb solution Take 1 vial (3 mLs) by nebulization every 4 hours as needed for shortness of breath / dyspnea or wheezing 75 mL 0     lisinopril (PRINIVIL/ZESTRIL) 5 MG tablet Take 1 tablet (5 mg) by mouth daily 30 tablet 0     magic mouthwash (FIRST-MOUTHWASH BLM) compounding kit Swish and swallow 10 mLs in mouth every 6 hours as needed for mild pain 240 mL 1     omeprazole (PRILOSEC) 40 MG DR capsule Take 1 capsule (40 mg) by mouth 2 times daily (before meals) 60 capsule 0       Medications and history reviewed    Physical exam:  Vitals: Temp 97.8  F (36.6  C) (Temporal)   Ht 1.746 m (5' 8.75\")   Wt 110.7 kg (244 lb)   BMI 36.30 kg/m    BMI= Body mass index is 36.3 kg/m .    Constitutional: Healthy, alert, non-distressed   Head: Normo-cephalic, atraumatic, no lesions, masses or tenderness   Cardiovascular: RRR, no new murmurs, +S1, +S2 heart sounds, no clicks, rubs or gallops   Respiratory: CTAB, no rales, rhonchi or wheezing, equal chest rise, good respiratory effort   Gastrointestinal: Soft, non-tender, non distended, no rebound rigidity or guarding, no masses or hernias palpated   : Deferred  Musculoskeletal: Moves all extremities, normal  strength, no deformities noted   Skin: No suspicious lesions or rashes   Psychiatric: Mentation appears normal, affect appropriate   Hematologic/Lymphatic/Immunologic: Normal cervical and supraclavicular lymph nodes   Patient able to get up on table without difficulty.    Labs show:  No results found for this or any previous visit (from the past 24 hour(s)).    Imaging shows:  Recent Results (from the past 744 hour(s))   CT Aortic Survey w Contrast    Narrative    CT AORTIC SURVEY W CONTRAST  2/16/2019 1:36 AM      HISTORY: Severe chest pain radiating to back, rule out dissection.    TECHNIQUE: CT chest, abdomen and pelvis with intravenous contrast.  Radiation dose for this scan was reduced using automated exposure  control, " adjustment of the mA and/or kV according to patient size, or  iterative reconstruction technique. 100 mL Isovue-370.    COMPARISON: None.    FINDINGS:  Chest:  The thoracic aorta is calcified and mildly tortuous. No aortic  aneurysm or dissection. No large central pulmonary embolus. The heart  size is normal. There are calcified lymph nodes in the right hilum and  mediastinum. No lymph node enlargement. There are a few bulla in the  lung apices. Dependent atelectasis bilaterally. 0.5 cm nodule along  the left major fissure. Mild scarring at the lung bases. No  pneumothorax or pleural effusion.    Abdomen: There is no abdominal aortic aneurysm or dissection. The  liver, gallbladder, pancreas, adrenal glands and kidneys are normal in  appearance. There are calcified granulomas in the spleen. No abdominal  or pelvic lymph node enlargement.    Pelvis: There is no bowel obstruction or inflammation. No free  intraperitoneal gas or fluid. Degenerative disease in the spine. Old  left rib fractures.      Impression    IMPRESSION:  1. There is no aortic aneurysm or dissection.  2. Small indeterminate left lung nodule. Follow-up recommended.  3. No acute abdominal or pelvic abnormality.    Recommendations for one or multiple incidental lung nodules < 6mm :    Low risk patients: No routine follow-up.    High risk patients: Optional follow-up CT at twelve months; if  unchanged, no further follow-up.    *Low Risk: Minimal or absent history of smoking or other known risk  factors.  *Nonsolid (ground-glass) or partly solid nodules may require longer  follow-up to exclude indolent adenocarcinoma.  *Recommendations based on Guidelines for the Management of Incidental  Pulmonary Nodules Detected at CT: From the Fleischner Society 2017,  Radiology 2017.    WILLA JEAN BAPTISTE MD   Abdomen US, limited (RUQ only)    Narrative    US ABDOMEN LIMITED  2/16/2019 3:29 AM      HISTORY: Right upper quadrant abdominal and chest pain.    COMPARISON:  None.    FINDINGS: The liver is normal in size and texture without focal mass.  There is no intra or extrahepatic biliary dilatation. The common  hepatic duct measures 0.4 cm. There is sludge in the gallbladder. The  gallbladder otherwise appears normal. The pancreas head and body  appear normal. The tail is obscured by bowel gas. The right kidney  measures 11.1 cm and is normal in appearance. The proximal abdominal  aorta and IVC appear normal.       Impression    IMPRESSION: There is a small amount of sludge in the gallbladder. No  gallstones or biliary dilatation.    WILLA JEAN BAPTISTE MD         Assessment:     ICD-10-CM    1. GERD with esophagitis K21.0 GASTROENTEROLOGY ADULT REF PROCEDURE ONLY [facility] [phone]     Plan: Due to the duration of Eliseo's symptoms (>40years) I recommend EGD to evaluate for schmidt's esophagus or other abnormalities. We talked about the procedure and what we are looking for. He understands and after our discussion wishes to proceed with EGD. He is clear for anesthesia for EGD. We will schedule in the near future.    Emmanuel De La Fuente, DO

## 2019-02-20 NOTE — LETTER
2/20/2019         RE: Eliseo Davis  [address] [facility] [id number]        Dear Colleague,    Thank you for referring your patient, Eliseo Davis, to the [facility]. Please see a copy of my visit note below.    Patient seen in consultation for GERD by [facility]    HPI:  Patient is a 58 year old male with many decade history of heartburn and reflux symptoms. He has taken many different over the counter medications in the past but none regularly and no PPI. He has never had an EGD. He was seen in the ED and actually hospitalized for epigastric and lower chest pain this past weekend with vomiting. They ruled out ACS and recommended consultation for possible EGD. He does feel better with the omeprazole they have prescribed. No blood in the emesis or stool. He is up to date on his colonoscopy.    Review Of Systems    Skin: negative  Ears/Nose/Throat: negative  Respiratory: No shortness of breath, dyspnea on exertion, cough, or hemoptysis  Cardiovascular: negative  Gastrointestinal: as above  Genitourinary: negative  Musculoskeletal: negative  Neurologic: negative  Hematologic/Lymphatic/Immunologic: negative  Endocrine: negative      Past Medical History:   Diagnosis Date     CARDIOVASCULAR SCREENING; LDL GOAL LESS THAN 130 9/25/2013     Uncomplicated asthma        Past Surgical History:   Procedure Laterality Date     CORONARY ANGIOGRAPHY ADULT ORDER  06/22/2017       Family History   Problem Relation Age of Onset     Cancer Mother      Cancer Father        Social History     Socioeconomic History     Marital status: Single     Spouse name: Not on file     Number of children: Not on file     Years of education: Not on file     Highest education level: Not on file   Occupational History     Not on file   Social Needs     Financial resource strain: Not on file     Food insecurity:     Worry: Not on file     Inability: Not on file     Transportation needs:     Medical: Not on file      Non-medical: Not on file   Tobacco Use     Smoking status: Former Smoker     Packs/day: 1.00     Years: 35.00     Pack years: 35.00     Types: Cigarettes     Smokeless tobacco: Former User     Quit date: 1/5/2017   Substance and Sexual Activity     Alcohol use: No     Comment: 2 drinks yesterday     Drug use: Yes     Types: Marijuana     Comment: 2-3 tiomes a year     Sexual activity: Not Currently   Lifestyle     Physical activity:     Days per week: Not on file     Minutes per session: Not on file     Stress: Not on file   Relationships     Social connections:     Talks on phone: Not on file     Gets together: Not on file     Attends Quaker service: Not on file     Active member of club or organization: Not on file     Attends meetings of clubs or organizations: Not on file     Relationship status: Not on file     Intimate partner violence:     Fear of current or ex partner: Not on file     Emotionally abused: Not on file     Physically abused: Not on file     Forced sexual activity: Not on file   Other Topics Concern     Parent/sibling w/ CABG, MI or angioplasty before 65F 55M? Not Asked   Social History Narrative     Not on file       Current Outpatient Medications   Medication Sig Dispense Refill     acetaminophen (TYLENOL) 500 MG tablet Take 2 tablets (1,000 mg) by mouth every 6 hours as needed for mild pain (Patient not taking: Reported on 6/19/2018)       albuterol (PROAIR HFA/PROVENTIL HFA/VENTOLIN HFA) 108 (90 BASE) MCG/ACT Inhaler Inhale 2 puffs into the lungs 4 times daily For 5 days, then use 2 puffs every 4 hours as needed (Patient taking differently: Inhale 1-2 puffs into the lungs every 4 hours as needed For 5 days, then use 2 puffs every 4 hours as needed) 1 Inhaler 0     aspirin EC 81 MG EC tablet Take 1 tablet (81 mg) by mouth daily 30 tablet 3     atorvastatin (LIPITOR) 40 MG tablet Take 1 tablet (40 mg) by mouth daily 30 tablet 0     diphenhydrAMINE (BENADRYL) 50 MG capsule Take 1 capsule (50  "mg) by mouth every 6 hours as needed for itching (Patient not taking: Reported on 6/19/2018) 56 capsule      fluticasone (FLONASE) 50 MCG/ACT nasal spray Spray 1 spray into both nostrils daily 1 Bottle 1     ipratropium - albuterol 0.5 mg/2.5 mg/3 mL (DUONEB) 0.5-2.5 (3) MG/3ML neb solution Take 1 vial (3 mLs) by nebulization every 4 hours as needed for shortness of breath / dyspnea or wheezing 75 mL 0     lisinopril (PRINIVIL/ZESTRIL) 5 MG tablet Take 1 tablet (5 mg) by mouth daily 30 tablet 0     magic mouthwash (FIRST-MOUTHWASH BLM) compounding kit Swish and swallow 10 mLs in mouth every 6 hours as needed for mild pain 240 mL 1     omeprazole (PRILOSEC) 40 MG DR capsule Take 1 capsule (40 mg) by mouth 2 times daily (before meals) 60 capsule 0       Medications and history reviewed    Physical exam:  Vitals: Temp 97.8  F (36.6  C) (Temporal)   Ht 1.746 m (5' 8.75\")   Wt 110.7 kg (244 lb)   BMI 36.30 kg/m     BMI= Body mass index is 36.3 kg/m .    Constitutional: Healthy, alert, non-distressed   Head: Normo-cephalic, atraumatic, no lesions, masses or tenderness   Cardiovascular: RRR, no new murmurs, +S1, +S2 heart sounds, no clicks, rubs or gallops   Respiratory: CTAB, no rales, rhonchi or wheezing, equal chest rise, good respiratory effort   Gastrointestinal: Soft, non-tender, non distended, no rebound rigidity or guarding, no masses or hernias palpated   : Deferred  Musculoskeletal: Moves all extremities, normal  strength, no deformities noted   Skin: No suspicious lesions or rashes   Psychiatric: Mentation appears normal, affect appropriate   Hematologic/Lymphatic/Immunologic: Normal cervical and supraclavicular lymph nodes   Patient able to get up on table without difficulty.    Labs show:  No results found for this or any previous visit (from the past 24 hour(s)).    Imaging shows:  Recent Results (from the past 744 hour(s))   CT Aortic Survey w Contrast    Narrative    CT AORTIC SURVEY W CONTRAST  " 2/16/2019 1:36 AM      HISTORY: Severe chest pain radiating to back, rule out dissection.    TECHNIQUE: CT chest, abdomen and pelvis with intravenous contrast.  Radiation dose for this scan was reduced using automated exposure  control, adjustment of the mA and/or kV according to patient size, or  iterative reconstruction technique. 100 mL Isovue-370.    COMPARISON: None.    FINDINGS:  Chest:  The thoracic aorta is calcified and mildly tortuous. No aortic  aneurysm or dissection. No large central pulmonary embolus. The heart  size is normal. There are calcified lymph nodes in the right hilum and  mediastinum. No lymph node enlargement. There are a few bulla in the  lung apices. Dependent atelectasis bilaterally. 0.5 cm nodule along  the left major fissure. Mild scarring at the lung bases. No  pneumothorax or pleural effusion.    Abdomen: There is no abdominal aortic aneurysm or dissection. The  liver, gallbladder, pancreas, adrenal glands and kidneys are normal in  appearance. There are calcified granulomas in the spleen. No abdominal  or pelvic lymph node enlargement.    Pelvis: There is no bowel obstruction or inflammation. No free  intraperitoneal gas or fluid. Degenerative disease in the spine. Old  left rib fractures.      Impression    IMPRESSION:  1. There is no aortic aneurysm or dissection.  2. Small indeterminate left lung nodule. Follow-up recommended.  3. No acute abdominal or pelvic abnormality.    Recommendations for one or multiple incidental lung nodules < 6mm :    Low risk patients: No routine follow-up.    High risk patients: Optional follow-up CT at twelve months; if  unchanged, no further follow-up.    *Low Risk: Minimal or absent history of smoking or other known risk  factors.  *Nonsolid (ground-glass) or partly solid nodules may require longer  follow-up to exclude indolent adenocarcinoma.  *Recommendations based on Guidelines for the Management of Incidental  Pulmonary Nodules Detected at CT:  From the Fleischner Society 2017,  Radiology 2017.    WILLA JEAN BAPTISTE MD   Abdomen US, limited (RUQ only)    Narrative    US ABDOMEN LIMITED  2/16/2019 3:29 AM      HISTORY: Right upper quadrant abdominal and chest pain.    COMPARISON: None.    FINDINGS: The liver is normal in size and texture without focal mass.  There is no intra or extrahepatic biliary dilatation. The common  hepatic duct measures 0.4 cm. There is sludge in the gallbladder. The  gallbladder otherwise appears normal. The pancreas head and body  appear normal. The tail is obscured by bowel gas. The right kidney  measures 11.1 cm and is normal in appearance. The proximal abdominal  aorta and IVC appear normal.       Impression    IMPRESSION: There is a small amount of sludge in the gallbladder. No  gallstones or biliary dilatation.    WILLA JEAN BAPTISTE MD         Assessment:     ICD-10-CM    1. GERD with esophagitis K21.0 GASTROENTEROLOGY ADULT REF PROCEDURE ONLY [facility] [phone]     Plan: Due to the duration of Eliseo's symptoms (>40years) I recommend EGD to evaluate for schmidt's esophagus or other abnormalities. We talked about the procedure and what we are looking for. He understands and after our discussion wishes to proceed with EGD. He is clear for anesthesia for EGD. We will schedule in the near future.    Emmanuel De La Fuente DO      Again, thank you for allowing me to participate in the care of your patient.        Sincerely,        Emmanuel De La Fuente DO

## 2019-02-20 NOTE — TELEPHONE ENCOUNTER
Date of colonoscopy/EGD: 2/25/19  Surgeon: Dr. De La Fuente  Prep:EGD  Packet:given to pateint in clinic.    Date: 2/20/2019      Surgery Scheduler

## 2019-02-25 ENCOUNTER — ANESTHESIA EVENT (OUTPATIENT)
Dept: GASTROENTEROLOGY | Facility: CLINIC | Age: 59
End: 2019-02-25
Payer: COMMERCIAL

## 2019-02-25 ENCOUNTER — ANESTHESIA (OUTPATIENT)
Dept: GASTROENTEROLOGY | Facility: CLINIC | Age: 59
End: 2019-02-25
Payer: COMMERCIAL

## 2019-02-25 ENCOUNTER — HOSPITAL ENCOUNTER (OUTPATIENT)
Facility: CLINIC | Age: 59
Discharge: HOME | End: 2019-02-25
Attending: SURGERY | Admitting: SURGERY
Payer: COMMERCIAL

## 2019-02-25 VITALS
DIASTOLIC BLOOD PRESSURE: 79 MMHG | OXYGEN SATURATION: 95 % | RESPIRATION RATE: 16 BRPM | SYSTOLIC BLOOD PRESSURE: 133 MMHG

## 2019-02-25 LAB — UPPER GI ENDOSCOPY: NORMAL

## 2019-02-25 PROCEDURE — 25000128 H RX IP 250 OP 636: Performed by: NURSE ANESTHETIST, CERTIFIED REGISTERED

## 2019-02-25 PROCEDURE — 25800030 ZZH RX IP 258 OP 636: Performed by: NURSE ANESTHETIST, CERTIFIED REGISTERED

## 2019-02-25 PROCEDURE — 88305 TISSUE EXAM BY PATHOLOGIST: CPT | Performed by: SURGERY

## 2019-02-25 PROCEDURE — 25000125 ZZHC RX 250: Performed by: SURGERY

## 2019-02-25 PROCEDURE — 25000128 H RX IP 250 OP 636: Performed by: SURGERY

## 2019-02-25 PROCEDURE — 37000009 ZZH ANESTHESIA TECHNICAL FEE, EACH ADDTL 15 MIN: Performed by: SURGERY

## 2019-02-25 PROCEDURE — 43239 EGD BIOPSY SINGLE/MULTIPLE: CPT | Performed by: SURGERY

## 2019-02-25 PROCEDURE — 40000296 ZZH STATISTIC ENDO RECOVERY CLASS 1:2 FIRST HOUR: Performed by: SURGERY

## 2019-02-25 PROCEDURE — 88305 TISSUE EXAM BY PATHOLOGIST: CPT | Mod: 26,59 | Performed by: SURGERY

## 2019-02-25 PROCEDURE — 37000008 ZZH ANESTHESIA TECHNICAL FEE, 1ST 30 MIN: Performed by: SURGERY

## 2019-02-25 PROCEDURE — 25000125 ZZHC RX 250: Performed by: NURSE ANESTHETIST, CERTIFIED REGISTERED

## 2019-02-25 RX ORDER — ONDANSETRON 2 MG/ML
4 INJECTION INTRAMUSCULAR; INTRAVENOUS
Status: COMPLETED | OUTPATIENT
Start: 2019-02-25 | End: 2019-02-25

## 2019-02-25 RX ORDER — PROPOFOL 10 MG/ML
INJECTION, EMULSION INTRAVENOUS PRN
Status: DISCONTINUED | OUTPATIENT
Start: 2019-02-25 | End: 2019-02-25

## 2019-02-25 RX ORDER — LIDOCAINE 40 MG/G
CREAM TOPICAL
Status: DISCONTINUED | OUTPATIENT
Start: 2019-02-25 | End: 2019-02-25 | Stop reason: HOSPADM

## 2019-02-25 RX ORDER — PROPOFOL 10 MG/ML
INJECTION, EMULSION INTRAVENOUS CONTINUOUS PRN
Status: DISCONTINUED | OUTPATIENT
Start: 2019-02-25 | End: 2019-02-25

## 2019-02-25 RX ORDER — ONDANSETRON 4 MG/1
4 TABLET, ORALLY DISINTEGRATING ORAL EVERY 6 HOURS PRN
Status: DISCONTINUED | OUTPATIENT
Start: 2019-02-25 | End: 2019-02-25 | Stop reason: HOSPADM

## 2019-02-25 RX ORDER — LIDOCAINE HYDROCHLORIDE 20 MG/ML
INJECTION, SOLUTION INFILTRATION; PERINEURAL PRN
Status: DISCONTINUED | OUTPATIENT
Start: 2019-02-25 | End: 2019-02-25

## 2019-02-25 RX ORDER — FLUMAZENIL 0.1 MG/ML
0.2 INJECTION, SOLUTION INTRAVENOUS
Status: DISCONTINUED | OUTPATIENT
Start: 2019-02-25 | End: 2019-02-25 | Stop reason: HOSPADM

## 2019-02-25 RX ORDER — ONDANSETRON 2 MG/ML
4 INJECTION INTRAMUSCULAR; INTRAVENOUS EVERY 6 HOURS PRN
Status: DISCONTINUED | OUTPATIENT
Start: 2019-02-25 | End: 2019-02-25 | Stop reason: HOSPADM

## 2019-02-25 RX ORDER — NALOXONE HYDROCHLORIDE 0.4 MG/ML
.1-.4 INJECTION, SOLUTION INTRAMUSCULAR; INTRAVENOUS; SUBCUTANEOUS
Status: DISCONTINUED | OUTPATIENT
Start: 2019-02-25 | End: 2019-02-25 | Stop reason: HOSPADM

## 2019-02-25 RX ORDER — SODIUM CHLORIDE, SODIUM LACTATE, POTASSIUM CHLORIDE, CALCIUM CHLORIDE 600; 310; 30; 20 MG/100ML; MG/100ML; MG/100ML; MG/100ML
INJECTION, SOLUTION INTRAVENOUS CONTINUOUS
Status: DISCONTINUED | OUTPATIENT
Start: 2019-02-25 | End: 2019-02-25 | Stop reason: HOSPADM

## 2019-02-25 RX ADMIN — Medication 20 MCG: at 15:51

## 2019-02-25 RX ADMIN — LIDOCAINE HYDROCHLORIDE 1 ML: 10 INJECTION, SOLUTION EPIDURAL; INFILTRATION; INTRACAUDAL; PERINEURAL at 14:22

## 2019-02-25 RX ADMIN — LIDOCAINE HYDROCHLORIDE 60 MG: 20 INJECTION, SOLUTION INFILTRATION; PERINEURAL at 15:59

## 2019-02-25 RX ADMIN — PROPOFOL 100 MG: 10 INJECTION, EMULSION INTRAVENOUS at 16:02

## 2019-02-25 RX ADMIN — ONDANSETRON 4 MG: 2 INJECTION INTRAMUSCULAR; INTRAVENOUS at 16:02

## 2019-02-25 RX ADMIN — PROPOFOL 150 MCG/KG/MIN: 10 INJECTION, EMULSION INTRAVENOUS at 16:02

## 2019-02-25 RX ADMIN — SODIUM CHLORIDE, POTASSIUM CHLORIDE, SODIUM LACTATE AND CALCIUM CHLORIDE: 600; 310; 30; 20 INJECTION, SOLUTION INTRAVENOUS at 14:22

## 2019-02-25 ASSESSMENT — LIFESTYLE VARIABLES: TOBACCO_USE: 1

## 2019-02-25 ASSESSMENT — COPD QUESTIONNAIRES
COPD: 1
CAT_SEVERITY: MILD

## 2019-02-25 NOTE — ANESTHESIA POSTPROCEDURE EVALUATION
Patient: Eliseo Davis    Procedure(s):  COMBINED ESOPHAGOSCOPY, GASTROSCOPY, DUODENOSCOPY (EGD), BIOPSY SINGLE OR MULTIPLE    Diagnosis:GERD with esophagitis  Diagnosis Additional Information: No value filed.    Anesthesia Type:  MAC    Note:  Anesthesia Post Evaluation    Patient location during evaluation: Phase 2 and Bedside  Patient participation: Able to fully participate in evaluation  Level of consciousness: awake and alert  Pain management: satisfactory to patient  Airway patency: patent  Cardiovascular status: stable  Respiratory status: spontaneous ventilation and room air  Hydration status: stable  PONV: none     Anesthetic complications: None    Comments: Appear to tolerate MAC with IV sedation well without anesthesia related problems / complications noted.  Pain level satisfactory per patient. No N  /  V .  No complaints per patient.  Will follow as needed.        Last vitals:  Vitals:    02/25/19 1421   BP: 133/79   Resp: 16   SpO2: 95%         Electronically Signed By: BETTY Fernández CRNA  February 25, 2019  4:52 PM

## 2019-02-25 NOTE — ANESTHESIA PREPROCEDURE EVALUATION
Anesthesia Pre-Procedure Evaluation    Patient: Eliseo Davis   MRN: [id number] : 1960          Preoperative Diagnosis: GERD with esophagitis    Procedure(s):  COMBINED ESOPHAGOSCOPY, GASTROSCOPY, DUODENOSCOPY (EGD)    Past Medical History:   Diagnosis Date     CARDIOVASCULAR SCREENING; LDL GOAL LESS THAN 130 2013     Uncomplicated asthma      Past Surgical History:   Procedure Laterality Date     CORONARY ANGIOGRAPHY ADULT ORDER  2017       Anesthesia Evaluation     . Pt has had prior anesthetic. Type: MAC and General    No history of anesthetic complications          ROS/MED HX    ENT/Pulmonary:     (+)tobacco use, Past use 35 pack year hx - quit 2017 packs/day  Moderate Persistent asthma Treatment: Inhaler daily and Nebulizer prn,  mild COPD, , . Other pulmonary disease Pulmonary nodule.    Neurologic:       Cardiovascular:     (+) hypertension----. : . . . :. .       METS/Exercise Tolerance:     Hematologic:         Musculoskeletal:  - neg musculoskeletal ROS       GI/Hepatic:     (+) GERD       Renal/Genitourinary:         Endo:     (+) Obesity, .      Psychiatric:         Infectious Disease:         Malignancy:         Other:                          Physical Exam  Normal systems: cardiovascular, pulmonary and dental    Airway   Mallampati: II  TM distance: >3 FB    Dental     Cardiovascular   Rhythm and rate: regular and normal  (-) no murmur    Pulmonary    breath sounds clear to auscultation    Other findings: Last dose Lisinopril was        Lab Results   Component Value Date    WBC 12.4 (H) 2019    HGB 14.3 2019    HCT 42.8 2019     2019    CRP 14.4 (H) 2017    SED 9 2004     2019    POTASSIUM 3.4 2019    CHLORIDE 105 2019    CO2 26 2019    BUN 17 2019    CR 1.29 (H) 2019     (H) 2019    LAUREN 8.7 2019    ALBUMIN 3.6 2019    PROTTOTAL 7.3 2019    ALT 42 2019    AST 24  "02/16/2019    ALKPHOS 111 02/16/2019    BILITOTAL 0.4 02/16/2019    LIPASE 198 02/16/2019    TSH 0.46 06/22/2017       Preop Vitals  BP Readings from Last 3 Encounters:   02/16/19 125/75   06/19/18 128/80   06/11/18 (!) 142/94    Pulse Readings from Last 3 Encounters:   02/16/19 106   06/19/18 72   06/21/17 112      Resp Readings from Last 3 Encounters:   02/16/19 18   06/19/18 20   06/23/17 20    SpO2 Readings from Last 3 Encounters:   02/16/19 93%   06/19/18 97%   06/11/18 93%      Temp Readings from Last 1 Encounters:   02/20/19 97.8  F (36.6  C) (Temporal)    Ht Readings from Last 1 Encounters:   02/20/19 1.746 m (5' 8.75\")      Wt Readings from Last 1 Encounters:   02/20/19 110.7 kg (244 lb)    Estimated body mass index is 36.3 kg/m  as calculated from the following:    Height as of 2/20/19: 1.746 m (5' 8.75\").    Weight as of 2/20/19: 110.7 kg (244 lb).       Anesthesia Plan      History & Physical Review  History and physical reviewed and following examination; no interval change.    ASA Status:  2 .    NPO Status:  > 6 hours    Plan for MAC with Intravenous and Propofol induction. Maintenance will be TIVA.  Reason for MAC:  Deep or markedly invasive procedure (G8) and Procedure to face, neck, head or breast  PONV prophylaxis:  Ondansetron (or other 5HT-3)       Postoperative Care      Consents  Anesthetic plan, risks, benefits and alternatives discussed with:  Patient.  Use of blood products discussed: No .   .                 BETTY Fernández CRNA  "

## 2019-02-25 NOTE — ANESTHESIA CARE TRANSFER NOTE
Patient: Eliseo Davis    Procedure(s):  COMBINED ESOPHAGOSCOPY, GASTROSCOPY, DUODENOSCOPY (EGD), BIOPSY SINGLE OR MULTIPLE    Diagnosis: GERD with esophagitis  Diagnosis Additional Information: No value filed.    Anesthesia Type:   MAC     Note:  Airway :Room Air  Patient transferred to:Phase II  Handoff Report: Identifed the Patient, Identified the Reponsible Provider, Reviewed the pertinent medical history, Discussed the surgical course, Reviewed Intra-OP anesthesia mangement and issues during anesthesia, Set expectations for post-procedure period and Allowed opportunity for questions and acknowledgement of understanding      Vitals: (Last set prior to Anesthesia Care Transfer)    CRNA VITALS  2/25/2019 1545 - 2/25/2019 1645      2/25/2019             SpO2:  94 %                Electronically Signed By: BETTY Fernández CRNA  February 25, 2019  4:51 PM

## 2019-02-27 LAB — COPATH REPORT: NORMAL

## 2019-04-10 ENCOUNTER — OFFICE VISIT (OUTPATIENT)
Dept: SURGERY | Facility: CLINIC | Age: 59
End: 2019-04-10
Payer: COMMERCIAL

## 2019-04-10 VITALS
HEIGHT: 69 IN | TEMPERATURE: 98.9 F | SYSTOLIC BLOOD PRESSURE: 134 MMHG | BODY MASS INDEX: 38.06 KG/M2 | WEIGHT: 257 LBS | DIASTOLIC BLOOD PRESSURE: 88 MMHG

## 2019-04-10 DIAGNOSIS — K21.9 GASTROESOPHAGEAL REFLUX DISEASE, ESOPHAGITIS PRESENCE NOT SPECIFIED: ICD-10-CM

## 2019-04-10 DIAGNOSIS — K22.70 BARRETT'S ESOPHAGUS WITHOUT DYSPLASIA: Primary | ICD-10-CM

## 2019-04-10 PROCEDURE — 99213 OFFICE O/P EST LOW 20 MIN: CPT | Performed by: SURGERY

## 2019-04-10 RX ORDER — OMEPRAZOLE 40 MG/1
40 CAPSULE, DELAYED RELEASE ORAL
Qty: 60 CAPSULE | Refills: 0 | Status: SHIPPED | OUTPATIENT
Start: 2019-04-10

## 2019-04-10 ASSESSMENT — PAIN SCALES - GENERAL: PAINLEVEL: NO PAIN (0)

## 2019-04-10 ASSESSMENT — MIFFLIN-ST. JEOR: SCORE: 1977.15

## 2019-04-10 NOTE — PROGRESS NOTES
General Surgery Follow Up    Pt returns for follow up visit after EGD    HPI:  Eliseo returns to discuss EGD and pathology findings. He did have Olivera's esophagus on pathology. We discussed what this means and how to treat it. He was started on omeprazole just prior to his EGD and he states that this eliminated his symptoms. He denies any pain or dysphagia.    Review Of Systems    Skin: negative  Ears/Nose/Throat: negative  Respiratory: No shortness of breath, dyspnea on exertion, cough, or hemoptysis  Cardiovascular: negative  Gastrointestinal: negative  Genitourinary: negative  Musculoskeletal: negative  Neurologic: negative  Hematologic/Lymphatic/Immunologic: negative  Endocrine: negative      Past Medical History:   Diagnosis Date     CARDIOVASCULAR SCREENING; LDL GOAL LESS THAN 130 9/25/2013     Uncomplicated asthma        Past Surgical History:   Procedure Laterality Date     CORONARY ANGIOGRAPHY ADULT ORDER  06/22/2017     ESOPHAGOSCOPY, GASTROSCOPY, DUODENOSCOPY (EGD), COMBINED N/A 2/25/2019    Procedure: COMBINED ESOPHAGOSCOPY, GASTROSCOPY, DUODENOSCOPY (EGD), BIOPSY SINGLE OR MULTIPLE;  Surgeon: Emmanuel De La Fuente DO;  Location:  GI       Social History     Socioeconomic History     Marital status: Single     Spouse name: Not on file     Number of children: Not on file     Years of education: Not on file     Highest education level: Not on file   Occupational History     Not on file   Social Needs     Financial resource strain: Not on file     Food insecurity:     Worry: Not on file     Inability: Not on file     Transportation needs:     Medical: Not on file     Non-medical: Not on file   Tobacco Use     Smoking status: Former Smoker     Packs/day: 1.00     Years: 35.00     Pack years: 35.00     Types: Cigarettes     Smokeless tobacco: Former User     Quit date: 1/5/2017   Substance and Sexual Activity     Alcohol use: No     Comment: 2 drinks yesterday     Drug use: Yes     Types: Marijuana      Comment: 2-3 tiomes a year     Sexual activity: Not Currently   Lifestyle     Physical activity:     Days per week: Not on file     Minutes per session: Not on file     Stress: Not on file   Relationships     Social connections:     Talks on phone: Not on file     Gets together: Not on file     Attends Sikhism service: Not on file     Active member of club or organization: Not on file     Attends meetings of clubs or organizations: Not on file     Relationship status: Not on file     Intimate partner violence:     Fear of current or ex partner: Not on file     Emotionally abused: Not on file     Physically abused: Not on file     Forced sexual activity: Not on file   Other Topics Concern     Parent/sibling w/ CABG, MI or angioplasty before 65F 55M? Not Asked   Social History Narrative     Not on file       Current Outpatient Medications   Medication Sig Dispense Refill     omeprazole (PRILOSEC) 40 MG DR capsule Take 1 capsule (40 mg) by mouth 2 times daily (before meals) 60 capsule 0     acetaminophen (TYLENOL) 500 MG tablet Take 2 tablets (1,000 mg) by mouth every 6 hours as needed for mild pain       albuterol (PROAIR HFA/PROVENTIL HFA/VENTOLIN HFA) 108 (90 BASE) MCG/ACT Inhaler Inhale 2 puffs into the lungs 4 times daily For 5 days, then use 2 puffs every 4 hours as needed (Patient taking differently: Inhale 1-2 puffs into the lungs every 4 hours as needed For 5 days, then use 2 puffs every 4 hours as needed) 1 Inhaler 0     aspirin EC 81 MG EC tablet Take 1 tablet (81 mg) by mouth daily 30 tablet 3     atorvastatin (LIPITOR) 40 MG tablet Take 1 tablet (40 mg) by mouth daily 30 tablet 0     budesonide-formoterol (SYMBICORT) 80-4.5 MCG/ACT Inhaler Inhale 2 puffs into the lungs 2 times daily       diphenhydrAMINE (BENADRYL) 50 MG capsule Take 1 capsule (50 mg) by mouth every 6 hours as needed for itching 56 capsule      ipratropium - albuterol 0.5 mg/2.5 mg/3 mL (DUONEB) 0.5-2.5 (3) MG/3ML neb solution Take 1  "vial (3 mLs) by nebulization every 4 hours as needed for shortness of breath / dyspnea or wheezing 75 mL 0     lisinopril (PRINIVIL/ZESTRIL) 5 MG tablet Take 1 tablet (5 mg) by mouth daily 30 tablet 0       Medications and history reviewed    Physical exam:  Vitals: /88   Temp 98.9  F (37.2  C)   Ht 1.746 m (5' 8.75\")   Wt 116.6 kg (257 lb)   BMI 38.23 kg/m    BMI= Body mass index is 38.23 kg/m .    Constitutional: Healthy, alert, non-distressed   Head: Normo-cephalic, atraumatic, no lesions, masses or tenderness   Cardiovascular: RRR, no new murmurs, +S1, +S2 heart sounds, no clicks, rubs or gallops   Respiratory: CTAB, no rales, rhonchi or wheezing, equal chest rise, good respiratory effort   Gastrointestinal: Soft, non-tender, non distended, no rebound rigidity or guarding, no masses or hernias palpated   : Deferred  Musculoskeletal: Moves all extremities, normal  strength, no deformities noted   Skin: No suspicious lesions or rashes   Psychiatric: Mentation appears normal, affect appropriate   Hematologic/Lymphatic/Immunologic: Normal cervical and supraclavicular lymph nodes   Patient able to get up on table without difficulty.      Labs show:  FINAL DIAGNOSIS:   A. Duodenum, biopsy:   - Heterotopic gastric tissue, Brunner gland hyperplasia, chronic   inflammation and reactive epithelial changes.     B. Gastroesophageal junction, biopsy:   - Olivera's specialized mucosa   - No evidence of dysplasia or malignancy  - Stratified squamous epithelium with mild reactive changes, nonspecific.   - Chronic inflammation with reactive epithelial changes.    Assessment:     ICD-10-CM    1. Olivera's esophagus without dysplasia K22.70    2. Gastroesophageal reflux disease, esophagitis presence not specified K21.9 omeprazole (PRILOSEC) 40 MG DR capsule     Plan: I recommended taking omeprazole indefinitely. He will need surveillance upper endoscopy in 3 years. I refilled his prescription for omeprazole today, " but recommended he follow up with his PCP for subsequent orders. We did discuss nissen fundoplication with hiatal hernia repair also as a treatment option, but at this time with resolution of symptoms with medication alone, he does not want to consider surgery at this time.    Emmanuel De La Fuente, DO

## 2019-05-05 ENCOUNTER — HOSPITAL ENCOUNTER (EMERGENCY)
Facility: CLINIC | Age: 59
Discharge: HOME | End: 2019-05-05
Attending: EMERGENCY MEDICINE | Admitting: EMERGENCY MEDICINE
Payer: COMMERCIAL

## 2019-05-05 VITALS
TEMPERATURE: 97.6 F | DIASTOLIC BLOOD PRESSURE: 86 MMHG | WEIGHT: 250 LBS | RESPIRATION RATE: 18 BRPM | SYSTOLIC BLOOD PRESSURE: 142 MMHG | OXYGEN SATURATION: 97 % | BODY MASS INDEX: 37.19 KG/M2 | HEART RATE: 77 BPM

## 2019-05-05 DIAGNOSIS — S39.012A ACUTE MYOFASCIAL STRAIN OF LUMBAR REGION, INITIAL ENCOUNTER: ICD-10-CM

## 2019-05-05 LAB
ALBUMIN UR-MCNC: NEGATIVE MG/DL
APPEARANCE UR: CLEAR
BILIRUB UR QL STRIP: NEGATIVE
COLOR UR AUTO: YELLOW
GLUCOSE UR STRIP-MCNC: NEGATIVE MG/DL
HGB UR QL STRIP: NEGATIVE
KETONES UR STRIP-MCNC: NEGATIVE MG/DL
LEUKOCYTE ESTERASE UR QL STRIP: NEGATIVE
NITRATE UR QL: NEGATIVE
PH UR STRIP: 8 PH (ref 5–7)
RBC #/AREA URNS AUTO: <1 /HPF (ref 0–2)
SOURCE: ABNORMAL
SP GR UR STRIP: 1 (ref 1–1.03)
UROBILINOGEN UR STRIP-MCNC: 0 MG/DL (ref 0–2)
WBC #/AREA URNS AUTO: <1 /HPF (ref 0–5)

## 2019-05-05 PROCEDURE — 99283 EMERGENCY DEPT VISIT LOW MDM: CPT | Mod: 25

## 2019-05-05 PROCEDURE — 81001 URINALYSIS AUTO W/SCOPE: CPT | Performed by: EMERGENCY MEDICINE

## 2019-05-05 PROCEDURE — 25000128 H RX IP 250 OP 636: Performed by: EMERGENCY MEDICINE

## 2019-05-05 PROCEDURE — 96372 THER/PROPH/DIAG INJ SC/IM: CPT

## 2019-05-05 PROCEDURE — 99283 EMERGENCY DEPT VISIT LOW MDM: CPT | Mod: Z6 | Performed by: EMERGENCY MEDICINE

## 2019-05-05 PROCEDURE — 99284 EMERGENCY DEPT VISIT MOD MDM: CPT

## 2019-05-05 RX ORDER — KETOROLAC TROMETHAMINE 30 MG/ML
30 INJECTION, SOLUTION INTRAMUSCULAR; INTRAVENOUS ONCE
Status: COMPLETED | OUTPATIENT
Start: 2019-05-05 | End: 2019-05-05

## 2019-05-05 RX ORDER — HYDROCODONE BITARTRATE AND ACETAMINOPHEN 5; 325 MG/1; MG/1
1 TABLET ORAL EVERY 4 HOURS PRN
Qty: 12 TABLET | Refills: 0 | Status: SHIPPED | OUTPATIENT
Start: 2019-05-05 | End: 2019-07-31

## 2019-05-05 RX ORDER — CYCLOBENZAPRINE HCL 10 MG
10 TABLET ORAL 3 TIMES DAILY PRN
Qty: 15 TABLET | Refills: 0 | Status: SHIPPED | OUTPATIENT
Start: 2019-05-05 | End: 2019-07-31

## 2019-05-05 RX ADMIN — KETOROLAC TROMETHAMINE 30 MG: 30 INJECTION INTRAMUSCULAR; INTRAVENOUS at 08:35

## 2019-05-05 ASSESSMENT — ENCOUNTER SYMPTOMS
SHORTNESS OF BREATH: 0
ABDOMINAL PAIN: 0
FEVER: 0

## 2019-05-05 NOTE — DISCHARGE INSTRUCTIONS
Evaluation of your left flank low back pain is consistent with muscle ligament discomfort.  He had active spasm in the left low back that restricting range of motion.  This consistent with your pain being worse when you are up moving bending and twisting.  Recommend she apply ice to low back for 30 minutes 4 times daily.  Do gentle stretching after icing.  Flexeril as a muscle relaxant.  Can be taken 3 times daily as needed for spasm.  It can cause drowsiness.  Recommend ibuprofen or Tylenol for mild to moderate pain.  A prescription is been given for hydrocodone.  This is narcotic.  It is to be used only for severe pain.  Can cause drowsiness, constipation.  Do not mix with alcohol.  If your low back discomfort is not improving over the next week would recommend follow-up with your primary clinic provider for reassessment and possible referral to physical therapy.  Return to the emergency department if you notice any new associated symptoms.

## 2019-05-05 NOTE — ED AVS SNAPSHOT
[facility] Emergency Department  [address] [place] [address]  Phone:  [phone]  Fax:  [phone]                                    Eliseo Davis   MRN: [id number]    Department:  [facility] Emergency Department   Date of Visit:  5/5/2019           After Visit Summary Signature Page    I have received my discharge instructions, and my questions have been answered. I have discussed any challenges I see with this plan with the nurse or doctor.    ..........................................................................................................................................  Patient/Patient Representative Signature      ..........................................................................................................................................  Patient Representative Print Name and Relationship to Patient    ..................................................               ................................................  Date                                   Time    ..........................................................................................................................................  Reviewed by Signature/Title    ...................................................              ..............................................  Date                                               Time          22EPIC Rev 08/18

## 2019-05-05 NOTE — ED PROVIDER NOTES
History     Chief Complaint   Patient presents with     Flank Pain     HPI  Eliseo Davis is a 58 year old male who presents with left low back and flank pain.  Onset yesterday morning.  Came on gradually during normal morning activities.  Was not instantaneous.  He has had no previous renal colic.  Is noted no gross hematuria.  Denies fever, chills.  He reports no specific activity such as lifting or bending that could have provoked low back pain.  He is having no abdominal pain.  No change in appetite.  He has noted no rash to suggest zoster.  Rates his low back pain 8/10 intensity.  When he is sitting still he has minimal to no pain is only when he is up and moving, bending, twisting.  He has no referred pain into either lower extremity.    Allergies:  Allergies   Allergen Reactions     Penicillins Anaphylaxis     Dust Mites      Pollen Extract        Problem List:    Patient Active Problem List    Diagnosis Date Noted     Atypical chest pain 02/16/2019     Priority: Medium     COPD (chronic obstructive pulmonary disease) (H) 02/16/2019     Priority: Medium     Hyperlipidemia LDL goal <100 02/16/2019     Priority: Medium     Gastroesophageal reflux disease 02/16/2019     Priority: Medium     Tachycardia 02/16/2019     Priority: Medium     ACS (acute coronary syndrome) (H) 06/22/2017     Priority: Medium     Urticaria 06/13/2017     Priority: Medium     Neutrophilic leukocytosis 06/12/2017     Priority: Medium     Pulmonary nodule - 5 mm RUL (high risk pt) 06/12/2017     Priority: Medium     Acute respiratory failure with hypoxia (H) 06/11/2017     Priority: Medium     Reactive airway disease with wheezing with acute exacerbation 06/11/2017     Priority: Medium     Obesity 06/11/2017     Priority: Medium     CARDIOVASCULAR SCREENING; LDL GOAL LESS THAN 130 09/25/2013     Priority: Medium     Thoracic back pain 09/25/2013     Priority: Medium     Back muscle spasm 09/25/2013     Priority: Medium        Past Medical  History:    Past Medical History:   Diagnosis Date     CARDIOVASCULAR SCREENING; LDL GOAL LESS THAN 130 9/25/2013     Uncomplicated asthma        Past Surgical History:    Past Surgical History:   Procedure Laterality Date     CORONARY ANGIOGRAPHY ADULT ORDER  06/22/2017     ESOPHAGOSCOPY, GASTROSCOPY, DUODENOSCOPY (EGD), COMBINED N/A 2/25/2019    Procedure: COMBINED ESOPHAGOSCOPY, GASTROSCOPY, DUODENOSCOPY (EGD), BIOPSY SINGLE OR MULTIPLE;  Surgeon: Emmanuel De La Fuente DO;  Location:  GI       Family History:    Family History   Problem Relation Age of Onset     Cancer Mother      Cancer Father        Social History:  Marital Status:  Single [1]  Social History     Tobacco Use     Smoking status: Former Smoker     Packs/day: 1.00     Years: 35.00     Pack years: 35.00     Types: Cigarettes     Smokeless tobacco: Former User     Quit date: 1/5/2017   Substance Use Topics     Alcohol use: No     Comment: 2 drinks yesterday     Drug use: Yes     Types: Marijuana     Comment: 2-3 tiomes a year        Medications:      acetaminophen (TYLENOL) 500 MG tablet   albuterol (PROAIR HFA/PROVENTIL HFA/VENTOLIN HFA) 108 (90 BASE) MCG/ACT Inhaler   aspirin EC 81 MG EC tablet   atorvastatin (LIPITOR) 40 MG tablet   budesonide-formoterol (SYMBICORT) 80-4.5 MCG/ACT Inhaler   diphenhydrAMINE (BENADRYL) 50 MG capsule   ipratropium - albuterol 0.5 mg/2.5 mg/3 mL (DUONEB) 0.5-2.5 (3) MG/3ML neb solution   lisinopril (PRINIVIL/ZESTRIL) 5 MG tablet   omeprazole (PRILOSEC) 40 MG DR capsule         Review of Systems   Constitutional: Negative for fever.   Respiratory: Negative for shortness of breath.    Cardiovascular: Negative for chest pain.   Gastrointestinal: Negative for abdominal pain.   All other systems reviewed and are negative.      Physical Exam   BP: 142/86  Pulse: 77  Temp: 97.6  F (36.4  C)  Resp: 18  Weight: 113.4 kg (250 lb)  SpO2: 97 %      Physical Exam  Vital signs reviewed.  Nursing notes reviewed.  Patient was  observed to be walking the mccallum.  He was forward flex and slightly side bent to the right.  He was slow to move from a sitting to standing position.  When he would sit he would use his arms in a tripod position to alleviate some of the back discomfort.  The patient had left paralumbar muscle spasm.  Very tender in the region of the left PSIS as well as into the left SI joint.  Very limited range of motion for flexion and extension due to discomfort.  Straight leg raise test was negative.  DTRs lower extremity symmetric.  The low back flank area did not show any rash concerning for zoster.    ED Course        Procedures                   Results for orders placed or performed during the hospital encounter of 05/05/19 (from the past 24 hour(s))   UA with Microscopic   Result Value Ref Range    Color Urine Yellow     Appearance Urine Clear     Glucose Urine Negative NEG^Negative mg/dL    Bilirubin Urine Negative NEG^Negative    Ketones Urine Negative NEG^Negative mg/dL    Specific Gravity Urine 1.005 1.003 - 1.035    Blood Urine Negative NEG^Negative    pH Urine 8.0 (H) 5.0 - 7.0 pH    Protein Albumin Urine Negative NEG^Negative mg/dL    Urobilinogen mg/dL 0.0 0.0 - 2.0 mg/dL    Nitrite Urine Negative NEG^Negative    Leukocyte Esterase Urine Negative NEG^Negative    Source Midstream Urine     WBC Urine <1 0 - 5 /HPF    RBC Urine <1 0 - 2 /HPF       Medications   ketorolac (TORADOL) injection 30 mg (has no administration in time range)       Assessments & Plan (with Medical Decision Making)  58-year-old male presents with left low back pain.  No known acute injury.  No history for renal colic.  Discomfort came on gradual as of yesterday morning.  Mechanically he states discomfort is much worse when he is up walking, moving, bending or twisting.  He has noted no hematuria.  No fever or chills.  No rash.    Examination noted patient to be held in a forward flexed - side bent and rotated position due to acute paralumbar  muscle spasm.  This greatly restricted his range of motion.  He had no radicular concerns.  His abdomen is obese but not tender.  Did not identify any concerns for mid abdomen bruit.  Presentation appears to be consistent with myofascial type pain not visceral.    Plan: Patient be discharged home on Flexeril, ibuprofen and Norco.  To recheck his primary care provider next week if not improving consider physical therapy.  Patient received Toradol 30 mg IM before discharge.                  Medication List      There are no discharge medications for this visit.         Final diagnoses:   Acute myofascial strain of lumbar region, initial encounter       5/5/2019   [facility] EMERGENCY DEPARTMENT     Oniel Hines,   05/05/19 0836

## 2019-05-05 NOTE — ED TRIAGE NOTES
Patient here with left sided flank pain that started yesterday morning. Patient states it does not feel like muscle pains he has had in the past.

## 2019-07-25 ENCOUNTER — TRANSFERRED RECORDS (OUTPATIENT)
Dept: HEALTH INFORMATION MANAGEMENT | Facility: CLINIC | Age: 59
End: 2019-07-25

## 2019-07-31 ENCOUNTER — OFFICE VISIT (OUTPATIENT)
Dept: FAMILY MEDICINE | Facility: OTHER | Age: 59
End: 2019-07-31
Payer: COMMERCIAL

## 2019-07-31 VITALS
RESPIRATION RATE: 18 BRPM | OXYGEN SATURATION: 99 % | BODY MASS INDEX: 35.92 KG/M2 | WEIGHT: 241.5 LBS | HEART RATE: 99 BPM | DIASTOLIC BLOOD PRESSURE: 78 MMHG | SYSTOLIC BLOOD PRESSURE: 130 MMHG | TEMPERATURE: 97.6 F

## 2019-07-31 DIAGNOSIS — J44.9 CHRONIC OBSTRUCTIVE PULMONARY DISEASE, UNSPECIFIED COPD TYPE (H): ICD-10-CM

## 2019-07-31 DIAGNOSIS — E66.01 MORBID OBESITY (H): ICD-10-CM

## 2019-07-31 DIAGNOSIS — J20.9 ACUTE BRONCHITIS WITH SYMPTOMS > 10 DAYS: Primary | ICD-10-CM

## 2019-07-31 PROBLEM — J96.01 ACUTE RESPIRATORY FAILURE WITH HYPOXIA (H): Status: RESOLVED | Noted: 2017-06-11 | Resolved: 2019-07-31

## 2019-07-31 PROCEDURE — 99214 OFFICE O/P EST MOD 30 MIN: CPT | Performed by: FAMILY MEDICINE

## 2019-07-31 RX ORDER — AZITHROMYCIN 250 MG/1
TABLET, FILM COATED ORAL
Qty: 6 TABLET | Refills: 0 | Status: SHIPPED | OUTPATIENT
Start: 2019-07-31 | End: 2019-11-21

## 2019-07-31 RX ORDER — CETIRIZINE HYDROCHLORIDE 10 MG/1
10 TABLET ORAL DAILY
Qty: 30 TABLET | Refills: 0 | Status: SHIPPED | OUTPATIENT
Start: 2019-07-31 | End: 2020-12-31

## 2019-07-31 RX ORDER — PREDNISONE 20 MG/1
40 TABLET ORAL DAILY
Qty: 20 TABLET | Refills: 0 | Status: SHIPPED | OUTPATIENT
Start: 2019-07-31 | End: 2019-11-21

## 2019-07-31 ASSESSMENT — PAIN SCALES - GENERAL: PAINLEVEL: NO PAIN (0)

## 2019-07-31 NOTE — PROGRESS NOTES
Subjective     Eliseo Davis is a 58 year old male who presents to clinic today for the following health issues:    HPI   Acute Illness   Acute illness concerns: sinus  Onset: 2 weeks     Fever: YES    Chills/Sweats: YES    Headache (location?): no    Sinus Pressure:YES    Conjunctivitis:  YES: both    Ear Pain: YES: both    Rhinorrhea: no    Congestion: YES    Sore Throat: no     Cough: YES-productive of clear sputum    Wheeze: YES    Decreased Appetite: YES    Nausea: no    Vomiting: no    Diarrhea:  no    Dysuria/Freq.: no    Fatigue/Achiness: YES    Sick/Strep Exposure: no     Therapies Tried and outcome: n/a    Eliseo with COPD and a history of respiratory failure with hypoxemia presented today with concern of sinus infection.  He been sick for about 2 weeks and it is getting worse.  Nursing notes above reviewed and confirmed with patient.  No headache or dizziness.  He has had the runny nose with nasal congestion.  Been using the Flonase which has helped.  No sneezing.  A lot of pressure in the sinuses but no pain.  No teeth sensitivity.  Been coughing which worse at night and with exertion.  Wheezing with chest tightness sensation with coughing and exertion as well.  Known to have COPD.  Been using the rescue inhaler and neb treatment neb treatment more than usual, 3-4 times a day which has helped.  Feels shortness of breath with exertion but no orthopnea.  No longer smoking.  No chest pain.  No leg swelling or orthopnea.  [facility]  No other concerns today.    Current Outpatient Medications   Medication Sig Dispense Refill     albuterol (PROAIR HFA/PROVENTIL HFA/VENTOLIN HFA) 108 (90 BASE) MCG/ACT Inhaler Inhale 2 puffs into the lungs 4 times daily For 5 days, then use 2 puffs every 4 hours as needed (Patient taking differently: Inhale 1-2 puffs into the lungs every 4 hours as needed For 5 days, then use 2 puffs every 4 hours as needed) 1  Inhaler 0     aspirin EC 81 MG EC tablet Take 1 tablet (81 mg) by mouth daily 30 tablet 3     atorvastatin (LIPITOR) 40 MG tablet Take 1 tablet (40 mg) by mouth daily 30 tablet 0     azithromycin (ZITHROMAX) 250 MG tablet Take 2 tablets (500 mg) by mouth daily for 1 day, THEN 1 tablet (250 mg) daily for 4 days. 6 tablet 0     budesonide-formoterol (SYMBICORT) 80-4.5 MCG/ACT Inhaler Inhale 2 puffs into the lungs 2 times daily       cetirizine (ZYRTEC) 10 MG tablet Take 1 tablet (10 mg) by mouth daily 30 tablet 0     ipratropium - albuterol 0.5 mg/2.5 mg/3 mL (DUONEB) 0.5-2.5 (3) MG/3ML neb solution Take 1 vial (3 mLs) by nebulization every 4 hours as needed for shortness of breath / dyspnea or wheezing 75 mL 0     lisinopril (PRINIVIL/ZESTRIL) 5 MG tablet Take 1 tablet (5 mg) by mouth daily 30 tablet 0     omeprazole (PRILOSEC) 40 MG DR capsule Take 1 capsule (40 mg) by mouth 2 times daily (before meals) 60 capsule 0     predniSONE (DELTASONE) 20 MG tablet Take 2 tablets (40 mg) by mouth daily for 10 days 20 tablet 0     Allergies   Allergen Reactions     Penicillins Anaphylaxis     Dust Mites      Pollen Extract        Reviewed and updated as needed this visit by Provider         Review of Systems   ROS COMP: Constitutional, HEENT, cardiovascular, pulmonary, gi and gu systems are negative, except as otherwise noted.      Objective    /78   Pulse 99   Temp 97.6  F (36.4  C) (Temporal)   Resp 18   Wt 109.5 kg (241 lb 8 oz)   SpO2 99%   BMI 35.92 kg/m    Body mass index is 35.92 kg/m .  Physical Exam   GENERAL: healthy, alert and no distress.  Speaking in full sentences.  HENT: ear canals and TM's normal, Nares are congested. Oropharynx is pink and moist. No tender with palpation to the sinuses.  NECK: no adenopathy, supple, no lymphadenopathy.  RESP: Expiratory wheezing appreciated.  No crackle.  Good respiratory effort throughout.  CV: regular rate and rhythm, no murmur  ABDOMEN: soft, nontender,  "nondistended, normal bowel sounds.  MS: no gross musculoskeletal defects noted, no edema    Diagnostic Test Results:  Labs reviewed in Epic  No results found for this or any previous visit (from the past 24 hour(s)).        Assessment & Plan       ICD-10-CM    1. Acute bronchitis with symptoms > 10 days J20.9 azithromycin (ZITHROMAX) 250 MG tablet     predniSONE (DELTASONE) 20 MG tablet     cetirizine (ZYRTEC) 10 MG tablet   2. Chronic obstructive pulmonary disease, unspecified COPD type (H) J44.9 azithromycin (ZITHROMAX) 250 MG tablet     predniSONE (DELTASONE) 20 MG tablet   3. Morbid obesity (H) E66.01      Acute bronchitis with mild COPD exacerbation.  Vital signs are stable and normal O2 sat. has a history of respiratory failure with hypoxemia due to COPD exacerbation; never been intubated. Been sick for 2 weeks and his symptoms are worsening.  Discussed with him about the nature of the condition.  Recommend to continue with the inhalers and neb treatment as need for SOB, wheezing or coughing.  Continue with the Flonase and started him on Zyrtec for nasal congestion.  Also started him on Zithromax and prednisone.  Encouraged to drink a lot of water and to ensure adequate resting. Call in or follow up if the symptoms are not improve worse. ER if develop fever, breathing difficulty or has any concern. He understands and all of his questions were answered.     BMI:   Estimated body mass index is 35.92 kg/m  as calculated from the following:    Height as of 4/10/19: 1.746 m (5' 8.75\").    Weight as of this encounter: 109.5 kg (241 lb 8 oz).   Weight management plan: Patient was referred to their PCP to discuss a diet and exercise plan.        Return in about 1 month (around 8/31/2019) for Physical Exam.    Raciel Valverde Mai, MD  [facility]"

## 2019-07-31 NOTE — LETTER
My Asthma Action Plan  Name: Eliseo Davis   YOB: 1960  Date: 7/31/2019   My doctor: Raciel Valverde Mai, MD   My clinic: [facility]        My Control Medicine: None  My Rescue Medicine: Albuterol nebulizer solution as needed  Albuterol (Proair/Ventolin/Proventil) inhaler as needed   My Asthma Severity: mild persistent  Avoid your asthma triggers: strong odors and fumes               GREEN ZONE   Good Control    I feel good    No cough or wheeze    Can work, sleep and play without asthma symptoms       Take your asthma control medicine every day.     1. If exercise triggers your asthma, take your rescue medication    15 minutes before exercise or sports, and    During exercise if you have asthma symptoms  2. Spacer to use with inhaler: If you have a spacer, make sure to use it with your inhaler             YELLOW ZONE Getting Worse  I have ANY of these:    I do not feel good    Cough or wheeze    Chest feels tight    Wake up at night   1. Keep taking your Green Zone medications  2. Start taking your rescue medicine:    every 20 minutes for up to 1 hour. Then every 4 hours for 24-48 hours.  3. If you stay in the Yellow Zone for more than 12-24 hours, contact your doctor.  4. If you do not return to the Green Zone in 12-24 hours or you get worse, start taking your oral steroid medicine if prescribed by your provider.           RED ZONE Medical Alert - Get Help  I have ANY of these:    I feel awful    Medicine is not helping    Breathing getting harder    Trouble walking or talking    Nose opens wide to breathe       1. Take your rescue medicine NOW  2. If your provider has prescribed an oral steroid medicine, start taking it NOW  3. Call your doctor NOW  4. If you are still in the Red Zone after 20 minutes and you have not reached your doctor:    Take your rescue medicine again and    Call 911 or go to the emergency room right away    See your regular doctor within 2 weeks of an Emergency Room or  Urgent Care visit for follow-up treatment.          Annual Reminders:  Meet with Asthma Educator,  Flu Shot in the Fall, consider Pneumonia Vaccination for patients with asthma (aged 19 and older).    Pharmacy:    [facility], [place] - [address] [place] [address] PHARMACY  [facility] - [place], [place] - [address]                      Asthma Triggers  How To Control Things That Make Your Asthma Worse    Triggers are things that make your asthma worse.  Look at the list below to help you find your triggers and what you can do about them.  You can help prevent asthma flare-ups by staying away from your triggers.      Trigger                                                          What you can do   Cigarette Smoke  Tobacco smoke can make asthma worse. Do not allow smoking in your home, car or around you.  Be sure no one smokes at a child s day care or school.  If you smoke, ask your health care provider for ways to help you quit.  Ask family members to quit too.  Ask your health care provider for a referral to Quit Plan to help you quit smoking, or call 5-408-025-PLAN.     Colds, Flu, Bronchitis  These are common triggers of asthma. Wash your hands often.  Don t touch your eyes, nose or mouth.  Get a flu shot every year.     Dust Mites  These are tiny bugs that live in cloth or carpet. They are too small to see. Wash sheets and blankets in hot water every week.   Encase pillows and mattress in dust mite proof covers.  Avoid having carpet if you can. If you have carpet, vacuum weekly.   Use a dust mask and HEPA vacuum.   Pollen and Outdoor Mold  Some people are allergic to trees, grass, or weed pollen, or molds. Try to keep your windows closed.  Limit time out doors when pollen count is high.   Ask you health care provider about taking medicine during allergy season.     Animal Dander  Some people are allergic to skin flakes, urine or saliva from pets with fur  or feathers. Keep pets with fur or feathers out of your home.    If you can t keep the pet outdoors, then keep the pet out of your bedroom.  Keep the bedroom door closed.  Keep pets off cloth furniture and away from stuffed toys.     Mice, Rats, and Cockroaches  Some people are allergic to the waste from these pests.   Cover food and garbage.  Clean up spills and food crumbs.  Store grease in the refrigerator.   Keep food out of the bedroom.   Indoor Mold  This can be a trigger if your home has high moisture. Fix leaking faucets, pipes, or other sources of water.   Clean moldy surfaces.  Dehumidify basement if it is damp and smelly.   Smoke, Strong Odors, and Sprays  These can reduce air quality. Stay away from strong odors and sprays, such as perfume, powder, hair spray, paints, smoke incense, paint, cleaning products, candles and new carpet.   Exercise or Sports  Some people with asthma have this trigger. Be active!  Ask your doctor about taking medicine before sports or exercise to prevent symptoms.    Warm up for 5-10 minutes before and after sports or exercise.     Other Triggers of Asthma  Cold air:  Cover your nose and mouth with a scarf.  Sometimes laughing or crying can be a trigger.  Some medicines and food can trigger asthma.

## 2019-08-01 ASSESSMENT — ASTHMA QUESTIONNAIRES: ACT_TOTALSCORE: 8

## 2019-11-21 ENCOUNTER — OFFICE VISIT (OUTPATIENT)
Dept: FAMILY MEDICINE | Facility: OTHER | Age: 59
End: 2019-11-21
Payer: COMMERCIAL

## 2019-11-21 VITALS
BODY MASS INDEX: 37.99 KG/M2 | TEMPERATURE: 95.3 F | WEIGHT: 255.4 LBS | DIASTOLIC BLOOD PRESSURE: 80 MMHG | RESPIRATION RATE: 20 BRPM | SYSTOLIC BLOOD PRESSURE: 124 MMHG | HEART RATE: 72 BPM | OXYGEN SATURATION: 95 %

## 2019-11-21 DIAGNOSIS — J44.9 CHRONIC OBSTRUCTIVE PULMONARY DISEASE, UNSPECIFIED COPD TYPE (H): Primary | ICD-10-CM

## 2019-11-21 PROCEDURE — 99213 OFFICE O/P EST LOW 20 MIN: CPT | Performed by: PHYSICIAN ASSISTANT

## 2019-11-21 RX ORDER — ATORVASTATIN CALCIUM 80 MG/1
40 TABLET, FILM COATED ORAL DAILY
COMMUNITY

## 2019-11-21 RX ORDER — PREDNISONE 20 MG/1
20 TABLET ORAL 2 TIMES DAILY
Qty: 10 TABLET | Refills: 0 | Status: SHIPPED | OUTPATIENT
Start: 2019-11-21 | End: 2020-12-31

## 2019-11-21 RX ORDER — LISINOPRIL 10 MG/1
TABLET ORAL
COMMUNITY

## 2019-11-21 RX ORDER — FLUTICASONE PROPIONATE 50 MCG
1 SPRAY, SUSPENSION (ML) NASAL DAILY
COMMUNITY

## 2019-11-21 ASSESSMENT — ASTHMA QUESTIONNAIRES
QUESTION_4 LAST FOUR WEEKS HOW OFTEN HAVE YOU USED YOUR RESCUE INHALER OR NEBULIZER MEDICATION (SUCH AS ALBUTEROL): THREE OR MORE TIMES PER DAY
QUESTION_2 LAST FOUR WEEKS HOW OFTEN HAVE YOU HAD SHORTNESS OF BREATH: MORE THAN ONCE A DAY
QUESTION_5 LAST FOUR WEEKS HOW WOULD YOU RATE YOUR ASTHMA CONTROL: POORLY CONTROLLED
ACT_TOTALSCORE: 6
QUESTION_3 LAST FOUR WEEKS HOW OFTEN DID YOUR ASTHMA SYMPTOMS (WHEEZING, COUGHING, SHORTNESS OF BREATH, CHEST TIGHTNESS OR PAIN) WAKE YOU UP AT NIGHT OR EARLIER THAN USUAL IN THE MORNING: FOUR OR MORE NIGHTS A WEEK
QUESTION_1 LAST FOUR WEEKS HOW MUCH OF THE TIME DID YOUR ASTHMA KEEP YOU FROM GETTING AS MUCH DONE AT WORK, SCHOOL OR AT HOME: ALL OF THE TIME

## 2019-11-21 ASSESSMENT — PAIN SCALES - GENERAL: PAINLEVEL: NO PAIN (0)

## 2019-11-21 NOTE — PATIENT INSTRUCTIONS
Patient Education     What is COPD?  COPD stands for chronic obstructive pulmonary disease. It means the airways in your lungs are blocked (obstructed). Because of this, it is hard to breathe. You may have trouble with daily activities because of shortness of breath. Over time the shortness of breath usually worsens making it more and more difficult to take care of yourself and take part in activities. Chronic bronchitis and emphysema are two common types of COPD.  What happens in chronic bronchitis?    The cells in the airways make more mucus than normal. The mucus builds up, narrowing the airways. This means less air travels into and out of the lungs. The lining of the airways may also become inflamed (swollen) and causes the airways to narrow even more.        What happens in emphysema?    The small airways are damaged and lose their stretchiness. The airways collapse when you exhale, causing air to get trapped in the air sacs. This means that less oxygen enters the blood vessels and less oxygen is delivered to all of the cells of your body. This makes it hard to breathe.     Damage to cilia    Cilia are small hairs that line and protect the airways. Smoking damages the cilia. Damaged cilia can t sweep mucus and particles away. Some of the cilia are destroyed. This damage worsens COPD.  How did I get COPD?  Most people get COPD from smoking. Cigarette smoke damages lungs, which can develop into COPD over many years.  How COPD affects you  COPD makes you work harder to breathe. Air may get trapped in the lungs, which prevents your lungs from filling completely with fresh oxygen-filled air when you inhale (breathe in). It's harder to take deep breaths especially when you are active and start breathing faster. Over time, your lungs may become enlarged filling the lung with air that does not transfer oxygen into the blood. These problems cause you to have shortness of breath (also called dyspnea). Wheezing (hoarse,  whistling breathing), chronic cough, and fatigue (feeling tired and worn out) are also common.   Date Last Reviewed: 5/1/2016 2000-2018 [employer]. [address] [place] [address], [place] [address]. All rights reserved. This information is not intended as a substitute for professional medical care. Always follow your healthcare professional's instructions.           Patient Education     Treatment for COPD    Your healthcare provider will prescribe the best treatments for your COPD.  Treatment  Recommendations include:    Medicines. Some medicines help ease symptoms when you have them. Others are taken daily to control swelling (inflammation) in the lungs. Always take your medicines as prescribed. Learn the names of your medicines, as well as how and when to use them.    Oxygen therapy. Oxygen may be prescribed if tests show that your blood contains too little oxygen.    Smoking. If you smoke, quit. Smoking is the main cause of COPD. Quitting will help you be able to better manage your COPD. Ask your healthcare provider about ways to help you quit smoking.    Not getting infections. Infections, such as a cold or the flu, can cause your symptoms to get worse. Try to stay away from people who are sick. Wash your hands often. And ask your healthcare provider about vaccines for the flu and pneumonia.  Coping with shortness of breath  Coping tips include:    Exercise. Try to be as active as possible. This will improve energy levels and strengthen your muscles, so you can do more.    Breathing methods. Ask your healthcare provider or nurse to show you how to do pursed-lip breathing.    Balance rest and activity. Each day, try to balance rest periods with activity. For example, you might start the day with getting dressed and eating breakfast. Then you can relax and read the paper. After that, take a brief walk. And then sit with your feet up for a while.    Pulmonary rehab (rehabilitation).  These programs help  with managing your disease, breathing methods, exercise, support, and counseling. To find one, ask your provider or call your local hospital.    Healthy eating. Eating a healthy, balanced diet is important to staying as healthy as possible. So is trying to stay at your ideal weight. Make sure you have a lot of fruits and vegetables every day. And also eat balanced portions of whole grains, lean meats and fish, and low-fat dairy products.  Date Last Reviewed: 8/1/2018 2000-2018 [employer]. [address] [place] [address], [place] [address]. All rights reserved. This information is not intended as a substitute for professional medical care. Always follow your healthcare professional's instructions.

## 2019-11-21 NOTE — NURSING NOTE
Health Maintenance Due   Topic Date Due     PREVENTIVE CARE VISIT  1960     SPIROMETRY  1960     HEPATITIS C SCREENING  1960     ADVANCE CARE PLANNING  1960     COPD ACTION PLAN  1960     DTAP/TDAP/TD IMMUNIZATION (1 - Tdap) 09/11/1972     HIV SCREENING  09/11/1976     PNEUMOCOCCAL IMMUNIZATION 19-64 HIGHEST RISK (1 of 3 - PCV13) 09/11/1980     ZOSTER IMMUNIZATION (1 of 2) 09/11/2011     FIT  05/01/2016     INFLUENZA VACCINE (1) 09/01/2019     Leah RUFFIN LPN

## 2019-11-21 NOTE — PROGRESS NOTES
Subjective     Eliseo Davis is a 59 year old male who presents to clinic today for the following health issues:    HPI   COPD Follow-Up    Overall, how are your COPD symptoms since your last clinic visit?  Much worse    How much fatigue or shortness of breath do you have when you are walking?  A lot more than usual    How much shortness of breath do you have when you are resting?  More than usual    How often do you cough? All the time    Have you noticed any change in your sputum/phlegm?  Yes- increase    Have you experienced a recent fever? No    Please describe how far you can walk without stopping to rest:  Less than 1 block    How many flights of stairs are you able to walk up without stopping?  Does not do stairs    Have you had any Emergency Room Visits, Urgent Care Visits, or Hospital Admissions because of your COPD since your last office visit?  No    History   Smoking Status     Former Smoker     Packs/day: 1.00     Years: 35.00     Types: Cigarettes   Smokeless Tobacco     Former User     Quit date: 1/5/2017     No results found for: FEV1, RLC2UOY      How many servings of fruits and vegetables do you eat daily?  2-3    On average, how many sweetened beverages do you drink each day (soda, juice, sweet tea, etc)?   8    How many days per week do you miss taking your medication? 0      Patient is a 59 year old male who presents today with concerns about worsening SOB. He states that for the past month and a half he has noticed that he has become more short of breath with walking, estimating that his tolerance has limited his ability to walk more than a block. He says that he has a cough and has had URI symptoms off/on since this summer. Patient does have a history of COPD which he has managed with only ProAir and Symbicort. He does say that he is able to lay flat at night, but states that he is wheezy. He has not been as active due to the shortness of breath and has gained 10lbs over the past 4-5 months.  Patient denies chest pain, change in appetite, peripheral edema, changes in bowel/bladder or fever. He is a former smoker who quit 2 years prior. [facility]     Reviewed and updated as needed this visit by Provider         Review of Systems   ROS COMP: Constitutional, HEENT, cardiovascular, pulmonary, gi and gu systems are negative, except as otherwise noted.      Objective    /80 (BP Location: Left arm, Patient Position: Chair, Cuff Size: Adult Large)   Pulse 72   Temp 95.3  F (35.2  C) (Oral)   Resp 20   Wt 115.8 kg (255 lb 6.4 oz)   SpO2 95%   BMI 37.99 kg/m    Body mass index is 37.99 kg/m .  Physical Exam   GENERAL: healthy, alert and no distress  EYES: Eyes grossly normal to inspection, PERRL and conjunctivae and sclerae normal  HENT: ear canals and TM's normal, nose and mouth without ulcers or lesions  NECK: no adenopathy, no asymmetry, masses, or scars and thyroid normal to palpation  RESP: lungs clear to auscultation - no rales, rhonchi or wheezes, poor effort of breathing   CV: regular rate and rhythm, normal S1 S2, no S3 or S4, no murmur, click or rub, no peripheral edema and peripheral pulses strong  MS: no gross musculoskeletal defects noted, no edema  NEURO: Normal strength and tone, mentation intact and speech normal  PSYCH: mentation appears normal, affect normal/bright    Diagnostic Test Results:  Labs reviewed in Epic        Assessment & Plan       ICD-10-CM    1. Chronic obstructive pulmonary disease, unspecified COPD type (H) J44.9 ipratropium (ATROVENT HFA) 17 MCG/ACT inhaler     predniSONE (DELTASONE) 20 MG tablet        Discussed with patient normal exam and suspicion that his SOB is due to a worsening of his COPD. I did recommend that he add in atrovent to help loosen his lungs. As there are no signs of infection today I do not feel that antibiotics are needed. I have given the patient a steroid burst to help improve breathing and reviewed  educational information. If he is not improving he will call clinic and update on symptoms.     Return in about 6 months (around 5/21/2020) for Return for scheduled annual checkup with PCP.    Jaime Douglass PA-C  [facility]

## 2019-11-22 ASSESSMENT — ASTHMA QUESTIONNAIRES: ACT_TOTALSCORE: 6

## 2019-12-19 ENCOUNTER — OFFICE VISIT (OUTPATIENT)
Dept: FAMILY MEDICINE | Facility: OTHER | Age: 59
End: 2019-12-19
Payer: COMMERCIAL

## 2019-12-19 ENCOUNTER — ANCILLARY PROCEDURE (OUTPATIENT)
Dept: GENERAL RADIOLOGY | Facility: OTHER | Age: 59
End: 2019-12-19
Attending: PHYSICIAN ASSISTANT
Payer: COMMERCIAL

## 2019-12-19 VITALS
TEMPERATURE: 97.3 F | BODY MASS INDEX: 37.37 KG/M2 | SYSTOLIC BLOOD PRESSURE: 124 MMHG | HEART RATE: 92 BPM | RESPIRATION RATE: 22 BRPM | OXYGEN SATURATION: 95 % | WEIGHT: 251.2 LBS | DIASTOLIC BLOOD PRESSURE: 82 MMHG

## 2019-12-19 DIAGNOSIS — E66.812 CLASS 2 OBESITY DUE TO EXCESS CALORIES WITH BODY MASS INDEX (BMI) OF 38.0 TO 38.9 IN ADULT, UNSPECIFIED WHETHER SERIOUS COMORBIDITY PRESENT: ICD-10-CM

## 2019-12-19 DIAGNOSIS — R07.89 ATYPICAL CHEST PAIN: ICD-10-CM

## 2019-12-19 DIAGNOSIS — J44.9 CHRONIC OBSTRUCTIVE PULMONARY DISEASE, UNSPECIFIED COPD TYPE (H): Primary | ICD-10-CM

## 2019-12-19 DIAGNOSIS — E66.09 CLASS 2 OBESITY DUE TO EXCESS CALORIES WITH BODY MASS INDEX (BMI) OF 38.0 TO 38.9 IN ADULT, UNSPECIFIED WHETHER SERIOUS COMORBIDITY PRESENT: ICD-10-CM

## 2019-12-19 DIAGNOSIS — R68.89 DECREASED EXERCISE TOLERANCE: ICD-10-CM

## 2019-12-19 DIAGNOSIS — J44.9 CHRONIC OBSTRUCTIVE PULMONARY DISEASE, UNSPECIFIED COPD TYPE (H): ICD-10-CM

## 2019-12-19 LAB
ANION GAP SERPL CALCULATED.3IONS-SCNC: 2 MMOL/L (ref 3–14)
BASOPHILS # BLD AUTO: 0.1 10E9/L (ref 0–0.2)
BASOPHILS NFR BLD AUTO: 0.5 %
BUN SERPL-MCNC: 12 MG/DL (ref 7–30)
CALCIUM SERPL-MCNC: 8.7 MG/DL (ref 8.5–10.1)
CHLORIDE SERPL-SCNC: 105 MMOL/L (ref 94–109)
CO2 SERPL-SCNC: 30 MMOL/L (ref 20–32)
CREAT SERPL-MCNC: 1.1 MG/DL (ref 0.66–1.25)
DIFFERENTIAL METHOD BLD: NORMAL
EOSINOPHIL # BLD AUTO: 0.4 10E9/L (ref 0–0.7)
EOSINOPHIL NFR BLD AUTO: 3.7 %
ERYTHROCYTE [DISTWIDTH] IN BLOOD BY AUTOMATED COUNT: 13.8 % (ref 10–15)
GFR SERPL CREATININE-BSD FRML MDRD: 73 ML/MIN/{1.73_M2}
GLUCOSE SERPL-MCNC: 89 MG/DL (ref 70–99)
HCT VFR BLD AUTO: 45.2 % (ref 40–53)
HGB BLD-MCNC: 15 G/DL (ref 13.3–17.7)
LYMPHOCYTES # BLD AUTO: 2.9 10E9/L (ref 0.8–5.3)
LYMPHOCYTES NFR BLD AUTO: 27.2 %
MCH RBC QN AUTO: 31.4 PG (ref 26.5–33)
MCHC RBC AUTO-ENTMCNC: 33.2 G/DL (ref 31.5–36.5)
MCV RBC AUTO: 95 FL (ref 78–100)
MONOCYTES # BLD AUTO: 0.9 10E9/L (ref 0–1.3)
MONOCYTES NFR BLD AUTO: 8 %
NEUTROPHILS # BLD AUTO: 6.4 10E9/L (ref 1.6–8.3)
NEUTROPHILS NFR BLD AUTO: 60.6 %
NT-PROBNP SERPL-MCNC: 53 PG/ML (ref 0–125)
PLATELET # BLD AUTO: 365 10E9/L (ref 150–450)
POTASSIUM SERPL-SCNC: 4 MMOL/L (ref 3.4–5.3)
RBC # BLD AUTO: 4.78 10E12/L (ref 4.4–5.9)
SODIUM SERPL-SCNC: 137 MMOL/L (ref 133–144)
WBC # BLD AUTO: 10.6 10E9/L (ref 4–11)

## 2019-12-19 PROCEDURE — 71046 X-RAY EXAM CHEST 2 VIEWS: CPT | Mod: FY

## 2019-12-19 PROCEDURE — 36415 COLL VENOUS BLD VENIPUNCTURE: CPT | Performed by: PHYSICIAN ASSISTANT

## 2019-12-19 PROCEDURE — 85025 COMPLETE CBC W/AUTO DIFF WBC: CPT | Performed by: PHYSICIAN ASSISTANT

## 2019-12-19 PROCEDURE — 80048 BASIC METABOLIC PNL TOTAL CA: CPT | Performed by: PHYSICIAN ASSISTANT

## 2019-12-19 PROCEDURE — 93000 ELECTROCARDIOGRAM COMPLETE: CPT | Performed by: PHYSICIAN ASSISTANT

## 2019-12-19 PROCEDURE — 99214 OFFICE O/P EST MOD 30 MIN: CPT | Performed by: PHYSICIAN ASSISTANT

## 2019-12-19 PROCEDURE — 83880 ASSAY OF NATRIURETIC PEPTIDE: CPT | Performed by: PHYSICIAN ASSISTANT

## 2019-12-19 ASSESSMENT — PAIN SCALES - GENERAL: PAINLEVEL: NO PAIN (0)

## 2019-12-19 NOTE — PROGRESS NOTES
Addendum: Upon further review of the chart and EKG finding of mildly peaked T-waves I called the patient to discuss visit to emergency room. He denies chest pain, worsening SOB, nausea, weakness, palpitations or sweating. I did discuss with him the concern for infarction given the recent hospital workup in 02/2019, serial EKGs and trops were normal. Patient was advised to seek ED treatment if symptoms worsen, change or new symptoms appear.    Jaime Douglass PA-C on 12/19/2019 at 5:12 PM        Eliseo Davis is a 59 year old male who presents to clinic today for the following health issues:    HPI   COPD Follow-Up    Overall, how are your COPD symptoms since your last clinic visit?  No change    How much fatigue or shortness of breath do you have when you are walking?  A lot more than usual    How much shortness of breath do you have when you are resting?  More than usual    How often do you cough? All the time    Have you noticed any change in your sputum/phlegm?  No    Have you experienced a recent fever? No    Please describe how far you can walk without stopping to rest:  The length of 3-5 rooms    How many flights of stairs are you able to walk up without stopping?  2    Have you had any Emergency Room Visits, Urgent Care Visits, or Hospital Admissions because of your COPD since your last office visit?  No    History   Smoking Status     Former Smoker     Packs/day: 1.00     Years: 35.00     Types: Cigarettes   Smokeless Tobacco     Former User     Quit date: 1/5/2017     No results found for: FEV1, YSW3HBY      How many servings of fruits and vegetables do you eat daily?  0-1    On average, how many sweetened beverages do you drink each day (Examples: soda, juice, sweet tea, etc.  Do NOT count diet or artificially sweetened beverages)?   2    How many days per week do you miss taking your medication? 0      Patient is a 59 year old male who presents today for follow up of COPD and SOB. He was seen 1 month  prior at which time he was complaining of SOB with walking and other activity. He was given a prednisone burst and atrovent was added to his regimen. He was advised to contact clinic if not improving as expected. He did not improve, but also did not think to contact clinic. Today he states that his SOB has persisted and that he now becomes winded while making his bed or walking across his home. He denies chest pain, edema, cough, change in appetite, nausea, sweating, weakness or dizziness/light headedness. He does say that over the past few nights he has been sleeping in his armchair to breathe. I discussed with the patient concern for cardiac causes. He was admitted to the [facility] in 02/2019 for acute chest pain with mild Twave changes on EKG. Serial trops and telemetry were unremarkable and he was discharged with diagnosis of GERD.     Reviewed and updated as needed this visit by Provider         Review of Systems   ROS COMP: Constitutional, HEENT, cardiovascular, pulmonary, gi and gu systems are negative, except as otherwise noted.      Objective    There were no vitals taken for this visit.  There is no height or weight on file to calculate BMI.  Physical Exam   GENERAL: healthy, alert and no distress  NECK: no adenopathy, no asymmetry, masses, or scars and thyroid normal to palpation  RESP: lungs clear to auscultation - no rales, rhonchi or wheezes  CV: regular rate and rhythm, normal S1 S2, no S3 or S4, no murmur, click or rub, no peripheral edema and peripheral pulses strong  ABDOMEN: soft, nontender, no hepatosplenomegaly, no masses and bowel sounds normal  MS: no gross musculoskeletal defects noted, no edema  SKIN: no suspicious lesions or rashes  NEURO: Normal strength and tone, mentation intact and speech normal  PSYCH: mentation appears normal, affect normal/bright    Diagnostic Test Results:  Results for orders placed or performed in visit on 12/19/19   CBC with platelets and  differential     Status: None   Result Value Ref Range    WBC 10.6 4.0 - 11.0 10e9/L    RBC Count 4.78 4.4 - 5.9 10e12/L    Hemoglobin 15.0 13.3 - 17.7 g/dL    Hematocrit 45.2 40.0 - 53.0 %    MCV 95 78 - 100 fl    MCH 31.4 26.5 - 33.0 pg    MCHC 33.2 31.5 - 36.5 g/dL    RDW 13.8 10.0 - 15.0 %    Platelet Count 365 150 - 450 10e9/L    % Neutrophils 60.6 %    % Lymphocytes 27.2 %    % Monocytes 8.0 %    % Eosinophils 3.7 %    % Basophils 0.5 %    Absolute Neutrophil 6.4 1.6 - 8.3 10e9/L    Absolute Lymphocytes 2.9 0.8 - 5.3 10e9/L    Absolute Monocytes 0.9 0.0 - 1.3 10e9/L    Absolute Eosinophils 0.4 0.0 - 0.7 10e9/L    Absolute Basophils 0.1 0.0 - 0.2 10e9/L    Diff Method Automated Method    Basic metabolic panel     Status: Abnormal   Result Value Ref Range    Sodium 137 133 - 144 mmol/L    Potassium 4.0 3.4 - 5.3 mmol/L    Chloride 105 94 - 109 mmol/L    Carbon Dioxide 30 20 - 32 mmol/L    Anion Gap 2 (L) 3 - 14 mmol/L    Glucose 89 70 - 99 mg/dL    Urea Nitrogen 12 7 - 30 mg/dL    Creatinine 1.10 0.66 - 1.25 mg/dL    GFR Estimate 73 >60 mL/min/[1.73_m2]    GFR Estimate If Black 85 >60 mL/min/[1.73_m2]    Calcium 8.7 8.5 - 10.1 mg/dL   BNP-N terminal pro     Status: None   Result Value Ref Range    N-Terminal Pro Bnp 53 0 - 125 pg/mL     EKG - appears normal, NSR, mild T wave changes consistent with past EKGs, no LVH by voltage criteria, unchanged from previous tracings        Assessment & Plan       ICD-10-CM    1. Chronic obstructive pulmonary disease, unspecified COPD type (H) J44.9 XR Chest 2 Views     NM Exercise stress test     CBC with platelets and differential     Basic metabolic panel     BNP-N terminal pro     EKG 12-lead complete w/read - Clinics   2. Decreased exercise tolerance R68.89 NM Exercise stress test     CBC with platelets and differential     Basic metabolic panel     BNP-N terminal pro     EKG 12-lead complete w/read - Clinics   3. Atypical chest pain R07.89    4. Class 2 obesity due to  excess calories with body mass index (BMI) of 38.0 to 38.9 in adult, unspecified whether serious comorbidity present E66.09     Z68.38         Discussed normal lab results with patient and advised to keep appointment for stress test. Reviewed alarm symptoms for ACS and advised emergent care if these or other his other symptoms occur/worsen/change. Pending stress test, consider PFT and CT screen to assess lungs. Patient would benefit from additional inhaler therapy for COPD.     Return in about 1 month (around 1/19/2020) for Pending testing.    Jaime Douglass PA-C  [facility]

## 2019-12-20 ENCOUNTER — TELEPHONE (OUTPATIENT)
Dept: FAMILY MEDICINE | Facility: OTHER | Age: 59
End: 2019-12-20

## 2019-12-20 NOTE — TELEPHONE ENCOUNTER
I called this patient with the following per Jaime Douglass PA-C:  Please call with results. Please inform patient that the results of his lab work returned within normal range. This is reassuring, I would like him to continue with the current plan for the stress test. Pending the results of this test we should consider a pulmonary function test to assess his lung function.

## 2019-12-20 NOTE — TELEPHONE ENCOUNTER
----- Message from Jaime Douglass PA-C sent at 12/20/2019  7:23 AM CST -----  Please call with results. Please inform patient that the results of his lab work returned within normal range. This is reassuring, I would like him to continue with the current plan for the stress test. Pending the results of this test we should consider a pulmonary function test to assess his lung function.      Jaime Douglass PA-C on 12/20/2019 at 7:22 AM

## 2019-12-31 ENCOUNTER — HOSPITAL ENCOUNTER (OUTPATIENT)
Dept: NUCLEAR MEDICINE | Facility: CLINIC | Age: 59
Setting detail: NUCLEAR MEDICINE
Discharge: HOME | End: 2019-12-31
Attending: PHYSICIAN ASSISTANT | Admitting: PHYSICIAN ASSISTANT
Payer: COMMERCIAL

## 2019-12-31 VITALS — HEIGHT: 68 IN | BODY MASS INDEX: 38.04 KG/M2 | WEIGHT: 251 LBS

## 2019-12-31 DIAGNOSIS — R68.89 DECREASED EXERCISE TOLERANCE: ICD-10-CM

## 2019-12-31 DIAGNOSIS — J44.9 CHRONIC OBSTRUCTIVE PULMONARY DISEASE, UNSPECIFIED COPD TYPE (H): ICD-10-CM

## 2019-12-31 LAB
CV BLOOD PRESSURE: 64 %
CV STRESS MAX HR HE: 146
NUC STRESS EJECTION FRACTION: 75 %
RATE PRESSURE PRODUCT: NORMAL
STRESS ANGINA INDEX: 0
STRESS ECHO BASELINE DIASTOLIC HE: 66
STRESS ECHO BASELINE HR: 80
STRESS ECHO BASELINE SYSTOLIC BP: 124
STRESS ECHO CALCULATED PERCENT HR: 90 %
STRESS ECHO LAST STRESS DIASTOLIC BP: 66
STRESS ECHO LAST STRESS SYSTOLIC BP: 140
STRESS ECHO POST ESTIMATED WORKLOAD: 6.5 METS
STRESS ECHO POST EXERCISE DUR MIN: 5 MIN
STRESS ECHO POST EXERCISE DUR SEC: 30 SEC
STRESS ECHO TARGET HR: 162

## 2019-12-31 PROCEDURE — A9502 TC99M TETROFOSMIN: HCPCS | Performed by: PHYSICIAN ASSISTANT

## 2019-12-31 PROCEDURE — 34300033 ZZH RX 343: Performed by: PHYSICIAN ASSISTANT

## 2019-12-31 PROCEDURE — 78452 HT MUSCLE IMAGE SPECT MULT: CPT

## 2019-12-31 PROCEDURE — 78452 HT MUSCLE IMAGE SPECT MULT: CPT | Mod: 26 | Performed by: INTERNAL MEDICINE

## 2019-12-31 PROCEDURE — 93018 CV STRESS TEST I&R ONLY: CPT | Performed by: INTERNAL MEDICINE

## 2019-12-31 PROCEDURE — 93016 CV STRESS TEST SUPVJ ONLY: CPT | Performed by: INTERNAL MEDICINE

## 2019-12-31 PROCEDURE — 93017 CV STRESS TEST TRACING ONLY: CPT | Performed by: INTERNAL MEDICINE

## 2019-12-31 RX ADMIN — TETROFOSMIN 37.7 MCI.: 1.38 INJECTION, POWDER, LYOPHILIZED, FOR SOLUTION INTRAVENOUS at 09:45

## 2019-12-31 RX ADMIN — TETROFOSMIN 12.6 MCI.: 1.38 INJECTION, POWDER, LYOPHILIZED, FOR SOLUTION INTRAVENOUS at 08:15

## 2019-12-31 ASSESSMENT — MIFFLIN-ST. JEOR: SCORE: 1933.03

## 2020-01-02 ENCOUNTER — TELEPHONE (OUTPATIENT)
Dept: FAMILY MEDICINE | Facility: OTHER | Age: 60
End: 2020-01-02

## 2020-01-02 DIAGNOSIS — J44.9 CHRONIC OBSTRUCTIVE PULMONARY DISEASE, UNSPECIFIED COPD TYPE (H): Primary | ICD-10-CM

## 2020-01-02 RX ORDER — TIOTROPIUM BROMIDE 18 UG/1
18 CAPSULE ORAL; RESPIRATORY (INHALATION) DAILY
Qty: 30 CAPSULE | Refills: 11 | Status: ON HOLD | OUTPATIENT
Start: 2020-01-02 | End: 2020-12-28 | Stop reason: SINTOL

## 2020-01-02 NOTE — TELEPHONE ENCOUNTER
----- Message from Jaime Douglass PA-C sent at 1/2/2020 12:58 PM CST -----  Please call with results. Please inform patient that the stress test fortunately returned without concerning finding, making cardiac causes for his symptoms less likely. As we discussed at your appointment, now that the heart is less likely, we can more confidently treat your breathing. I have sent out a medication called spiriva for you to use in addition to your twice daily symbicort. If this does not improve symptoms as expected over the next 2-4 weeks, then we should consider pulmonary function testing or repeating the CT of your chest. If your symptoms do improve as expected then this may be continued.      Jaime Douglass PA-C on 1/2/2020 at 12:45 PM

## 2020-01-07 ENCOUNTER — TELEPHONE (OUTPATIENT)
Dept: FAMILY MEDICINE | Facility: OTHER | Age: 60
End: 2020-01-07

## 2020-01-07 DIAGNOSIS — J44.9 CHRONIC OBSTRUCTIVE PULMONARY DISEASE, UNSPECIFIED COPD TYPE (H): Primary | ICD-10-CM

## 2020-01-07 NOTE — TELEPHONE ENCOUNTER
Reason for Call:  Other adverse reaction to medication.    Detailed comments: patient presents to clinic reporting he is not able to use the Spiriva Inhaler. After using he has bad coughing spells, which almost make him pass out. Will need an alternative inhaler sent to [facility].     Phone Number Patient can be reached at: Home number on file [phone] (home)    Best Time: any time    Can we leave a detailed message on this number? YES    Call taken on 1/7/2020 at 11:08 AM by Suellen Lei

## 2020-01-07 NOTE — TELEPHONE ENCOUNTER
I have sent out an alternative medication for treatment of COPD. I am not sure how much this will cost, if it is too much please have the pharmacist call to discuss alternatives.     Jaime Douglass PA-C on 1/7/2020 at 2:40 PM

## 2020-01-07 NOTE — TELEPHONE ENCOUNTER
Spoke with patient and informed of message below. Patient understood. He will call and let us know if it is still to expensive and have pharmacy call the clinic.     Shameka Green MA

## 2020-03-03 DIAGNOSIS — J44.9 CHRONIC OBSTRUCTIVE PULMONARY DISEASE, UNSPECIFIED COPD TYPE (H): ICD-10-CM

## 2020-03-03 RX ORDER — IPRATROPIUM BROMIDE 17 UG/1
AEROSOL, METERED RESPIRATORY (INHALATION)
Qty: 12.9 G | OUTPATIENT
Start: 2020-03-03

## 2020-03-03 NOTE — TELEPHONE ENCOUNTER
Routing refill request to provider for review/approval because:  Drug not on the [facility] refill protocol (Aclidinium)  Drug not active on patient's medication list (Atrovent)  ACT <20    PACO FerrerN, RN  [facility]

## 2020-03-03 NOTE — TELEPHONE ENCOUNTER
"Requested Prescriptions   Pending Prescriptions Disp Refills     ATROVENT HFA 17 MCG/ACT inhaler [Pharmacy Med Name: ATROVENT HFA 17MCG/ACT AERS] 12.9 g 0     Sig: INHALE TWO PUFFS BY MOUTH EVERY 6 HOURS   Last Written Prescription Date:  11/21/19  Last Fill Quantity: 1 inhaler,  # refills: 11   Last office visit: 12/19/2019 with prescribing provider:  12/19/19   Future Office Visit:        Asthma Maintenance Inhalers - Anticholinergics Failed - 3/3/2020 10:36 AM        Failed - Asthma control assessment score within normal limits in last 6 months     Please review ACT score.   ACT Total Scores 7/31/2019 11/21/2019   ACT TOTAL SCORE (Goal Greater than or Equal to 20) 8 6   In the past 12 months, how many times did you visit the emergency room for your asthma without being admitted to the hospital? 0 0   In the past 12 months, how many times were you hospitalized overnight because of your asthma? 0 0           Failed - Medication is active on med list        Passed - Patient is age 12 years or older        Passed - Recent (6 mo) or future (30 days) visit within the authorizing provider's specialty     Patient had office visit in the last 6 months or has a visit in the next 30 days with authorizing provider or within the authorizing provider's specialty.  See \"Patient Info\" tab in inbasket, or \"Choose Columns\" in Meds & Orders section of the refill encounter.            aclidinium (TUDORZA PRESSAIR) 400 MCG/ACT inhaler [Pharmacy Med Name: TUDORZA PRESSAIR 400MCG/ACT AEPB] 1 Inhaler 0     Sig: INHALE ONE PUFF BY MOUTH TWICE A DAY   Last Written Prescription Date:  1/7/2020  Last Fill Quantity: 1 inhaler,  # refills: 0   Last office visit: 12/19/2019 with prescribing provider:  12/19/19   Future Office Visit:        There is no refill protocol information for this order        "

## 2020-05-09 DIAGNOSIS — J44.9 CHRONIC OBSTRUCTIVE PULMONARY DISEASE, UNSPECIFIED COPD TYPE (H): ICD-10-CM

## 2020-05-12 RX ORDER — ACLIDINIUM BROMIDE 400 UG/1
POWDER, METERED RESPIRATORY (INHALATION)
Refills: 0 | OUTPATIENT
Start: 2020-05-12

## 2020-05-12 NOTE — TELEPHONE ENCOUNTER
Tudorza      Last Written Prescription Date:  3/03/2020  Last Fill Quantity: 1 inhaler,   # refills: 0  Last Office Visit: 12/19/2019  Future Office visit:       Routing refill request to provider for review/approval because:  Drug not on the [facility], [facility] or  Health refill protocol or controlled substance

## 2020-05-12 NOTE — TELEPHONE ENCOUNTER
Patient is overdue for recommended follow up. Please help him schedule and I can refill medication to get him to that point.    Jaime Douglass PA-C on 5/12/2020 at 5:45 PM

## 2020-05-12 NOTE — TELEPHONE ENCOUNTER
2020 2:41 PM 
 
Ms. Gaby Swanson [address] [address] Dear Kt Dominguez MD, 
 
I had the opportunity to see your patient, Gaby Swanson, 2020, in the [facility] clinic. Please find my impression and suggestions attached. Feel free to call our office with any questions, [phone]. Sincerely, Mark Thompson MD 
 
 I called this patient with the following per Jaime Douglass PA-C:          Please call with results. Please inform patient that the stress test fortunately returned without concerning finding, making cardiac causes for his symptoms less likely. As we discussed at your appointment, now that the heart is less likely, we can more confidently treat your breathing. I have sent out a medication called spiriva for you to use in addition to your twice daily symbicort. If this does not improve symptoms as expected over the next 2-4 weeks, then we should consider pulmonary function testing or repeating the CT of your chest. If your symptoms do improve as expected then this may be continued.

## 2020-05-12 NOTE — TELEPHONE ENCOUNTER
C2C on file to speak with brother Kiko.  He will get the message to patient (below message).  Told him that he just needs to call and set up a phone visit to get a refill on his medication.  Will close enc.  Edson Mihcaels, CMA

## 2020-12-27 ENCOUNTER — ANESTHESIA EVENT (OUTPATIENT)
Dept: SURGERY | Facility: CLINIC | Age: 60
End: 2020-12-27
Payer: COMMERCIAL

## 2020-12-27 ENCOUNTER — ANESTHESIA (OUTPATIENT)
Dept: SURGERY | Facility: CLINIC | Age: 60
End: 2020-12-27
Payer: COMMERCIAL

## 2020-12-27 ENCOUNTER — HOSPITAL ENCOUNTER (INPATIENT)
Facility: CLINIC | Age: 60
LOS: 1 days | Discharge: HOME | End: 2020-12-28
Attending: EMERGENCY MEDICINE | Admitting: SURGERY
Payer: COMMERCIAL

## 2020-12-27 ENCOUNTER — APPOINTMENT (OUTPATIENT)
Dept: CT IMAGING | Facility: CLINIC | Age: 60
End: 2020-12-27
Attending: EMERGENCY MEDICINE
Payer: COMMERCIAL

## 2020-12-27 DIAGNOSIS — K35.30 ACUTE APPENDICITIS WITH LOCALIZED PERITONITIS, WITHOUT PERFORATION, ABSCESS, OR GANGRENE: ICD-10-CM

## 2020-12-27 DIAGNOSIS — Z20.828 EXPOSURE TO SARS-ASSOCIATED CORONAVIRUS: ICD-10-CM

## 2020-12-27 LAB
ALBUMIN SERPL-MCNC: 3.9 G/DL (ref 3.4–5)
ALP SERPL-CCNC: 107 U/L (ref 40–150)
ALT SERPL W P-5'-P-CCNC: 34 U/L (ref 0–70)
ANION GAP SERPL CALCULATED.3IONS-SCNC: 6 MMOL/L (ref 3–14)
AST SERPL W P-5'-P-CCNC: 23 U/L (ref 0–45)
BASOPHILS # BLD AUTO: 0.1 10E9/L (ref 0–0.2)
BASOPHILS NFR BLD AUTO: 0.6 %
BILIRUB SERPL-MCNC: 0.7 MG/DL (ref 0.2–1.3)
BUN SERPL-MCNC: 12 MG/DL (ref 7–30)
CALCIUM SERPL-MCNC: 8.9 MG/DL (ref 8.5–10.1)
CHLORIDE SERPL-SCNC: 106 MMOL/L (ref 94–109)
CO2 SERPL-SCNC: 26 MMOL/L (ref 20–32)
CREAT SERPL-MCNC: 1.08 MG/DL (ref 0.66–1.25)
DIFFERENTIAL METHOD BLD: ABNORMAL
EOSINOPHIL NFR BLD AUTO: 4.4 %
ERYTHROCYTE [DISTWIDTH] IN BLOOD BY AUTOMATED COUNT: 13.1 % (ref 10–15)
GFR SERPL CREATININE-BSD FRML MDRD: 75 ML/MIN/{1.73_M2}
GLUCOSE SERPL-MCNC: 96 MG/DL (ref 70–99)
HCT VFR BLD AUTO: 44 % (ref 40–53)
HGB BLD-MCNC: 14.6 G/DL (ref 13.3–17.7)
IMM GRANULOCYTES # BLD: 0.1 10E9/L (ref 0–0.4)
IMM GRANULOCYTES NFR BLD: 0.3 %
LABORATORY COMMENT REPORT: NORMAL
LIPASE SERPL-CCNC: 106 U/L (ref 73–393)
LYMPHOCYTES # BLD AUTO: 2.2 10E9/L (ref 0.8–5.3)
LYMPHOCYTES NFR BLD AUTO: 12.5 %
MCH RBC QN AUTO: 31.1 PG (ref 26.5–33)
MCHC RBC AUTO-ENTMCNC: 33.2 G/DL (ref 31.5–36.5)
MCV RBC AUTO: 94 FL (ref 78–100)
MONOCYTES # BLD AUTO: 1.1 10E9/L (ref 0–1.3)
MONOCYTES NFR BLD AUTO: 6.1 %
NEUTROPHILS # BLD AUTO: 13.5 10E9/L (ref 1.6–8.3)
NEUTROPHILS NFR BLD AUTO: 76.1 %
NRBC # BLD AUTO: 0 10*3/UL
NRBC BLD AUTO-RTO: 0 /100
PLATELET # BLD AUTO: 379 10E9/L (ref 150–450)
POTASSIUM SERPL-SCNC: 4.2 MMOL/L (ref 3.4–5.3)
PROT SERPL-MCNC: 7.8 G/DL (ref 6.8–8.8)
RBC # BLD AUTO: 4.7 10E12/L (ref 4.4–5.9)
SARS-COV-2 RNA SPEC QL NAA+PROBE: NEGATIVE
SODIUM SERPL-SCNC: 138 MMOL/L (ref 133–144)
SPECIMEN SOURCE: NORMAL
WBC # BLD AUTO: 17.7 10E9/L (ref 4–11)

## 2020-12-27 PROCEDURE — 99284 EMERGENCY DEPT VISIT MOD MDM: CPT | Mod: 25 | Performed by: EMERGENCY MEDICINE

## 2020-12-27 PROCEDURE — 96361 HYDRATE IV INFUSION ADD-ON: CPT | Performed by: EMERGENCY MEDICINE

## 2020-12-27 PROCEDURE — 44970 LAPAROSCOPY APPENDECTOMY: CPT | Performed by: SURGERY

## 2020-12-27 PROCEDURE — 250N000009 HC RX 250: Performed by: EMERGENCY MEDICINE

## 2020-12-27 PROCEDURE — 370N000001 HC ANESTHESIA TECHNICAL FEE, 1ST 30 MIN: Performed by: SURGERY

## 2020-12-27 PROCEDURE — 761N000007 HC RECOVERY PHASE 2 EACH 15 MINS: Performed by: SURGERY

## 2020-12-27 PROCEDURE — 0DTJ4ZZ RESECTION OF APPENDIX, PERCUTANEOUS ENDOSCOPIC APPROACH: ICD-10-PCS | Performed by: SURGERY

## 2020-12-27 PROCEDURE — 250N000013 HC RX MED GY IP 250 OP 250 PS 637: Performed by: NURSE ANESTHETIST, CERTIFIED REGISTERED

## 2020-12-27 PROCEDURE — 93010 ELECTROCARDIOGRAM REPORT: CPT | Performed by: EMERGENCY MEDICINE

## 2020-12-27 PROCEDURE — 250N000001 HC DESFLURANE, EA 15 MIN: Performed by: SURGERY

## 2020-12-27 PROCEDURE — 96375 TX/PRO/DX INJ NEW DRUG ADDON: CPT | Performed by: EMERGENCY MEDICINE

## 2020-12-27 PROCEDURE — 250N000011 HC RX IP 250 OP 636: Performed by: EMERGENCY MEDICINE

## 2020-12-27 PROCEDURE — C9803 HOPD COVID-19 SPEC COLLECT: HCPCS | Performed by: EMERGENCY MEDICINE

## 2020-12-27 PROCEDURE — 258N000003 HC RX IP 258 OP 636: Performed by: NURSE ANESTHETIST, CERTIFIED REGISTERED

## 2020-12-27 PROCEDURE — 271N000001 HC OR GENERAL SUPPLY NON-STERILE: Performed by: SURGERY

## 2020-12-27 PROCEDURE — 83690 ASSAY OF LIPASE: CPT | Performed by: EMERGENCY MEDICINE

## 2020-12-27 PROCEDURE — 96374 THER/PROPH/DIAG INJ IV PUSH: CPT | Performed by: EMERGENCY MEDICINE

## 2020-12-27 PROCEDURE — 360N000020 HC SURGERY LEVEL 3 1ST 30 MIN: Performed by: SURGERY

## 2020-12-27 PROCEDURE — 250N000009 HC RX 250: Performed by: SURGERY

## 2020-12-27 PROCEDURE — 88304 TISSUE EXAM BY PATHOLOGIST: CPT | Mod: 26 | Performed by: PATHOLOGY

## 2020-12-27 PROCEDURE — 360N000021 HC SURGERY LEVEL 3 EA 15 ADDTL MIN: Performed by: SURGERY

## 2020-12-27 PROCEDURE — 250N000009 HC RX 250: Performed by: NURSE ANESTHETIST, CERTIFIED REGISTERED

## 2020-12-27 PROCEDURE — 85025 COMPLETE CBC W/AUTO DIFF WBC: CPT | Performed by: EMERGENCY MEDICINE

## 2020-12-27 PROCEDURE — 370N000002 HC ANESTHESIA TECHNICAL FEE, EACH ADDTL 15 MIN: Performed by: SURGERY

## 2020-12-27 PROCEDURE — 250N000011 HC RX IP 250 OP 636: Performed by: NURSE ANESTHETIST, CERTIFIED REGISTERED

## 2020-12-27 PROCEDURE — 80053 COMPREHEN METABOLIC PANEL: CPT | Performed by: EMERGENCY MEDICINE

## 2020-12-27 PROCEDURE — 87635 SARS-COV-2 COVID-19 AMP PRB: CPT | Performed by: EMERGENCY MEDICINE

## 2020-12-27 PROCEDURE — 71260 CT THORAX DX C+: CPT

## 2020-12-27 PROCEDURE — 99291 CRITICAL CARE FIRST HOUR: CPT | Mod: 25 | Performed by: EMERGENCY MEDICINE

## 2020-12-27 PROCEDURE — 258N000003 HC RX IP 258 OP 636: Performed by: EMERGENCY MEDICINE

## 2020-12-27 PROCEDURE — 761N000003 HC RECOVERY PHASE 1 LEVEL 2 FIRST HR: Performed by: SURGERY

## 2020-12-27 PROCEDURE — 88304 TISSUE EXAM BY PATHOLOGIST: CPT | Mod: TC | Performed by: SURGERY

## 2020-12-27 PROCEDURE — 272N000001 HC OR GENERAL SUPPLY STERILE: Performed by: SURGERY

## 2020-12-27 PROCEDURE — 93005 ELECTROCARDIOGRAM TRACING: CPT | Performed by: EMERGENCY MEDICINE

## 2020-12-27 RX ORDER — METHOCARBAMOL 750 MG/1
750 TABLET, FILM COATED ORAL 4 TIMES DAILY PRN
Status: CANCELLED | OUTPATIENT
Start: 2020-12-27

## 2020-12-27 RX ORDER — OXYCODONE HYDROCHLORIDE 5 MG/1
5-10 TABLET ORAL
Status: CANCELLED | OUTPATIENT
Start: 2020-12-27

## 2020-12-27 RX ORDER — ONDANSETRON 4 MG/1
4 TABLET, ORALLY DISINTEGRATING ORAL EVERY 6 HOURS PRN
Status: CANCELLED | OUTPATIENT
Start: 2020-12-27

## 2020-12-27 RX ORDER — DIPHENHYDRAMINE HYDROCHLORIDE 50 MG/ML
25 INJECTION INTRAMUSCULAR; INTRAVENOUS EVERY 6 HOURS PRN
Status: CANCELLED | OUTPATIENT
Start: 2020-12-27

## 2020-12-27 RX ORDER — NALOXONE HYDROCHLORIDE 0.4 MG/ML
0.2 INJECTION, SOLUTION INTRAMUSCULAR; INTRAVENOUS; SUBCUTANEOUS
Status: DISCONTINUED | OUTPATIENT
Start: 2020-12-27 | End: 2020-12-28 | Stop reason: HOSPADM

## 2020-12-27 RX ORDER — IOPAMIDOL 755 MG/ML
100 INJECTION, SOLUTION INTRAVASCULAR ONCE
Status: COMPLETED | OUTPATIENT
Start: 2020-12-27 | End: 2020-12-27

## 2020-12-27 RX ORDER — ESMOLOL HYDROCHLORIDE 10 MG/ML
INJECTION INTRAVENOUS PRN
Status: DISCONTINUED | OUTPATIENT
Start: 2020-12-27 | End: 2020-12-27

## 2020-12-27 RX ORDER — SODIUM CHLORIDE, SODIUM LACTATE, POTASSIUM CHLORIDE, CALCIUM CHLORIDE 600; 310; 30; 20 MG/100ML; MG/100ML; MG/100ML; MG/100ML
INJECTION, SOLUTION INTRAVENOUS CONTINUOUS PRN
Status: DISCONTINUED | OUTPATIENT
Start: 2020-12-27 | End: 2020-12-27

## 2020-12-27 RX ORDER — LIDOCAINE HYDROCHLORIDE 20 MG/ML
INJECTION, SOLUTION INFILTRATION; PERINEURAL PRN
Status: DISCONTINUED | OUTPATIENT
Start: 2020-12-27 | End: 2020-12-27

## 2020-12-27 RX ORDER — ONDANSETRON 2 MG/ML
INJECTION INTRAMUSCULAR; INTRAVENOUS PRN
Status: DISCONTINUED | OUTPATIENT
Start: 2020-12-27 | End: 2020-12-27

## 2020-12-27 RX ORDER — DEXAMETHASONE SODIUM PHOSPHATE 10 MG/ML
INJECTION, SOLUTION INTRAMUSCULAR; INTRAVENOUS PRN
Status: DISCONTINUED | OUTPATIENT
Start: 2020-12-27 | End: 2020-12-27

## 2020-12-27 RX ORDER — KETOROLAC TROMETHAMINE 30 MG/ML
30 INJECTION, SOLUTION INTRAMUSCULAR; INTRAVENOUS ONCE
Status: COMPLETED | OUTPATIENT
Start: 2020-12-27 | End: 2020-12-27

## 2020-12-27 RX ORDER — FENTANYL CITRATE 50 UG/ML
INJECTION, SOLUTION INTRAMUSCULAR; INTRAVENOUS PRN
Status: DISCONTINUED | OUTPATIENT
Start: 2020-12-27 | End: 2020-12-27

## 2020-12-27 RX ORDER — FENTANYL CITRATE 50 UG/ML
25-50 INJECTION, SOLUTION INTRAMUSCULAR; INTRAVENOUS
Status: DISCONTINUED | OUTPATIENT
Start: 2020-12-27 | End: 2020-12-28 | Stop reason: HOSPADM

## 2020-12-27 RX ORDER — ONDANSETRON 4 MG/1
4-8 TABLET, ORALLY DISINTEGRATING ORAL EVERY 8 HOURS PRN
Qty: 4 TABLET | Refills: 0 | Status: SHIPPED | OUTPATIENT
Start: 2020-12-27 | End: 2020-12-28

## 2020-12-27 RX ORDER — DIMENHYDRINATE 50 MG/ML
25 INJECTION, SOLUTION INTRAMUSCULAR; INTRAVENOUS
Status: DISCONTINUED | OUTPATIENT
Start: 2020-12-27 | End: 2020-12-28 | Stop reason: HOSPADM

## 2020-12-27 RX ORDER — DIPHENHYDRAMINE HCL 25 MG
25 CAPSULE ORAL EVERY 6 HOURS PRN
Status: CANCELLED | OUTPATIENT
Start: 2020-12-27

## 2020-12-27 RX ORDER — LIDOCAINE 40 MG/G
CREAM TOPICAL
Status: CANCELLED | OUTPATIENT
Start: 2020-12-27

## 2020-12-27 RX ORDER — SODIUM CHLORIDE, SODIUM LACTATE, POTASSIUM CHLORIDE, CALCIUM CHLORIDE 600; 310; 30; 20 MG/100ML; MG/100ML; MG/100ML; MG/100ML
INJECTION, SOLUTION INTRAVENOUS CONTINUOUS
Status: CANCELLED | OUTPATIENT
Start: 2020-12-27

## 2020-12-27 RX ORDER — PROCHLORPERAZINE MALEATE 5 MG
10 TABLET ORAL EVERY 6 HOURS PRN
Status: CANCELLED | OUTPATIENT
Start: 2020-12-27

## 2020-12-27 RX ORDER — ACETAMINOPHEN 325 MG/1
975 TABLET ORAL EVERY 8 HOURS
Status: CANCELLED | OUTPATIENT
Start: 2020-12-27 | End: 2020-12-30

## 2020-12-27 RX ORDER — SODIUM CHLORIDE, SODIUM LACTATE, POTASSIUM CHLORIDE, CALCIUM CHLORIDE 600; 310; 30; 20 MG/100ML; MG/100ML; MG/100ML; MG/100ML
INJECTION, SOLUTION INTRAVENOUS CONTINUOUS
Status: DISCONTINUED | OUTPATIENT
Start: 2020-12-27 | End: 2020-12-28 | Stop reason: HOSPADM

## 2020-12-27 RX ORDER — ONDANSETRON 4 MG/1
4 TABLET, ORALLY DISINTEGRATING ORAL EVERY 30 MIN PRN
Status: DISCONTINUED | OUTPATIENT
Start: 2020-12-27 | End: 2020-12-28 | Stop reason: HOSPADM

## 2020-12-27 RX ORDER — CEFOXITIN 1 G/1
1 INJECTION, POWDER, FOR SOLUTION INTRAVENOUS ONCE
Status: COMPLETED | OUTPATIENT
Start: 2020-12-27 | End: 2020-12-27

## 2020-12-27 RX ORDER — OXYCODONE HYDROCHLORIDE 5 MG/1
5-10 TABLET ORAL EVERY 4 HOURS PRN
Qty: 12 TABLET | Refills: 0 | Status: SHIPPED | OUTPATIENT
Start: 2020-12-27 | End: 2020-12-28

## 2020-12-27 RX ORDER — SODIUM CHLORIDE 9 MG/ML
INJECTION, SOLUTION INTRAVENOUS CONTINUOUS
Status: CANCELLED | OUTPATIENT
Start: 2020-12-27

## 2020-12-27 RX ORDER — METOPROLOL TARTRATE 1 MG/ML
1-2 INJECTION, SOLUTION INTRAVENOUS EVERY 5 MIN PRN
Status: DISCONTINUED | OUTPATIENT
Start: 2020-12-27 | End: 2020-12-28 | Stop reason: HOSPADM

## 2020-12-27 RX ORDER — BUPIVACAINE HYDROCHLORIDE AND EPINEPHRINE 2.5; 5 MG/ML; UG/ML
INJECTION, SOLUTION INFILTRATION; PERINEURAL PRN
Status: DISCONTINUED | OUTPATIENT
Start: 2020-12-27 | End: 2020-12-28 | Stop reason: HOSPADM

## 2020-12-27 RX ORDER — AMOXICILLIN 250 MG
1-2 CAPSULE ORAL 2 TIMES DAILY
Qty: 30 TABLET | Refills: 0 | Status: SHIPPED | OUTPATIENT
Start: 2020-12-27 | End: 2020-12-28

## 2020-12-27 RX ORDER — ACETAMINOPHEN 325 MG/1
650 TABLET ORAL EVERY 6 HOURS PRN
Qty: 50 TABLET | Refills: 0 | COMMUNITY
Start: 2020-12-27

## 2020-12-27 RX ORDER — ACETAMINOPHEN 325 MG/1
650 TABLET ORAL EVERY 4 HOURS PRN
Status: CANCELLED | OUTPATIENT
Start: 2020-12-30

## 2020-12-27 RX ORDER — AMOXICILLIN 250 MG
2 CAPSULE ORAL 2 TIMES DAILY
Status: CANCELLED | OUTPATIENT
Start: 2020-12-27

## 2020-12-27 RX ORDER — ONDANSETRON 2 MG/ML
4 INJECTION INTRAMUSCULAR; INTRAVENOUS EVERY 30 MIN PRN
Status: DISCONTINUED | OUTPATIENT
Start: 2020-12-27 | End: 2020-12-28 | Stop reason: HOSPADM

## 2020-12-27 RX ORDER — PROPOFOL 10 MG/ML
INJECTION, EMULSION INTRAVENOUS PRN
Status: DISCONTINUED | OUTPATIENT
Start: 2020-12-27 | End: 2020-12-27

## 2020-12-27 RX ORDER — MEPERIDINE HYDROCHLORIDE 25 MG/ML
12.5 INJECTION INTRAMUSCULAR; INTRAVENOUS; SUBCUTANEOUS
Status: DISCONTINUED | OUTPATIENT
Start: 2020-12-27 | End: 2020-12-28 | Stop reason: HOSPADM

## 2020-12-27 RX ORDER — OXYCODONE HYDROCHLORIDE 5 MG/1
5 TABLET ORAL ONCE
Status: COMPLETED | OUTPATIENT
Start: 2020-12-28 | End: 2020-12-28

## 2020-12-27 RX ORDER — ONDANSETRON 2 MG/ML
4 INJECTION INTRAMUSCULAR; INTRAVENOUS
Status: COMPLETED | OUTPATIENT
Start: 2020-12-27 | End: 2020-12-27

## 2020-12-27 RX ORDER — HYDRALAZINE HYDROCHLORIDE 20 MG/ML
2.5-5 INJECTION INTRAMUSCULAR; INTRAVENOUS EVERY 10 MIN PRN
Status: DISCONTINUED | OUTPATIENT
Start: 2020-12-27 | End: 2020-12-28 | Stop reason: HOSPADM

## 2020-12-27 RX ORDER — AMOXICILLIN 250 MG
1 CAPSULE ORAL 2 TIMES DAILY
Status: CANCELLED | OUTPATIENT
Start: 2020-12-27

## 2020-12-27 RX ORDER — ONDANSETRON 2 MG/ML
4 INJECTION INTRAMUSCULAR; INTRAVENOUS EVERY 6 HOURS PRN
Status: CANCELLED | OUTPATIENT
Start: 2020-12-27

## 2020-12-27 RX ORDER — ALBUTEROL SULFATE 90 UG/1
AEROSOL, METERED RESPIRATORY (INHALATION) PRN
Status: DISCONTINUED | OUTPATIENT
Start: 2020-12-27 | End: 2020-12-27

## 2020-12-27 RX ORDER — HYDROMORPHONE HYDROCHLORIDE 1 MG/ML
.3-.5 INJECTION, SOLUTION INTRAMUSCULAR; INTRAVENOUS; SUBCUTANEOUS EVERY 10 MIN PRN
Status: DISCONTINUED | OUTPATIENT
Start: 2020-12-27 | End: 2020-12-28 | Stop reason: HOSPADM

## 2020-12-27 RX ADMIN — FENTANYL CITRATE 100 MCG: 50 INJECTION, SOLUTION INTRAMUSCULAR; INTRAVENOUS at 22:08

## 2020-12-27 RX ADMIN — HYDROMORPHONE HYDROCHLORIDE 0.5 MG: 1 INJECTION, SOLUTION INTRAMUSCULAR; INTRAVENOUS; SUBCUTANEOUS at 22:24

## 2020-12-27 RX ADMIN — LIDOCAINE HYDROCHLORIDE 80 MG: 20 INJECTION, SOLUTION INFILTRATION; PERINEURAL at 22:08

## 2020-12-27 RX ADMIN — SODIUM CHLORIDE 1000 ML: 9 INJECTION, SOLUTION INTRAVENOUS at 19:32

## 2020-12-27 RX ADMIN — ESMOLOL HYDROCHLORIDE 10 MG: 10 INJECTION, SOLUTION INTRAVENOUS at 22:37

## 2020-12-27 RX ADMIN — ALBUTEROL SULFATE 4 PUFF: 90 AEROSOL, METERED RESPIRATORY (INHALATION) at 22:40

## 2020-12-27 RX ADMIN — MIDAZOLAM 1 MG: 1 INJECTION INTRAMUSCULAR; INTRAVENOUS at 22:03

## 2020-12-27 RX ADMIN — SODIUM CHLORIDE, POTASSIUM CHLORIDE, SODIUM LACTATE AND CALCIUM CHLORIDE: 600; 310; 30; 20 INJECTION, SOLUTION INTRAVENOUS at 22:03

## 2020-12-27 RX ADMIN — ONDANSETRON 4 MG: 2 INJECTION INTRAMUSCULAR; INTRAVENOUS at 19:11

## 2020-12-27 RX ADMIN — PROPOFOL 50 MG: 10 INJECTION, EMULSION INTRAVENOUS at 22:33

## 2020-12-27 RX ADMIN — DEXAMETHASONE SODIUM PHOSPHATE 10 MG: 10 INJECTION, SOLUTION INTRAMUSCULAR; INTRAVENOUS at 22:15

## 2020-12-27 RX ADMIN — CEFOXITIN 1 G: 1 INJECTION, POWDER, FOR SOLUTION INTRAVENOUS at 21:25

## 2020-12-27 RX ADMIN — SODIUM CHLORIDE 80 ML: 9 INJECTION, SOLUTION INTRAVENOUS at 19:10

## 2020-12-27 RX ADMIN — ONDANSETRON 4 MG: 2 INJECTION INTRAMUSCULAR; INTRAVENOUS at 22:13

## 2020-12-27 RX ADMIN — MIDAZOLAM 1 MG: 1 INJECTION INTRAMUSCULAR; INTRAVENOUS at 22:06

## 2020-12-27 RX ADMIN — PROPOFOL 200 MG: 10 INJECTION, EMULSION INTRAVENOUS at 22:08

## 2020-12-27 RX ADMIN — IOPAMIDOL 90 ML: 755 INJECTION, SOLUTION INTRAVENOUS at 19:11

## 2020-12-27 RX ADMIN — KETOROLAC TROMETHAMINE 30 MG: 30 INJECTION, SOLUTION INTRAMUSCULAR at 18:55

## 2020-12-27 RX ADMIN — ROCURONIUM BROMIDE 50 MG: 10 INJECTION INTRAVENOUS at 22:08

## 2020-12-27 ASSESSMENT — COPD QUESTIONNAIRES
CAT_SEVERITY: MILD
COPD: 1

## 2020-12-27 ASSESSMENT — LIFESTYLE VARIABLES: TOBACCO_USE: 1

## 2020-12-28 VITALS
SYSTOLIC BLOOD PRESSURE: 113 MMHG | OXYGEN SATURATION: 91 % | DIASTOLIC BLOOD PRESSURE: 71 MMHG | HEART RATE: 90 BPM | RESPIRATION RATE: 18 BRPM | TEMPERATURE: 96.9 F

## 2020-12-28 PROBLEM — K35.30 ACUTE APPENDICITIS WITH LOCALIZED PERITONITIS, WITHOUT PERFORATION, ABSCESS, OR GANGRENE: Status: ACTIVE | Noted: 2020-12-28

## 2020-12-28 LAB — GLUCOSE BLDC GLUCOMTR-MCNC: 142 MG/DL (ref 70–99)

## 2020-12-28 PROCEDURE — 999N000123 HC STATISTIC OXYGEN O2DAILY TECH TIME

## 2020-12-28 PROCEDURE — 999N000156 HC STATISTIC RCP CONSULT EA 30 MIN

## 2020-12-28 PROCEDURE — 999N000157 HC STATISTIC RCP TIME EA 10 MIN

## 2020-12-28 PROCEDURE — 999N001017 HC STATISTIC GLUCOSE BY METER IP

## 2020-12-28 PROCEDURE — 258N000003 HC RX IP 258 OP 636: Performed by: SURGERY

## 2020-12-28 PROCEDURE — 250N000013 HC RX MED GY IP 250 OP 250 PS 637: Performed by: SURGERY

## 2020-12-28 PROCEDURE — 120N000001 HC R&B MED SURG/OB

## 2020-12-28 RX ORDER — ONDANSETRON 4 MG/1
4-8 TABLET, ORALLY DISINTEGRATING ORAL EVERY 8 HOURS PRN
Qty: 4 TABLET | Refills: 0 | Status: SHIPPED | OUTPATIENT
Start: 2020-12-28

## 2020-12-28 RX ORDER — AMOXICILLIN 250 MG
1-2 CAPSULE ORAL 2 TIMES DAILY
Qty: 30 TABLET | Refills: 0 | Status: SHIPPED | OUTPATIENT
Start: 2020-12-28

## 2020-12-28 RX ORDER — ALBUTEROL SULFATE 0.83 MG/ML
2.5 SOLUTION RESPIRATORY (INHALATION)
Status: DISCONTINUED | OUTPATIENT
Start: 2020-12-28 | End: 2020-12-28 | Stop reason: HOSPADM

## 2020-12-28 RX ORDER — LIDOCAINE 40 MG/G
CREAM TOPICAL
Status: DISCONTINUED | OUTPATIENT
Start: 2020-12-28 | End: 2020-12-28 | Stop reason: HOSPADM

## 2020-12-28 RX ORDER — OXYCODONE HYDROCHLORIDE 5 MG/1
5-10 TABLET ORAL
Status: DISCONTINUED | OUTPATIENT
Start: 2020-12-28 | End: 2020-12-28 | Stop reason: HOSPADM

## 2020-12-28 RX ORDER — PROCHLORPERAZINE MALEATE 5 MG
10 TABLET ORAL EVERY 6 HOURS PRN
Status: DISCONTINUED | OUTPATIENT
Start: 2020-12-28 | End: 2020-12-28 | Stop reason: HOSPADM

## 2020-12-28 RX ORDER — METHOCARBAMOL 750 MG/1
750 TABLET, FILM COATED ORAL 4 TIMES DAILY PRN
Status: DISCONTINUED | OUTPATIENT
Start: 2020-12-28 | End: 2020-12-28 | Stop reason: HOSPADM

## 2020-12-28 RX ORDER — ACETAMINOPHEN 325 MG/1
975 TABLET ORAL EVERY 8 HOURS
Status: DISCONTINUED | OUTPATIENT
Start: 2020-12-28 | End: 2020-12-28 | Stop reason: HOSPADM

## 2020-12-28 RX ORDER — ONDANSETRON 4 MG/1
4 TABLET, ORALLY DISINTEGRATING ORAL EVERY 6 HOURS PRN
Status: DISCONTINUED | OUTPATIENT
Start: 2020-12-28 | End: 2020-12-28 | Stop reason: HOSPADM

## 2020-12-28 RX ORDER — OXYCODONE HYDROCHLORIDE 5 MG/1
5-10 TABLET ORAL EVERY 4 HOURS PRN
Qty: 12 TABLET | Refills: 0 | Status: SHIPPED | OUTPATIENT
Start: 2020-12-28

## 2020-12-28 RX ORDER — NALOXONE HYDROCHLORIDE 0.4 MG/ML
0.4 INJECTION, SOLUTION INTRAMUSCULAR; INTRAVENOUS; SUBCUTANEOUS
Status: DISCONTINUED | OUTPATIENT
Start: 2020-12-28 | End: 2020-12-28 | Stop reason: HOSPADM

## 2020-12-28 RX ORDER — ONDANSETRON 2 MG/ML
4 INJECTION INTRAMUSCULAR; INTRAVENOUS EVERY 6 HOURS PRN
Status: DISCONTINUED | OUTPATIENT
Start: 2020-12-28 | End: 2020-12-28 | Stop reason: HOSPADM

## 2020-12-28 RX ORDER — AMOXICILLIN 250 MG
2 CAPSULE ORAL 2 TIMES DAILY
Status: DISCONTINUED | OUTPATIENT
Start: 2020-12-28 | End: 2020-12-28 | Stop reason: HOSPADM

## 2020-12-28 RX ORDER — NALOXONE HYDROCHLORIDE 0.4 MG/ML
0.2 INJECTION, SOLUTION INTRAMUSCULAR; INTRAVENOUS; SUBCUTANEOUS
Status: DISCONTINUED | OUTPATIENT
Start: 2020-12-28 | End: 2020-12-28 | Stop reason: HOSPADM

## 2020-12-28 RX ORDER — AMOXICILLIN 250 MG
1 CAPSULE ORAL 2 TIMES DAILY
Status: DISCONTINUED | OUTPATIENT
Start: 2020-12-28 | End: 2020-12-28 | Stop reason: HOSPADM

## 2020-12-28 RX ORDER — ACETAMINOPHEN 325 MG/1
650 TABLET ORAL EVERY 4 HOURS PRN
Status: DISCONTINUED | OUTPATIENT
Start: 2020-12-30 | End: 2020-12-28 | Stop reason: HOSPADM

## 2020-12-28 RX ORDER — SODIUM CHLORIDE, SODIUM LACTATE, POTASSIUM CHLORIDE, CALCIUM CHLORIDE 600; 310; 30; 20 MG/100ML; MG/100ML; MG/100ML; MG/100ML
INJECTION, SOLUTION INTRAVENOUS CONTINUOUS
Status: DISCONTINUED | OUTPATIENT
Start: 2020-12-28 | End: 2020-12-28 | Stop reason: HOSPADM

## 2020-12-28 RX ADMIN — SODIUM CHLORIDE, POTASSIUM CHLORIDE, SODIUM LACTATE AND CALCIUM CHLORIDE: 600; 310; 30; 20 INJECTION, SOLUTION INTRAVENOUS at 01:44

## 2020-12-28 RX ADMIN — OXYCODONE HYDROCHLORIDE 5 MG: 5 TABLET ORAL at 00:11

## 2020-12-28 RX ADMIN — ACETAMINOPHEN 975 MG: 325 TABLET, FILM COATED ORAL at 01:44

## 2020-12-28 RX ADMIN — OXYCODONE HYDROCHLORIDE 5 MG: 5 TABLET ORAL at 04:33

## 2020-12-28 RX ADMIN — DOCUSATE SODIUM AND SENNOSIDES 2 TABLET: 8.6; 5 TABLET ORAL at 01:45

## 2020-12-28 RX ADMIN — ACETAMINOPHEN 975 MG: 325 TABLET, FILM COATED ORAL at 08:04

## 2020-12-28 ASSESSMENT — ACTIVITIES OF DAILY LIVING (ADL)
CONCENTRATING,_REMEMBERING_OR_MAKING_DECISIONS_DIFFICULTY: NO
WEAR_GLASSES_OR_BLIND: YES
FALL_HISTORY_WITHIN_LAST_SIX_MONTHS: NO
TOILETING_ISSUES: NO
ADLS_ACUITY_SCORE: 12
DRESSING/BATHING_DIFFICULTY: NO
HEARING_DIFFICULTY_OR_DEAF: NO
EQUIPMENT_CURRENTLY_USED_AT_HOME: OTHER (SEE COMMENTS)
DIFFICULTY_EATING/SWALLOWING: NO
PATIENT_/_FAMILY_COMMUNICATION_STYLE: SPOKEN LANGUAGE (ENGLISH OR BILINGUAL)
ADLS_ACUITY_SCORE: 12
VISION_MANAGEMENT: READING GLASSES
DIFFICULTY_COMMUNICATING: NO
DOING_ERRANDS_INDEPENDENTLY_DIFFICULTY: NO
WALKING_OR_CLIMBING_STAIRS_DIFFICULTY: NO

## 2020-12-28 NOTE — PLAN OF CARE
VSS; afebrile.  Pain well controlled with oxycodone.  Denies shortness of air or nausea.  CMS intact.  IS up to 1500.  LS clear; pt on room air O2 with sats between 92-96.  Bowel sounds hypoactive.  Pt is steady on feet with adequate urine output.  Will continue to monitor incisions, for nausea and increase diet and activity.

## 2020-12-28 NOTE — PROGRESS NOTES
S-(situation): Patient arrives to room 251 via cart from PACU    B-(background): abdominal pain post opappendectomy    A-(assessment): patient alert and oriented lungs clear on room air. Oxygenation 92-95%, he denied pain at arrival but did have some oxycodone in PACU prior to arrival to the floor. Incision site clean dry and intact x 3 trochar sites closed with glue. Abdomen soft tender at incision sites. Bowel tones hypoactive but able to tolerate water with out difficulty and no complaints of nausea. He is due to void post operatively.      R-(recommendations): Orders reviewed with tad. Will monitor patient per MD orders. yes    Inpatient nursing criteria listed below were met:    Health care directives status obtained and documented: Yes  SCD's Documented: Yes  Skin issues/needs documented:Yes  Isolation addressed and Signage used: NA  Fall Prevention: Care plan updated, Education given and documented Yes  Care Plan initiated: Yes  Education Assessment documented:Yes  Education Documented (Pre-existing chronic infection such as, MRSA/VRE need education on admission): Yes  Allergies Reviewed: Yes  Admission Medication Reconciliation completed: Yes  New medication patient education completed and documented (Possible Side Effects of Common Medications handout): Yes  Home medications if not able to send immediately home with family stored here: NA  Reminder note placed in discharge instructions: NA  Discharge planning review completed (admission navigator) Yes

## 2020-12-28 NOTE — PROGRESS NOTES
Oxycodone printed script shredded due to not being signed.  Dr Banks will send electronic script to [facility] pharmacy

## 2020-12-28 NOTE — ANESTHESIA CARE TRANSFER NOTE
Patient: Eliseo Davis    Procedure(s):  APPENDECTOMY, LAPAROSCOPIC    Diagnosis: * No pre-op diagnosis entered *  Diagnosis Additional Information: No value filed.    Anesthesia Type:   General     Note:  Airway :Face Mask  Patient transferred to:PACU  Handoff Report: Identifed the Patient, Identified the Reponsible Provider, Reviewed the pertinent medical history, Discussed the surgical course, Reviewed Intra-OP anesthesia mangement and issues during anesthesia, Set expectations for post-procedure period and Allowed opportunity for questions and acknowledgement of understanding      Vitals: (Last set prior to Anesthesia Care Transfer)    CRNA VITALS  12/27/2020 2230 - 12/27/2020 2307      12/27/2020             Pulse:  104    SpO2:  90 %    Resp Rate (observed):  8                Electronically Signed By: BETTY Marques CRNA  December 27, 2020  11:07 PM

## 2020-12-28 NOTE — CONSULTS
I was asked to see Eliseo Davis for acute appendicitis by Oniel Hines D.O.    HPI:  Patient is a 60 year old male with complaints of abdominal pain that started today around 2 pm. It is located just below his umbilicus. He denies any fevers or chills. He does have associated lightheadedness. He rated the pain at an 8/10 when first seen. Pain medication makes it better.  He last ate at 4:30 pm    Review Of Systems  10 point ROS neg other than the symptoms noted above in the HPI.        Past Medical History:   Diagnosis Date     CARDIOVASCULAR SCREENING; LDL GOAL LESS THAN 130 9/25/2013     Uncomplicated asthma        Past Surgical History:   Procedure Laterality Date     CORONARY ANGIOGRAPHY ADULT ORDER  06/22/2017     ESOPHAGOSCOPY, GASTROSCOPY, DUODENOSCOPY (EGD), COMBINED N/A 2/25/2019    Procedure: COMBINED ESOPHAGOSCOPY, GASTROSCOPY, DUODENOSCOPY (EGD), BIOPSY SINGLE OR MULTIPLE;  Surgeon: Emmanuel De La Fuente DO;  Location:  GI       Social History     Socioeconomic History     Marital status: Single     Spouse name: Not on file     Number of children: Not on file     Years of education: Not on file     Highest education level: Not on file   Occupational History     Not on file   Social Needs     Financial resource strain: Not on file     Food insecurity     Worry: Not on file     Inability: Not on file     Transportation needs     Medical: Not on file     Non-medical: Not on file   Tobacco Use     Smoking status: Former Smoker     Packs/day: 1.00     Years: 35.00     Pack years: 35.00     Types: Cigarettes     Smokeless tobacco: Former User     Quit date: 1/5/2017   Substance and Sexual Activity     Alcohol use: No     Comment: 2 drinks yesterday     Drug use: Yes     Types: Marijuana     Comment: 2-3 tiomes a year     Sexual activity: Not Currently   Lifestyle     Physical activity     Days per week: Not on file     Minutes per session: Not on file     Stress: Not on file   Relationships     Social  connections     Talks on phone: Not on file     Gets together: Not on file     Attends [employer]ism service: Not on file     Active member of club or organization: Not on file     Attends meetings of clubs or organizations: Not on file     Relationship status: Not on file     Intimate partner violence     Fear of current or ex partner: Not on file     Emotionally abused: Not on file     Physically abused: Not on file     Forced sexual activity: Not on file   Other Topics Concern     Parent/sibling w/ CABG, MI or angioplasty before 65F 55M? Not Asked   Social History Narrative     Not on file       Current Outpatient Medications   Medication Sig Dispense Refill     bismuth subsalicylate (PEPTO BISMOL) 262 MG/15ML suspension Take 15 mLs by mouth every 6 hours as needed for indigestion       aclidinium (TUDORZA PRESSAIR) 400 MCG/ACT inhaler INHALE ONE PUFF BY MOUTH TWICE A DAY 1 Inhaler 0     albuterol (PROAIR HFA/PROVENTIL HFA/VENTOLIN HFA) 108 (90 BASE) MCG/ACT Inhaler Inhale 2 puffs into the lungs 4 times daily For 5 days, then use 2 puffs every 4 hours as needed (Patient taking differently: Inhale 1-2 puffs into the lungs every 4 hours as needed For 5 days, then use 2 puffs every 4 hours as needed) 1 Inhaler 0     ALBUTEROL SULFATE IN Albuterol neb, twice daily       aspirin EC 81 MG EC tablet Take 1 tablet (81 mg) by mouth daily 30 tablet 3     atorvastatin (LIPITOR) 80 MG tablet Take 40 mg by mouth daily       budesonide-formoterol (SYMBICORT) 80-4.5 MCG/ACT Inhaler Inhale 2 puffs into the lungs 2 times daily       cetirizine (ZYRTEC) 10 MG tablet Take 1 tablet (10 mg) by mouth daily 30 tablet 0     fluticasone (FLONASE) 50 MCG/ACT nasal spray Spray 1 spray into both nostrils daily       ipratropium (ATROVENT HFA) 17 MCG/ACT inhaler Inhale 2 puffs into the lungs every 6 hours 1 Inhaler 1     lisinopril (PRINIVIL/ZESTRIL) 10 MG tablet 5 mg daily       omeprazole (PRILOSEC) 40 MG DR capsule Take 1 capsule (40 mg) by  mouth 2 times daily (before meals) 60 capsule 0     predniSONE (DELTASONE) 20 MG tablet Take 1 tablet (20 mg) by mouth 2 times daily 10 tablet 0     tiotropium (SPIRIVA) 18 MCG inhaled capsule Inhale 1 capsule (18 mcg) into the lungs daily For COPD 30 capsule 11       Medications and history reviewed    Physical exam:  Vitals: /67   Pulse 81   Temp 98.2  F (36.8  C) (Temporal)   Resp 16   SpO2 94%   BMI= There is no height or weight on file to calculate BMI.    Constitutional: healthy, alert and no distress  Eyes: Pupils round and equal, no icterus   ENT: Mucous membranes moist  Respiratory:  Non-labored respiration  Gastrointestinal: Abdomen soft, tender over lower abdomen. BS normal. No masses, organomegaly  Musculoskeletal: No gross deformity  Neurologic: No gross focal deficits  Psychiatric: mentation appears normal and affect normal/bright  Hematologic/Lymphatic/Immunologic: No bruising noted  Skin: No lesions, rashes, erythema or jaundice noted    Labs show:  WBC 17.7    Imaging shows:  1. Limited evaluation of the thoracoabdominal aorta due to the timing  of contrast. Within this limitation, no evidence of aortic aneurysm or  dissection.  2. The appendix is enlarged measuring 18 mm in diameter and appear  fluid-filled and contain appendicolith. Although with no significant  periappendiceal fat stranding, findings are worrisome for early acute  appendicitis.  3. Cholelithiasis without CT evidence of acute cholecystitis.  4. Sequel of granulomatous disease including multiple calcified  mediastinal, hilar and splenic granulomas.    Assessment:     ICD-10-CM    1. Acute appendicitis with localized peritonitis, without perforation, abscess, or gangrene  K35.30 Asymptomatic SARS-CoV-2 COVID-19 Virus (Coronavirus) by PCR     Plan: Laparoscopic appendectomy .    Risk, benefits, and alternatives of surgery were discussed including but not limited to pain, bleeding, infection, cosmetic deformity, injury to  surrounding structures (including but not limited to bowel and bladder), bowel leak, abscess, and possible repeat surgery.  We discussed that antibiotics are also alternative we discussed the risk benefits and alternatives antibiotics including but not limited to recurrence, perforation, and longer hospital stay.    Patient elects to proceed with laparoscopic appendectomy.  Leeroy Banks, DO

## 2020-12-28 NOTE — OP NOTE
General Surgery Operative Note    Pre-operative diagnosis: Acute appendicitis   Post-operative diagnosis same   Procedure: Procedure(s):  APPENDECTOMY, LAPAROSCOPIC   Surgeon: Leeroy Banks DO   Assistants(s): None   Anesthesia: General    Estimated blood loss: 5 ml    Total IV fluids: (See anesthesia record)   Blood transfusion: No transfusion was given during surgery   Total urine output: (See anesthesia record)   Drains or packing: None   Specimens: Appendix   Implants: None   Findings: Acute appendicitis   Complications: None   Condition on discharge: Stable         DESCRIPTION OF PROCEDURE:  The patient was placed on the table in supine position.  General endotracheal anesthesia was induced and the abdomen was prepped and draped in standard sterile fashion.  Local was injected and an incision was made just above the umbilicus with an 11 blade.  A 5mm visiport was placed under direct vision in the usual fashion and the abdomen was insufflated with CO2.  Two 5 mm trocars were placed in the infraumbilical midline, one two fingerbreadths above the pubic symphysis and one on the left side.  The patient was placed in Trendelenburg and right side up.  The appendix was identified in the right lower quadrant.  It appeared acutely inflamed.  The appendix was freed up from the mesoappendix at its base bluntly using a ligasure device. The mesoappendix was ligated and divided using the ligasure. The base of the appendix was ligated using two endoloops and divided using the ligasure.  The appendix was passed into an Endocatch bag and removed through the umbilical trocar site.  The right lower quadrant was inspected for hemostasis.  Hemostasis was assured.  We irrigated with copious amounts of sterile saline and aspirated the effluent.  The abdomen was evacuated of CO2 and the trocars were removed under direct visualization.  There was no bleeding from any of these sites.  The skin of all 3 incisions was anesthetized with  local anesthetic and closed with interrupted 3-0 Vicryl deep subcuticular sutures. Dermabond was used to approximate the epidermis and small Steri-Strips were imbedded in the glue.  The patient tolerated the procedure well.  Sponge and instrument counts were correct.    Leeroy Banks D.O.

## 2020-12-28 NOTE — ED NOTES
Jennifer Cartwright CRNA; Alton Minaya RN; ST José Miguel; all paged/called and responded. En route for ASAP laparoscopy appendectomy with Dr. Banks

## 2020-12-28 NOTE — PLAN OF CARE
S-(situation): Patient discharged to home via ambulate to door with self at 0955.    B-(background): Abdominal pain s/p laparoscopic appendectomy    A-(assessment): A&O x4, VSS, afebrile. Lung sounds clear, on room air. Bowel sounds active x4, adequate urination. Tolerating full liquid diet, will advance as tolerated. Abdominal incisions closed with surgical glue, edges well approximated, no drainage, open to air. Rates pain 2/10, has PRN Oxycodone and scheduled Tylenol. Able to make needs known and use call light appropriately.     R-(recommendations): Discharge instructions reviewed with patient. Listed belongings gathered and returned to patient.          Discharge Nursing Criteria:     Care Plan and Patient education resolved: Yes    New Medications- pt has been educated about purpose and side effects: Yes    Vaccines  Influenza status verified at discharge:  Yes    MISC  Prescriptions if needed, hard copies sent with patient  NA  Home and hospital aquired medications returned to patient: NA  Medication Bin checked and emptied on discharge Yes  Patient reports post-discharge pain management plan is effective: Yes

## 2020-12-28 NOTE — ANESTHESIA PROCEDURE NOTES
Airway   Date/Time: 12/27/2020 10:11 PM   Patient location during procedure: OR    Staff -   Performed By: CRNA    Consent for Airway   Urgency: elective    Indications and Patient Condition  Indications for airway management: kenyatta-procedural  Induction type:intravenousMask difficulty assessment: 1 - vent by mask    Final Airway Details  Final airway type: endotracheal airway  Successful airway:ETT - single  Endotracheal Airway Details   ETT size (mm): 7.0  Cuffed: yes  Successful intubation technique: video laryngoscopy  Grade View of Cords: 1  Adjucts: stylet  Measured from: lips  Secured with: plastic tape  Bite block used: Oral Airway    Post intubation assessment   Placement verified by: capnometry, equal breath sounds and chest rise   Number of attempts at approach: 1  Number of other approaches attempted: 0  Secured with:plastic tape  Ease of procedure: easy  Dentition: Intact and Unchanged

## 2020-12-28 NOTE — ANESTHESIA POSTPROCEDURE EVALUATION
Patient: Eliseo Davis    Procedure(s):  APPENDECTOMY, LAPAROSCOPIC    Diagnosis:* No pre-op diagnosis entered *  Diagnosis Additional Information: No value filed.    Anesthesia Type:  General    Note:  Anesthesia Post Evaluation    Patient location during evaluation: PACU  Patient participation: Able to fully participate in evaluation  Level of consciousness: awake  Pain management: adequate  Airway patency: patent  Cardiovascular status: acceptable and hemodynamically stable  Respiratory status: acceptable, room air and nonlabored ventilation  Hydration status: stable  PONV: none     Anesthetic complications: None    Comments: Patient was happy with the anesthesia care received and no anesthesia related complications were noted.  I will follow up with the patient again if it is needed.        Last vitals:  Vitals:    12/28/20 0300 12/28/20 0405 12/28/20 0433   BP: 128/77  (!) 141/93   Pulse: 95  95   Resp: 16  16   Temp: 97.2  F (36.2  C) 97.2  F (36.2  C)    SpO2: 92%  93%         Electronically Signed By: BETTY Gutiérrez CRNA  December 28, 2020  7:38 AM

## 2020-12-28 NOTE — OR NURSING
Transfer from  PACU to Room 251  Transferred to bed via slider sheet (Glyder Mat,Transfer Boar,Slider Sheet)    S: 61 y/o male  S/P laparoscopic appendectomy        Anesthesia Type:  general       Surgeon:  Dr. Banks       Allergies:  See Medication Reconciliation Record       DNR: No     B:  Pertinent Medical History: COPD, cardiac history, former smoker  Past Medical History:   Diagnosis Date     CARDIOVASCULAR SCREENING; LDL GOAL LESS THAN 130 9/25/2013     Uncomplicated asthma              Surgical History:    Past Surgical History:   Procedure Laterality Date     CORONARY ANGIOGRAPHY ADULT ORDER  06/22/2017     ESOPHAGOSCOPY, GASTROSCOPY, DUODENOSCOPY (EGD), COMBINED N/A 2/25/2019    Procedure: COMBINED ESOPHAGOSCOPY, GASTROSCOPY, DUODENOSCOPY (EGD), BIOPSY SINGLE OR MULTIPLE;  Surgeon: Emmanuel De La Fuente DO;  Location:  GI         A:  EBL: 5        IVF:  See mar        UOP:          NPO:  ___Yes _X__No         Vomiting:  ___Yes X___No         Drainage: None        Skin Integrity: trochar x3         RFO: ___Yes__X_No (identify item if present)              Brace/sling/equipment:  ___Yes_X__No (identify item if present)         See PACU record for ongoing assessment, vital signs and pain assessment.    R: Post-Op vitals and assessments as ordered/indicated per patient's condition.       Follow Post-Op orders and notify Physician prn.       Continue to involve patient/family in plan of care and discharge planning.       Reinforce Pre-Operative education.       Implement skin safety interventions as appropriate.    Name: Jerrica

## 2020-12-28 NOTE — DISCHARGE INSTRUCTIONS
[facility]   Adult Discharge Orders & Instructions     For 24 hours after surgery    1. Get plenty of rest.  A responsible adult must stay with you for at least 24 hours after you leave the hospital.     2. Do not drive or use heavy equipment.  If you have weakness or tingling, don't drive or use heavy equipment until this feeling goes away.    3. Do not drink alcohol.    4. Avoid strenuous or risky activities.  Ask for help when climbing stairs.     5. You may feel lightheaded.  If so, sit for a few minutes before standing.  Have someone help you get up.     6. You may have a slight fever. Call the doctor if your fever is over 100 F (37.7 C) (taken under the tongue) or lasts longer than 24 hours.    7. You may have a dry mouth, a sore throat, muscle aches or trouble sleeping.  These should go away after 24 hours.    8. Do not make important or legal decisions.    Based on the surgery/procedure that you had today, we do not expect that you will have any problems.  However, we want you to know what to do if you have pain, nausea, bleeding, or infection:    To control pain:  Take medicines your physician has prescribed or or over-the counter medicine he or she advises.  Ice packs and periods of rest are often helpful.  For surgery on an arm or leg, raise it on a pillow to ease swelling.  If your pain is not managed with the above methods, contact your physician.    Copyright Jonathan Sims, Licensed under [employer]    To control nausea:  Take anti-nausea medicine approved by your physician.  Drink clear liquids such as apple juice, ginger ale, broth or 7-Up. Be sure to drink enough fluids.  Move to a regular diet as you feel able.  Rest may also help.    Bleeding:  You may see a little blood on your dressing, about the size of a quarter in the first 24 hours.  If you see this, there is no reason to be alarmed.  However, if this continues to increase in size, apply pressure if  able, and notify your physician.    Copyright [employer], Licensed under [employer]    Infection: Please contact your physician if you have any of the following signs:  redness, swelling, heat, increasing pain or foul-smelling drainage at your surgery site, fever or chills.    Call your doctor for any of the followin.  It has been over 8 to 10 hours since surgery and you are still not able to urinate (pass water).    2.  Headache for over 24 hours.    3.  Numbness, tingling or weakness in your legs the day after surgery (if you had spinal anesthesia).      Nurse advice line: [phone] 24 hours

## 2020-12-28 NOTE — ANESTHESIA PREPROCEDURE EVALUATION
Anesthesia Pre-Procedure Evaluation    Patient: Eliseo Davis   MRN: [id number] : 1960          Preoperative Diagnosis: * No pre-op diagnosis entered *    Procedure(s):  APPENDECTOMY, LAPAROSCOPIC    Past Medical History:   Diagnosis Date     CARDIOVASCULAR SCREENING; LDL GOAL LESS THAN 130 2013     Uncomplicated asthma      Past Surgical History:   Procedure Laterality Date     CORONARY ANGIOGRAPHY ADULT ORDER  2017     ESOPHAGOSCOPY, GASTROSCOPY, DUODENOSCOPY (EGD), COMBINED N/A 2019    Procedure: COMBINED ESOPHAGOSCOPY, GASTROSCOPY, DUODENOSCOPY (EGD), BIOPSY SINGLE OR MULTIPLE;  Surgeon: Emmanuel De La Fuente DO;  Location:  GI       Anesthesia Evaluation     . Pt has had prior anesthetic. Type: MAC and General    No history of anesthetic complications          ROS/MED HX    ENT/Pulmonary: Comment: Reactive airway disease    Pulmonary nodule    (+)tobacco use, Past use 35 pack year hx - quit 2017 packs/day  Moderate Persistent asthma Treatment: Inhaler daily and Nebulizer prn,  mild COPD, , . Other pulmonary disease Pulmonary nodule.    Neurologic:  - neg neurologic ROS     Cardiovascular: Comment: Thoracic and abdominal aneurysm     (+) Dyslipidemia, hypertension----. : . . . :. . Previous cardiac testing Echodate:2017results:Interpretation Summary     Left ventricular systolic function is normal. The visual ejection fraction is  estimated at 60-65%. There is mild to moderate concentric left ventricular  hypertrophy.  Contrast was used without apparent complications. There is no comparison study  available.Stress Testdate:19 results:No ischemic changesECG reviewed date:20 results:SR wide QRSCath date:  results:Coronary findings     Coronary circulation is right dominant     LM: Left main is a large-caliber vessel which gives rise to the LAD  and circumflex respectively. There is no significant disease in the  left main.     LAD: Left anterior descending artery is  a medium to large caliber  vessel which extends and wraps on apex. This vessel gives rise to  several septal perforators 1 major diagonal vessel in the mid segment  midportion the vessel also has a prominent myocardial bridge. The  vessel then courses towards the apex and has scattered diffuse disease  of 20-25%. Ostium of the first diagonal vessel has a 25% stenosis.     CFX: Circumflex is a medium size vessel which gives rise to 2 major  obtuse marginal branches in the mid segment. The mid to distal portion  of the circumflex on going into the OM 2 branch has a 30-40% diffuse  stenosis     RCA: Right coronary artery is a medium size dominant vessel. This  vessel gives rise to a conus branch proximally RB marginal branch in  the mid segment and then distally terminates into a medium size. A  medium-sized posterior lateral branch. Midportion the vessel has a 20%  diffuse stenosis.     Summary  1. Mild to moderate nonocclusive coronary artery disease  2. Normal left ventricular end-diastolic pressure             METS/Exercise Tolerance:     Hematologic: Comments: neurophilic  leukocytosis        Musculoskeletal:   (+)  other musculoskeletal- thoracic back pain      GI/Hepatic:     (+) GERD       Renal/Genitourinary:  - ROS Renal section negative   (+) Pt has no history of transplant,       Endo:     (+) Obesity, .      Psychiatric:  - neg psychiatric ROS       Infectious Disease:  - neg infectious disease ROS       Malignancy:      - no malignancy   Other:    (+) No chance of pregnancy C-spine cleared: N/A, no H/O Chronic Pain,                        Physical Exam  Normal systems: cardiovascular, pulmonary and dental    Airway   Mallampati: II  TM distance: >3 FB  Neck ROM: full    Dental     Cardiovascular   Rhythm and rate: regular and normal      Pulmonary    breath sounds clear to auscultation            Lab Results   Component Value Date    WBC 17.7 (H) 12/27/2020    HGB 14.6 12/27/2020    HCT 44.0 12/27/2020     " 12/27/2020    CRP 14.4 (H) 06/21/2017    SED 9 05/30/2004     12/27/2020    POTASSIUM 4.2 12/27/2020    CHLORIDE 106 12/27/2020    CO2 26 12/27/2020    BUN 12 12/27/2020    CR 1.08 12/27/2020    GLC 96 12/27/2020    LAUREN 8.9 12/27/2020    ALBUMIN 3.9 12/27/2020    PROTTOTAL 7.8 12/27/2020    ALT 34 12/27/2020    AST 23 12/27/2020    ALKPHOS 107 12/27/2020    BILITOTAL 0.7 12/27/2020    LIPASE 106 12/27/2020    TSH 0.46 06/22/2017       Preop Vitals  BP Readings from Last 3 Encounters:   12/27/20 117/67   12/19/19 124/82   11/21/19 124/80    Pulse Readings from Last 3 Encounters:   12/27/20 81   12/19/19 92   11/21/19 72      Resp Readings from Last 3 Encounters:   12/27/20 16   12/19/19 22   11/21/19 20    SpO2 Readings from Last 3 Encounters:   12/27/20 94%   12/19/19 95%   11/21/19 95%      Temp Readings from Last 1 Encounters:   12/27/20 98.2  F (36.8  C) (Temporal)    Ht Readings from Last 1 Encounters:   12/31/19 1.727 m (5' 8\")      Wt Readings from Last 1 Encounters:   12/31/19 113.9 kg (251 lb)    Estimated body mass index is 38.16 kg/m  as calculated from the following:    Height as of 12/31/19: 1.727 m (5' 8\").    Weight as of 12/31/19: 113.9 kg (251 lb).       Anesthesia Plan      History & Physical Review  History and physical reviewed and following examination; no interval change.    ASA Status:  3 emergent.    NPO Status:  > 6 hours    Plan for General with Intravenous and Propofol induction. Maintenance will be Balanced.    PONV prophylaxis:  Ondansetron (or other 5HT-3) and Dexamethasone or Solumedrol  Additional equipment: Videolaryngoscope        Postoperative Care  Postoperative pain management:  IV analgesics and Oral pain medications.      Consents  Anesthetic plan, risks, benefits and alternatives discussed with:  Patient..                 BETTY Marques CRNA  "

## 2020-12-28 NOTE — ED PROVIDER NOTES
History     Chief Complaint   Patient presents with     Abdominal Pain     The history is provided by the patient.     Eliseo Davis is a 60 year old male who presents to the emergency department secondary to abdominal pain and discomfort starting suddenly at 1400. The patient notes that this morning he woke up feeling fine and had no pain. His pain has been consistently worsening since starting. It is located in his mid, lower abdomen beneath his belly button. Currently rates it 8 on a scale of 10. Bumps in the road while driving did not cause his pain to worsen. He has felt lightheaded as though he was going to faint. No fever, chills, urinary symptoms. Last bowel movement was this morning and was normal. No history of abdominal surgeries. US in 2019 showed gallbladder sludge, but no stones.     Allergies:  Allergies   Allergen Reactions     Penicillins Anaphylaxis     Dust Mites      Pollen Extract        Problem List:    Patient Active Problem List    Diagnosis Date Noted     Obesity (BMI 35.0-39.9) with comorbidity (H) 07/31/2019     Priority: Medium     Atypical chest pain 02/16/2019     Priority: Medium     COPD (chronic obstructive pulmonary disease) (H) 02/16/2019     Priority: Medium     Hyperlipidemia LDL goal <100 02/16/2019     Priority: Medium     Gastroesophageal reflux disease 02/16/2019     Priority: Medium     Tachycardia 02/16/2019     Priority: Medium     ACS (acute coronary syndrome) (H) 06/22/2017     Priority: Medium     Urticaria 06/13/2017     Priority: Medium     Neutrophilic leukocytosis 06/12/2017     Priority: Medium     Pulmonary nodule - 5 mm RUL (high risk pt) 06/12/2017     Priority: Medium     Reactive airway disease with wheezing with acute exacerbation 06/11/2017     Priority: Medium     Obesity 06/11/2017     Priority: Medium     CARDIOVASCULAR SCREENING; LDL GOAL LESS THAN 130 09/25/2013     Priority: Medium     Thoracic back pain 09/25/2013     Priority: Medium        Past  Medical History:    Past Medical History:   Diagnosis Date     CARDIOVASCULAR SCREENING; LDL GOAL LESS THAN 130 9/25/2013     Uncomplicated asthma        Past Surgical History:    Past Surgical History:   Procedure Laterality Date     CORONARY ANGIOGRAPHY ADULT ORDER  06/22/2017     ESOPHAGOSCOPY, GASTROSCOPY, DUODENOSCOPY (EGD), COMBINED N/A 2/25/2019    Procedure: COMBINED ESOPHAGOSCOPY, GASTROSCOPY, DUODENOSCOPY (EGD), BIOPSY SINGLE OR MULTIPLE;  Surgeon: Emmanuel De La Fuente DO;  Location:  GI       Family History:    Family History   Problem Relation Age of Onset     Cancer Mother      Cancer Father        Social History:  Marital Status:  Single [1]  Social History     Tobacco Use     Smoking status: Former Smoker     Packs/day: 1.00     Years: 35.00     Pack years: 35.00     Types: Cigarettes     Smokeless tobacco: Former User     Quit date: 1/5/2017   Substance Use Topics     Alcohol use: No     Comment: 2 drinks yesterday     Drug use: Yes     Types: Marijuana     Comment: 2-3 tiomes a year        Medications:         bismuth subsalicylate (PEPTO BISMOL) 262 MG/15ML suspension       aclidinium (TUDORZA PRESSAIR) 400 MCG/ACT inhaler       albuterol (PROAIR HFA/PROVENTIL HFA/VENTOLIN HFA) 108 (90 BASE) MCG/ACT Inhaler       ALBUTEROL SULFATE IN       aspirin EC 81 MG EC tablet       atorvastatin (LIPITOR) 80 MG tablet       budesonide-formoterol (SYMBICORT) 80-4.5 MCG/ACT Inhaler       cetirizine (ZYRTEC) 10 MG tablet       fluticasone (FLONASE) 50 MCG/ACT nasal spray       ipratropium (ATROVENT HFA) 17 MCG/ACT inhaler       lisinopril (PRINIVIL/ZESTRIL) 10 MG tablet       omeprazole (PRILOSEC) 40 MG DR capsule       predniSONE (DELTASONE) 20 MG tablet       tiotropium (SPIRIVA) 18 MCG inhaled capsule          Review of Systems   All other systems reviewed and are negative.      Physical Exam   BP: (!) 147/78  Pulse: 80  Temp: 98.2  F (36.8  C)  Resp: 18  SpO2: 98 %      Physical Exam  Vitals signs and  nursing note reviewed.   Abdominal:      Tenderness: There is abdominal tenderness in the right lower quadrant and left lower quadrant. There is no guarding or rebound.         ED Course        Procedures          EKG:  Interpretation by Oniel Hines DO.   Indication: Preoperative evaluation  Sinus rhythm.  Rate 86 bpm  Low voltage limb leads nonspecific  No acute ST-T repolarization changes.  No Q waves.  Abnormal EKG         Results for orders placed or performed during the hospital encounter of 12/27/20 (from the past 24 hour(s))   CBC with platelets differential   Result Value Ref Range    WBC 17.7 (H) 4.0 - 11.0 10e9/L    RBC Count 4.70 4.4 - 5.9 10e12/L    Hemoglobin 14.6 13.3 - 17.7 g/dL    Hematocrit 44.0 40.0 - 53.0 %    MCV 94 78 - 100 fl    MCH 31.1 26.5 - 33.0 pg    MCHC 33.2 31.5 - 36.5 g/dL    RDW 13.1 10.0 - 15.0 %    Platelet Count 379 150 - 450 10e9/L    Diff Method Automated Method     % Neutrophils 76.1 %    % Lymphocytes 12.5 %    % Monocytes 6.1 %    % Eosinophils 4.4 %    % Basophils 0.6 %    % Immature Granulocytes 0.3 %    Nucleated RBCs 0 0 /100    Absolute Neutrophil 13.5 (H) 1.6 - 8.3 10e9/L    Absolute Lymphocytes 2.2 0.8 - 5.3 10e9/L    Absolute Monocytes 1.1 0.0 - 1.3 10e9/L    Absolute Basophils 0.1 0.0 - 0.2 10e9/L    Abs Immature Granulocytes 0.1 0 - 0.4 10e9/L    Absolute Nucleated RBC 0.0    Comprehensive metabolic panel   Result Value Ref Range    Sodium 138 133 - 144 mmol/L    Potassium 4.2 3.4 - 5.3 mmol/L    Chloride 106 94 - 109 mmol/L    Carbon Dioxide 26 20 - 32 mmol/L    Anion Gap 6 3 - 14 mmol/L    Glucose 96 70 - 99 mg/dL    Urea Nitrogen 12 7 - 30 mg/dL    Creatinine 1.08 0.66 - 1.25 mg/dL    GFR Estimate 75 >60 mL/min/[1.73_m2]    GFR Estimate If Black 86 >60 mL/min/[1.73_m2]    Calcium 8.9 8.5 - 10.1 mg/dL    Bilirubin Total 0.7 0.2 - 1.3 mg/dL    Albumin 3.9 3.4 - 5.0 g/dL    Protein Total 7.8 6.8 - 8.8 g/dL    Alkaline Phosphatase 107 40 - 150 U/L    ALT 34 0 - 70  U/L    AST 23 0 - 45 U/L   Lipase   Result Value Ref Range    Lipase 106 73 - 393 U/L   CT Aortic Survey w Contrast    Narrative    CT AORTIC SURVEY WITH CONTRAST  12/27/2020 7:58 PM    HISTORY:  Initial complaint of bilateral lower abdominal pain now  pale, pasty, hypotensive rule out vascular leak from AAA.    TECHNIQUE: CT scan obtained of the chest, abdomen, and pelvis with IV  contrast. Post contrast scanning performed during the arterial phase.  CT chest also obtained without IV contrast. Isovue-370, 90mL IV  injected. Sagittal and coronal reformatted images acquired from the  source post contrast data. Radiation dose for this scan was reduced  using automated exposure control, adjustment of the mA and/or kV  according to patient size, or iterative reconstruction technique.    COMPARISON:  Chest CT on 2/16/2019.    FINDINGS:  Thoracic and abdominal aorta: Limited evaluation of the  thoracoabdominal aorta due to the timing of contrast. With this  limitation, no evidence of thoracoabdominal aortic aneurysm or  dissection.    Other vascular: The major branches of the thoracoabdominal aorta are  patent.    Chest: Thyroid gland is unremarkable. No cardiomegaly or significant  pericardial effusion. No significant mediastinal, hilar or axillary  lymph adenopathy. A few calcified mediastinal and hilar granulomas.  Small hiatal hernia.    No significant pleural effusion or pneumothorax. Bibasilar pulmonary  opacities, likely atelectasis. A few scattered nodular opacities along  the lung fissures, likely benign intrafissural lymph nodes. 3 mm  medial right upper lobe nodule (series 7 image 53), not significantly  changed as compared to the 2/16/2019 exam and likely benign.    Abdomen/pelvis: No suspicious focal hepatic lesion. Cholelithiasis  without CT evidence of acute cholecystitis. No main pancreatic ductal  dilatation or definite solid pancreatic mass. No splenomegaly.  Multiple calcified splenic granulomas. No  adrenal nodules. No  radiodense kidney/ureteral stone or hydronephrosis.    No abnormally dilated bowel loops. No significant free fluid in the  abdomen and pelvis. No free peritoneal or portal venous gas. The  appendix is dilated and fluid-filled measuring 18 mm in diameter with  appendicolith, increased in size as compared to 2/16/2019 exam where  it measured 8 mm.    Bones and soft tissues: Multilevel degenerative changes of the spine.  Mild compression deformity of the superior endplate of T11.      Impression    IMPRESSION:   1. Limited evaluation of the thoracoabdominal aorta due to the timing  of contrast. Within this limitation, no evidence of aortic aneurysm or  dissection.  2. The appendix is enlarged measuring 18 mm in diameter and appear  fluid-filled and contain appendicolith. Although with no significant  periappendiceal fat stranding, findings are worrisome for early acute  appendicitis.  3. Cholelithiasis without CT evidence of acute cholecystitis.  4. Sequel of granulomatous disease including multiple calcified  mediastinal, hilar and splenic granulomas.    NICOLE ALEMAN MD       Medications   cefOXitin (MEFOXIN) 1 g in sodium chloride 0.9 % 50 mL intermittent infusion (has no administration in time range)   ketorolac (TORADOL) injection 30 mg (30 mg Intravenous Given 12/27/20 1855)   sodium chloride (PF) 0.9% PF flush 3 mL (3 mLs Intravenous Given 12/27/20 1910)   iopamidol (ISOVUE-370) solution 100 mL (90 mLs Intravenous Given 12/27/20 1911)   sodium chloride 0.9 % bag 500mL for CT scan flush use (80 mLs As instructed Given 12/27/20 1910)   ondansetron (ZOFRAN) injection 4 mg (4 mg Intravenous Given 12/27/20 1911)   0.9% sodium chloride BOLUS (0 mLs Intravenous Stopped 12/27/20 2052)       Assessments & Plan (with Medical Decision Making)  Eliseo CHELSEY Davis is a 60 year old who presents with concerns of abdominal pain onset suddenly at 1400. No abdominal surgical history. Upon arrival in the ED  he is afebrile and vitals are stable. There was equal abdominal tenderness in the left and right lower quadrants without rebound or guarding.   The patient was given IV fluids and Toradol to treat his pain. Basic labs were collected and reviewed.   After IV placement patient had transient hypotension, diaphoresis, altered mentation followed by vomiting.  He then complained of more severe abdominal pain.  Rapidly moved to the CT scanner for vascular assessment.  Fortunately no concerns with thoracic abdominal aorta.  CT did confirm dilated- enlarged appendix with an appendicolith.  This finding present along with leukocytosis  (17,000) consistent with acute appendicitis.  Discussed care with Dr. Mack on-call for general surgery.  Patient has been medically cleared for laparoscopic appendectomy with open procedure if needed.  Patient is in agreement.  Initially plan to give patient Unasyn.  Chose altered antibiotics because of possible anaphylaxis from a penicillin derivative.  Given cefoxitin.  Will monitor for any cross-reactivity.  Has been held n.p.o. since the ED arrival.  Patient was screened for coronary disease with a heart cath in 2017.  did not require any intervention.  He had a normal nuclear medicine GXT in 2019 and he reports he has had no chest discomfort or palpitations with cold air exposure activity.  Does have COPD which has been stable since he discontinued tobacco use 4 years ago.  I repeat EKG today showed sinus rhythm.  He had normal repolarization and no ectopy.  Did have some low voltage across the limb leads which was nonspecific.    Medically cleared for surgery and anesthesia.       I have reviewed the nursing notes.    I have reviewed the findings, diagnosis, plan and need for follow up with the patient.      New Prescriptions    No medications on file       Final diagnoses:   Acute appendicitis with localized peritonitis, without perforation, abscess, or gangrene         This document  serves as a record of services personally performed by Oniel Hines, *. It was created on their behalf by Sydnee Bansal, a trained medical scribe. The creation of this record is based on the provider's personal observations and the statements of the patient. This document has been checked and approved by the attending provider.    Note: Chart documentation done in part with Dragon Voice Recognition software. Although reviewed after completion, some word and grammatical errors may remain.    12/27/2020   [facility] EMERGENCY DEPT     Oniel Hines,   12/27/20 [id number]

## 2020-12-29 LAB — COPATH REPORT: NORMAL

## 2020-12-31 ENCOUNTER — TELEPHONE (OUTPATIENT)
Dept: FAMILY MEDICINE | Facility: CLINIC | Age: 60
End: 2020-12-31

## 2020-12-31 NOTE — TELEPHONE ENCOUNTER
Patient called to schedule an appointment for a hospital follow-up or appeared on a report showing that they were recently discharged from the hospital.    Patient was admitted to [facility]:  12/27/2020  Discharged date: 12/28/2020  Reason for hospital admission:  Acute Appendicitis With Localized Peritonitis, Without Perforation, Abscess, Or Gangrene  Does patient have future appointment scheduled with provider? Yes Dr. Goodman  Date of future appointment:  1/4/2021      This information will be used to help the care team plan for the patients upcoming visit.  The triage RN may determine that a follow up call is necessary and reach out to the patient via the phone number listed in the chart.     Please route this message on routine priority to the Triage RN pool.

## 2020-12-31 NOTE — TELEPHONE ENCOUNTER
"Hospital/TCU/ED for chronic condition Discharge Protocol    \"Hi, my name is Gela Palm RN, a registered nurse, and I am calling from [facility] (formerly [facility]).  I am calling to follow up and see how things are going for you after your recent emergency visit/hospital/TCU stay.\"    Tell me how you are doing now that you are home?\" Better      Discharge Instructions    \"Let's review your discharge instructions.  What is/are the follow-up recommendations?  Pt. Response: Patient has a follow up appointment with Dr. Goodman    \"Has an appointment with your primary care provider been scheduled?\"   No (schedule appointment)    \"When you see the provider, I would recommend that you bring your medications with you.\"    Medications    \"Tell me what changed about your medicines when you discharged?\"    Changes to chronic meds?    2 or more - Epic MTM referral needed    \"What questions do you have about your medications?\"    None     New diagnoses of heart failure, COPD, diabetes, or MI?    No              Post Discharge Medication Reconciliation Status: discharge medications reconciled, continue medications without change.    Was MTM referral placed (*Make sure to put transitions as reason for referral)?   No    Call Summary    \"What questions or concerns do you have about your recent visit and your follow-up care?\"     none    \"If you have questions or things don't continue to improve, we encourage you contact us through the main clinic number (give number).  Even if the clinic is not open, triage nurses are available 24/7 to help you.     We would like you to know that our clinic has extended hours (provide information).  We also have urgent care (provide details on closest location and hours/contact info)\"      \"Thank you for your time and take care!\"     ALESIA Gamboa, RN           "

## 2021-01-04 ENCOUNTER — OFFICE VISIT (OUTPATIENT)
Dept: SURGERY | Facility: CLINIC | Age: 61
End: 2021-01-04
Payer: COMMERCIAL

## 2021-01-04 VITALS
SYSTOLIC BLOOD PRESSURE: 124 MMHG | BODY MASS INDEX: 37.43 KG/M2 | WEIGHT: 246.2 LBS | TEMPERATURE: 97.3 F | DIASTOLIC BLOOD PRESSURE: 60 MMHG

## 2021-01-04 DIAGNOSIS — Z98.890 POST-OPERATIVE STATE: Primary | ICD-10-CM

## 2021-01-04 PROCEDURE — 99024 POSTOP FOLLOW-UP VISIT: CPT | Performed by: SPECIALIST

## 2021-01-04 NOTE — LETTER
1/4/2021         RE: Eliseo Davis  [address] [facility] [id number]        Dear Colleague,    Thank you for referring your patient, Eliseo Davis, to the [facility]. Please see a copy of my visit note below.    Follow-up for lap appendectomy    Subjective:  Patient had a laparoscopic appendectomy done by Dr. Banks on 12/27.  Patient feels good no complaints    Objective:  B/P: 124/60, T: 97.3, P: Data Unavailable, R: Data Unavailable  Abdomen: Incisions are all healing well    Path -   SPECIMEN(S):   Appendix     FINAL DIAGNOSIS:   Appendix, laparoscopic appendectomy:   - Acute appendicitis and serositis.     Electronically signed out by:     Julio C Griffin M.D., PhD         Assessment/plan:  Patient status post laparoscopic appendectomy.  Doing well.  He will gradually increase his activity as tolerated follow-up with us as necessary.    Kiko Goodman MD, FACS      Again, thank you for allowing me to participate in the care of your patient.        Sincerely,        Kiko Goodman MD

## 2021-01-04 NOTE — DISCHARGE SUMMARY
[facility] Discharge Summary    Eliseo Davis MRN# [id number]   Age: 60 year old YOB: 1960     Date of Admission:  12/27/2020  Date of Discharge::  12/28/2020  9:55 AM  Admitting Physician:  Leeroy Banks DO  Discharge Physician:  Leeroy Banks DO            Admission Diagnoses:   Acute appendicitis with localized peritonitis, without perforation, abscess, or gangrene [K35.30]          Discharge Diagnosis:   * No pre-op diagnosis entered *          Procedures:   Procedure(s): Appendectomy       No procedures performed during this admission           Medications Prior to Admission:     No medications prior to admission.             Discharge Medications:     Discharge Medication List as of 12/28/2020  9:29 AM      START taking these medications    Details   acetaminophen (TYLENOL) 325 MG tablet Take 2 tablets (650 mg) by mouth every 6 hours as needed for mild pain Alternate with ibuprofen so you are taking one or the other every three hours. For example take tylenol at 5am, then ibuprofen at 8am, then tylenol at 11am, and so on., Disp-50 tablet , R-0, OTC      ondansetron (ZOFRAN-ODT) 4 MG ODT tab Take 1-2 tablets (4-8 mg) by mouth every 8 hours as needed for nausea, Disp-4 tablet, R-0, Local Print      oxyCODONE (ROXICODONE) 5 MG tablet Take 1-2 tablets (5-10 mg) by mouth every 4 hours as needed for moderate to severe pain, Disp-12 tablet, R-0, Local Print      senna-docusate (SENOKOT-S/PERICOLACE) 8.6-50 MG tablet Take 1-2 tablets by mouth 2 times daily, Disp-30 tablet, R-0, Local Print         CONTINUE these medications which have NOT CHANGED    Details   aclidinium (TUDORZA PRESSAIR) 400 MCG/ACT inhaler INHALE ONE PUFF BY MOUTH TWICE A DAY, Disp-1 Inhaler,R-0, E-Prescribe      albuterol (PROAIR HFA/PROVENTIL HFA/VENTOLIN HFA) 108 (90 BASE) MCG/ACT Inhaler Inhale 2 puffs into the lungs 4 times daily For 5 days, then use 2 puffs every 4 hours as needed, Disp-1 Inhaler, R-0, E-Prescribe       ALBUTEROL SULFATE IN Albuterol neb, twice daily, Historical      aspirin EC 81 MG EC tablet Take 1 tablet (81 mg) by mouth daily, Disp-30 tablet, R-3, E-Prescribe      atorvastatin (LIPITOR) 80 MG tablet Take 40 mg by mouth daily, Historical      bismuth subsalicylate (PEPTO BISMOL) 262 MG/15ML suspension Take 15 mLs by mouth every 6 hours as needed for indigestion, Historical      budesonide-formoterol (SYMBICORT) 80-4.5 MCG/ACT Inhaler Inhale 2 puffs into the lungs 2 times daily, Historical      fluticasone (FLONASE) 50 MCG/ACT nasal spray Spray 1 spray into both nostrils daily, Historical      ipratropium (ATROVENT HFA) 17 MCG/ACT inhaler Inhale 2 puffs into the lungs every 6 hours, Disp-1 Inhaler, R-1, E-Prescribe      lisinopril (PRINIVIL/ZESTRIL) 10 MG tablet 5 mg daily, Historical      omeprazole (PRILOSEC) 40 MG DR capsule Take 1 capsule (40 mg) by mouth 2 times daily (before meals), Disp-60 capsule, R-0, E-Prescribe      cetirizine (ZYRTEC) 10 MG tablet Take 1 tablet (10 mg) by mouth daily, Disp-30 tablet, R-0, E-Prescribe      predniSONE (DELTASONE) 20 MG tablet Take 1 tablet (20 mg) by mouth 2 times daily, Disp-10 tablet, R-0, E-Prescribe                   Consultations:   No consultations were requested during this admission          Brief History of Illness:   Reason for admission requiring a surgical or invasive procedure:   Abdominal pain   The patient underwent the following procedure(s):   Appendectomy   There were no immediate complications during this procedure.    Please refer to the full operative summary for details.           Hospital Course:   The patient's hospital course was unremarkable.  He recovered as anticipated and experienced no post-operative complications.           Discharge Instructions and Follow-Up:   Discharge diet: Regular   Discharge activity: Lifting restricted to 20 pounds   Discharge follow-up: Follow up with surgery in 1-2 weeks   Wound care: Ice to area for comfort            Discharge Disposition:   Discharged to home      Attestation:  I have reviewed today's vital signs, notes, medications, labs and imaging.    Leeroy Banks, DO

## 2021-01-04 NOTE — PROGRESS NOTES
Follow-up for lap appendectomy    Subjective:  Patient had a laparoscopic appendectomy done by Dr. Banks on 12/27.  Patient feels good no complaints    Objective:  B/P: 124/60, T: 97.3, P: Data Unavailable, R: Data Unavailable  Abdomen: Incisions are all healing well    Path -   SPECIMEN(S):   Appendix     FINAL DIAGNOSIS:   Appendix, laparoscopic appendectomy:   - Acute appendicitis and serositis.     Electronically signed out by:     Julio C Griffin M.D., PhD         Assessment/plan:  Patient status post laparoscopic appendectomy.  Doing well.  He will gradually increase his activity as tolerated follow-up with us as necessary.    Kiko Goodman MD, FACS

## 2021-06-07 NOTE — PROGRESS NOTES
Pt states he has had trouble with GERD in the past.  Has taken Zantac at home but not on a regular basis.  The drug was not found on his Mar.  Relayed this info to the doctor.  New orders received.   no

## 2022-02-11 ENCOUNTER — TELEPHONE (OUTPATIENT)
Dept: UROLOGY | Facility: CLINIC | Age: 62
End: 2022-02-11

## 2022-02-11 NOTE — TELEPHONE ENCOUNTER
Reason for Call:  Other call back    Detailed comments: [facility] - elevated PSA     Phone Number Patient can be reached at: Home number on file [phone] (home)    Best Time: any    Can we leave a detailed message on this number? YES    Call taken on 2/11/2022 at 12:44 PM by Odiila Aldridge

## 2022-05-18 ENCOUNTER — OFFICE VISIT (OUTPATIENT)
Dept: UROLOGY | Facility: CLINIC | Age: 62
End: 2022-05-18
Payer: COMMERCIAL

## 2022-05-18 VITALS
OXYGEN SATURATION: 96 % | SYSTOLIC BLOOD PRESSURE: 129 MMHG | HEART RATE: 64 BPM | TEMPERATURE: 97.6 F | WEIGHT: 230.6 LBS | DIASTOLIC BLOOD PRESSURE: 83 MMHG | BODY MASS INDEX: 35.06 KG/M2

## 2022-05-18 DIAGNOSIS — R97.20 ELEVATED PROSTATE SPECIFIC ANTIGEN (PSA): Primary | ICD-10-CM

## 2022-05-18 DIAGNOSIS — N40.1 BENIGN PROSTATIC HYPERPLASIA WITH LOWER URINARY TRACT SYMPTOMS, SYMPTOM DETAILS UNSPECIFIED: ICD-10-CM

## 2022-05-18 LAB — PSA SERPL-MCNC: 2.36 UG/L (ref 0–4)

## 2022-05-18 PROCEDURE — 36415 COLL VENOUS BLD VENIPUNCTURE: CPT | Performed by: UROLOGY

## 2022-05-18 PROCEDURE — 51798 US URINE CAPACITY MEASURE: CPT | Performed by: UROLOGY

## 2022-05-18 PROCEDURE — 84153 ASSAY OF PSA TOTAL: CPT | Performed by: UROLOGY

## 2022-05-18 PROCEDURE — 99203 OFFICE O/P NEW LOW 30 MIN: CPT | Mod: 25 | Performed by: UROLOGY

## 2022-05-18 ASSESSMENT — PAIN SCALES - GENERAL: PAINLEVEL: NO PAIN (0)

## 2022-05-18 NOTE — PROGRESS NOTES
Bladder Scan performed. 35 mL maximum residual urine detected after 3 scans. MD informed   Lindsay Plummer RN on 5/18/2022 at 1:36 PM

## 2022-05-18 NOTE — PROGRESS NOTES
S: Eliseo Davis is a pleasant  61 year old male who was requested to be seen by  [facility] for a consult with regard to patient's urinary complaints and elevated psa.  His psa has gone up from 1.3 to 7.9 this year.  Patient complains of Hesitancy in starting urinary stream, Sensation of incomplete emptying, Strain to Urinate, Nocturia x 3-4, Urgency, Frequency, Intermittency and slow urinary stream.  He has no history of elevated PSA.  Symptoms have been on going for   many years(s).  Seems to be worsened over time.  His AUA Symptom Score:  21.  His QOL score:  3.  He tried prostate medication in the past but stopped due to side effects (most likely an alpha blocker).  He has no dysuria or hematuria.  Recent UA was normal.      Current Outpatient Medications   Medication Sig Dispense Refill     acetaminophen (TYLENOL) 325 MG tablet Take 2 tablets (650 mg) by mouth every 6 hours as needed for mild pain Alternate with ibuprofen so you are taking one or the other every three hours. For example take tylenol at 5am, then ibuprofen at 8am, then tylenol at 11am, and so on. 50 tablet 0     aclidinium (TUDORZA PRESSAIR) 400 MCG/ACT inhaler INHALE ONE PUFF BY MOUTH TWICE A DAY 1 Inhaler 0     albuterol (PROAIR HFA/PROVENTIL HFA/VENTOLIN HFA) 108 (90 BASE) MCG/ACT Inhaler Inhale 2 puffs into the lungs 4 times daily For 5 days, then use 2 puffs every 4 hours as needed (Patient taking differently: Inhale 1-2 puffs into the lungs every 4 hours as needed For 5 days, then use 2 puffs every 4 hours as needed) 1 Inhaler 0     ALBUTEROL SULFATE IN Albuterol neb, twice daily       aspirin EC 81 MG EC tablet Take 1 tablet (81 mg) by mouth daily 30 tablet 3     atorvastatin (LIPITOR) 80 MG tablet Take 40 mg by mouth daily       bismuth subsalicylate (PEPTO BISMOL) 262 MG/15ML suspension Take 15 mLs by mouth every 6 hours as needed for indigestion       budesonide-formoterol (SYMBICORT) 80-4.5 MCG/ACT Inhaler Inhale 2  puffs into the lungs 2 times daily       fluticasone (FLONASE) 50 MCG/ACT nasal spray Spray 1 spray into both nostrils daily       ipratropium (ATROVENT HFA) 17 MCG/ACT inhaler Inhale 2 puffs into the lungs every 6 hours 1 Inhaler 1     lisinopril (PRINIVIL/ZESTRIL) 10 MG tablet 5 mg daily       omeprazole (PRILOSEC) 40 MG DR capsule Take 1 capsule (40 mg) by mouth 2 times daily (before meals) 60 capsule 0     ondansetron (ZOFRAN-ODT) 4 MG ODT tab Take 1-2 tablets (4-8 mg) by mouth every 8 hours as needed for nausea 4 tablet 0     oxyCODONE (ROXICODONE) 5 MG tablet Take 1-2 tablets (5-10 mg) by mouth every 4 hours as needed for moderate to severe pain (Patient not taking: Reported on 1/4/2021) 12 tablet 0     senna-docusate (SENOKOT-S/PERICOLACE) 8.6-50 MG tablet Take 1-2 tablets by mouth 2 times daily 30 tablet 0     Allergies   Allergen Reactions     Penicillins Anaphylaxis     Dust Mites      Pollen Extract      Past Medical History:   Diagnosis Date     CARDIOVASCULAR SCREENING; LDL GOAL LESS THAN 130 9/25/2013     Uncomplicated asthma      Past Surgical History:   Procedure Laterality Date     CORONARY ANGIOGRAPHY ADULT ORDER  06/22/2017     ESOPHAGOSCOPY, GASTROSCOPY, DUODENOSCOPY (EGD), COMBINED N/A 2/25/2019    Procedure: COMBINED ESOPHAGOSCOPY, GASTROSCOPY, DUODENOSCOPY (EGD), BIOPSY SINGLE OR MULTIPLE;  Surgeon: Emmanuel De La Fuente DO;  Location:  GI     LAPAROSCOPIC APPENDECTOMY N/A 12/27/2020    Procedure: APPENDECTOMY, LAPAROSCOPIC;  Surgeon: Leeroy Banks DO;  Location:  OR      Family History   Problem Relation Age of Onset     Cancer Mother      Cancer Father      He does not have a family history of prostate cancer.  Social History     Socioeconomic History     Marital status: Single     Spouse name: None     Number of children: None     Years of education: None     Highest education level: None   Tobacco Use     Smoking status: Former Smoker     Packs/day: 1.00     Years: 35.00      Pack years: 35.00     Types: Cigarettes     Smokeless tobacco: Former User     Quit date: 1/5/2017   Substance and Sexual Activity     Alcohol use: No     Comment: 2 drinks yesterday     Drug use: Yes     Types: Marijuana     Comment: 2-3 tiomes a year     Sexual activity: Not Currently        REVIEW OF SYSTEMS  =================  C: NEGATIVE for fever, chills, change in weight  I: NEGATIVE for worrisome rashes, moles or lesions  E/M: NEGATIVE for ear, mouth and throat problems  R: NEGATIVE for significant cough or SHORTNESS OF BREATH  CV:  NEGATIVE for chest pain, palpitations or peripheral edema  GI: NEGATIVE for nausea, abdominal pain, heartburn, or change in bowel habits  NEURO: NEGATIVE numbness/weakness  : see HPI  PSYCH: NEGATIVE depression/anxiety  LYmph: no new enlarged lymph nodes  Ortho: no new trauma/movements           O: Exam:/83 (BP Location: Right arm, Patient Position: Sitting, Cuff Size: Adult Regular)   Pulse 64   Temp 97.6  F (36.4  C) (Temporal)   Wt 104.6 kg (230 lb 9.6 oz)   SpO2 96%   BMI 35.06 kg/m     Constitutional: healthy, alert and no distress  Cardiovascular: negative, PMI normal.   Respiratory: negative, no evidence of respiratory distress  Gastrointestinal: Abdomen soft, non-tender. BS normal. No masses, organomegaly  : penis no discharge.  Testis no masses.  No scrotal skin lesion.  Prostate large apex only.  Musculoskeletal: extremities normal- no gross deformities noted, gait normal and normal muscle tone  Skin: no suspicious lesions or rashes  Neurologic: Alert and oriented  Psychiatric: mentation appears normal. and affect normal/bright  Hematologic/Lymphatic/Immunologic: normal ant/post cervical, axillary, supraclavicular and inguinal nodes    Assessment/Plan:   (R97.20) Elevated prostate specific antigen (PSA)  (primary encounter diagnosis)  Comment: discussed psa elevation/prostate cancer  Plan: PSA tumor marker today           Will call patient if psa remains  high    (N40.1) Benign prostatic hyperplasia with lower urinary tract symptoms, symptom details unspecified  Comment:  PVR < 50 ml  Plan: will f/u for treatment after psa testing.

## 2022-05-18 NOTE — PATIENT INSTRUCTIONS
Prostate Ultrasound Instructions  Dr. Byers  [address] [facility] [address]  [phone]        Take antibiotics as directed on label. START THE DAY OF THE BIOPSY   1 Fleets enema to be done 1    - 2 hours before procedure   NOTHING BY MOUTH AFTER THE ENEMA   If you are taking blood thinners (Aspirin, ibuprofen, Aleve) this is to be stopped ONE WEEK before the procedure. Tylenol is ok. If you are taking Coumadin, you must check with your primary doctor for further instructions  Please follow up in the office with the doctor ONE WEEK after the procedure to go over results      PROSTATE BIOPSY  Patient information:  You have had an examination of the prostate gland called a GENE (digital rectal examination) where a finger was inserted into your rectum to enable the doctor to feel your prostate gland. You may also have had a PSA (prostate specific antigen) blood test.  Sometimes an elevated PSA level and an abnormal GENE can be signs of prostate cancer. The doctor has recommended that you have a prostate biopsy.  What is a prostate biopsy:  You will be positioned for the biopsy as preferred by the physician.  An ultrasound probe is inserted into the rectum and a needle is then passed through the wall of the rectum and an injection of local anesthetic is given. Following the injection of local anesthetic, a needle will be inserted into the prostate gland to take a sample of cells. The doctor will usually take 12 separate biopsies.  You can hear some clicking sounds from the biopsy instrument whilst the biopsies are taken.  The procedure will take approximately five to ten minutes.   What are the risks?  Infection: As there is a chance of infection we give you antibiotics before the procedure to reduce the risk. However is you experience a fever, chills, nausea, or just not feeling up to 72 hours after the biopsy you need to go directly to the emergency room.   Antibiotics: The physician will send a prescription to  your preferred pharmacy.  We will call you once your culture results come back to let you that you can pick them up. You will follow the directions on the bottle.  Sometimes 2 different antibiotics are sent. Start the antibiotics on THE DAY OF THE BIOPSY.   Bleeding: Some men will see blood in the urine, semen and stool.  Bleeding can last for 6-8 weeks intermittently.  If you take any medication such as aspirin, warfarin, clopidogrel then the risk of bleeding will be higher.   Retention: Some men are unable to pass urine after the biopsy. This is called urine retention. This is very rare.  If you are unable to urinate please call our clinic or go to urgent care or the emergency room.   Getting the results:  The biopsy samples will be sent to a laboratory for examination by a pathologist.  It can take up to one week, sometimes longer, for your doctor to receive the results.  You will be given an appointment to return to clinic for the results of your biopsy within 1-2 weeks. If you do not receive an appointment for the results of the biopsy at the time of the scheduling, please contact the clinic to arrange an appointment for the results.   WE ARE NOT IN ANY CIRCUMSTANCES PERMITTED TO GIVE RESULTS OVER THE PHONE    What to expect following a biopsy:  You are advised to take it easy for the remainder of the day.  You may eat and drink as normal.  No restrictions to your normal daily routine.  As there is a risk of infection you will be given a course of antibiotics.  PLEASE COMPLETE THE FULL COURSE AS INSTRUCTED   Contact information:  Please call the clinic with any further questions [phone] *Do not leave an urgent message on this voicemail as we may not receive it until the following business day*

## 2022-05-25 ENCOUNTER — TELEPHONE (OUTPATIENT)
Dept: UROLOGY | Facility: CLINIC | Age: 62
End: 2022-05-25

## 2022-05-25 DIAGNOSIS — N40.1 BENIGN PROSTATIC HYPERPLASIA WITH LOWER URINARY TRACT SYMPTOMS, SYMPTOM DETAILS UNSPECIFIED: Primary | ICD-10-CM

## 2022-05-25 RX ORDER — FINASTERIDE 5 MG/1
5 TABLET, FILM COATED ORAL DAILY
Qty: 90 TABLET | Refills: 1 | Status: SHIPPED | OUTPATIENT
Start: 2022-05-25 | End: 2022-11-02

## 2022-05-25 NOTE — TELEPHONE ENCOUNTER
Proscar 5mg po every day was sent to patients preferred pharmacy. Called and left a VM for patient to review Dr. Byers's recommendations to wait for 6 months for Proscar treatment and then reassess at that time.    Katie MONTANO RN Urology 5/25/2022 1:42 PM     Routing to Dr. Marr.   Mary Armendariz RN

## 2022-05-25 NOTE — TELEPHONE ENCOUNTER
Patient is interested in trying medication. [facility] Will have RN send medication.    Patient also expressed interest in have a circumcision done. Will address with Dr. Byers if he is advising this procedure? And will call patient back to schedule if determined he is a candidate.  Yumiko Mcintyre CMA

## 2022-05-25 NOTE — TELEPHONE ENCOUNTER
----- Message from Alton Byers MD sent at 5/19/2022 12:59 PM CDT -----  Please CC patient of results.  If patient is interested in BPH treatment, start him on proscar 5 mg po day and see me in six months for recheck.

## 2022-07-15 ENCOUNTER — HOSPITAL ENCOUNTER (EMERGENCY)
Facility: CLINIC | Age: 62
Discharge: HOME | End: 2022-07-16
Attending: FAMILY MEDICINE | Admitting: FAMILY MEDICINE
Payer: COMMERCIAL

## 2022-07-15 DIAGNOSIS — S16.1XXA STRAIN OF NECK MUSCLE, INITIAL ENCOUNTER: ICD-10-CM

## 2022-07-15 DIAGNOSIS — K80.20 CALCULUS OF GALLBLADDER WITHOUT CHOLECYSTITIS WITHOUT OBSTRUCTION: ICD-10-CM

## 2022-07-15 DIAGNOSIS — R07.89 CHEST WALL PAIN: ICD-10-CM

## 2022-07-15 DIAGNOSIS — M94.0 COSTOCHONDRITIS: ICD-10-CM

## 2022-07-15 DIAGNOSIS — R51.9 ACUTE NONINTRACTABLE HEADACHE, UNSPECIFIED HEADACHE TYPE: ICD-10-CM

## 2022-07-15 LAB
BASOPHILS # BLD AUTO: 0.1 10E3/UL (ref 0–0.2)
BASOPHILS NFR BLD AUTO: 1 %
EOSINOPHIL # BLD AUTO: 0.6 10E3/UL (ref 0–0.7)
EOSINOPHIL NFR BLD AUTO: 4 %
ERYTHROCYTE [DISTWIDTH] IN BLOOD BY AUTOMATED COUNT: 13 % (ref 10–15)
HCT VFR BLD AUTO: 42.7 % (ref 40–53)
HGB BLD-MCNC: 14.6 G/DL (ref 13.3–17.7)
HOLD SPECIMEN: NORMAL
IMM GRANULOCYTES # BLD: 0.1 10E3/UL
IMM GRANULOCYTES NFR BLD: 0 %
LYMPHOCYTES # BLD AUTO: 2.6 10E3/UL (ref 0.8–5.3)
LYMPHOCYTES NFR BLD AUTO: 17 %
MCH RBC QN AUTO: 32.4 PG (ref 26.5–33)
MCHC RBC AUTO-ENTMCNC: 34.2 G/DL (ref 31.5–36.5)
MCV RBC AUTO: 95 FL (ref 78–100)
MONOCYTES # BLD AUTO: 1 10E3/UL (ref 0–1.3)
MONOCYTES NFR BLD AUTO: 7 %
NEUTROPHILS # BLD AUTO: 10.7 10E3/UL (ref 1.6–8.3)
NEUTROPHILS NFR BLD AUTO: 71 %
NRBC # BLD AUTO: 0 10E3/UL
NRBC BLD AUTO-RTO: 0 /100
PLATELET # BLD AUTO: 337 10E3/UL (ref 150–450)
RBC # BLD AUTO: 4.5 10E6/UL (ref 4.4–5.9)
WBC # BLD AUTO: 15.1 10E3/UL (ref 4–11)

## 2022-07-15 PROCEDURE — 93010 ELECTROCARDIOGRAM REPORT: CPT | Performed by: FAMILY MEDICINE

## 2022-07-15 PROCEDURE — 99285 EMERGENCY DEPT VISIT HI MDM: CPT | Mod: 25

## 2022-07-15 PROCEDURE — 85379 FIBRIN DEGRADATION QUANT: CPT | Performed by: FAMILY MEDICINE

## 2022-07-15 PROCEDURE — 85025 COMPLETE CBC W/AUTO DIFF WBC: CPT | Performed by: FAMILY MEDICINE

## 2022-07-15 PROCEDURE — 80053 COMPREHEN METABOLIC PANEL: CPT | Performed by: FAMILY MEDICINE

## 2022-07-15 PROCEDURE — 84484 ASSAY OF TROPONIN QUANT: CPT | Performed by: FAMILY MEDICINE

## 2022-07-15 PROCEDURE — 99285 EMERGENCY DEPT VISIT HI MDM: CPT | Mod: 25 | Performed by: FAMILY MEDICINE

## 2022-07-15 PROCEDURE — 93005 ELECTROCARDIOGRAM TRACING: CPT

## 2022-07-15 PROCEDURE — 250N000009 HC RX 250: Performed by: FAMILY MEDICINE

## 2022-07-15 PROCEDURE — 250N000013 HC RX MED GY IP 250 OP 250 PS 637: Performed by: FAMILY MEDICINE

## 2022-07-15 PROCEDURE — 96374 THER/PROPH/DIAG INJ IV PUSH: CPT | Mod: 59

## 2022-07-15 PROCEDURE — 36415 COLL VENOUS BLD VENIPUNCTURE: CPT | Performed by: FAMILY MEDICINE

## 2022-07-15 RX ORDER — ASPIRIN 81 MG/1
243 TABLET, CHEWABLE ORAL ONCE
Status: COMPLETED | OUTPATIENT
Start: 2022-07-15 | End: 2022-07-15

## 2022-07-15 RX ADMIN — ASPIRIN 81 MG 243 MG: 81 TABLET ORAL at 23:58

## 2022-07-15 RX ADMIN — LIDOCAINE HYDROCHLORIDE 30 ML: 20 SOLUTION ORAL; TOPICAL at 23:59

## 2022-07-16 ENCOUNTER — APPOINTMENT (OUTPATIENT)
Dept: CT IMAGING | Facility: CLINIC | Age: 62
End: 2022-07-16
Attending: FAMILY MEDICINE
Payer: COMMERCIAL

## 2022-07-16 ENCOUNTER — APPOINTMENT (OUTPATIENT)
Dept: ULTRASOUND IMAGING | Facility: CLINIC | Age: 62
End: 2022-07-16
Attending: FAMILY MEDICINE
Payer: COMMERCIAL

## 2022-07-16 VITALS
RESPIRATION RATE: 17 BRPM | SYSTOLIC BLOOD PRESSURE: 126 MMHG | WEIGHT: 230 LBS | BODY MASS INDEX: 34.97 KG/M2 | TEMPERATURE: 98 F | OXYGEN SATURATION: 98 % | HEART RATE: 87 BPM | DIASTOLIC BLOOD PRESSURE: 81 MMHG

## 2022-07-16 LAB
ALBUMIN SERPL-MCNC: 3.4 G/DL (ref 3.4–5)
ALP SERPL-CCNC: 97 U/L (ref 40–150)
ALT SERPL W P-5'-P-CCNC: 30 U/L (ref 0–70)
ANION GAP SERPL CALCULATED.3IONS-SCNC: 8 MMOL/L (ref 3–14)
AST SERPL W P-5'-P-CCNC: 13 U/L (ref 0–45)
BASOPHILS # BLD AUTO: 0.1 10E3/UL (ref 0–0.2)
BASOPHILS NFR BLD AUTO: 1 %
BILIRUB SERPL-MCNC: 0.4 MG/DL (ref 0.2–1.3)
BUN SERPL-MCNC: 12 MG/DL (ref 7–30)
CALCIUM SERPL-MCNC: 8.6 MG/DL (ref 8.5–10.1)
CHLORIDE BLD-SCNC: 111 MMOL/L (ref 94–109)
CO2 SERPL-SCNC: 23 MMOL/L (ref 20–32)
CREAT SERPL-MCNC: 1.23 MG/DL (ref 0.66–1.25)
D DIMER PPP FEU-MCNC: 0.72 UG/ML FEU (ref 0–0.5)
EOSINOPHIL # BLD AUTO: 0.4 10E3/UL (ref 0–0.7)
EOSINOPHIL NFR BLD AUTO: 3 %
ERYTHROCYTE [DISTWIDTH] IN BLOOD BY AUTOMATED COUNT: 13.2 % (ref 10–15)
GFR SERPL CREATININE-BSD FRML MDRD: 67 ML/MIN/1.73M2
GLUCOSE BLD-MCNC: 107 MG/DL (ref 70–99)
HCT VFR BLD AUTO: 42 % (ref 40–53)
HGB BLD-MCNC: 14.2 G/DL (ref 13.3–17.7)
HOLD SPECIMEN: NORMAL
HOLD SPECIMEN: NORMAL
IMM GRANULOCYTES # BLD: 0.1 10E3/UL
IMM GRANULOCYTES NFR BLD: 0 %
LYMPHOCYTES # BLD AUTO: 1.7 10E3/UL (ref 0.8–5.3)
LYMPHOCYTES NFR BLD AUTO: 11 %
MCH RBC QN AUTO: 32.3 PG (ref 26.5–33)
MCHC RBC AUTO-ENTMCNC: 33.8 G/DL (ref 31.5–36.5)
MCV RBC AUTO: 96 FL (ref 78–100)
MONOCYTES # BLD AUTO: 1.2 10E3/UL (ref 0–1.3)
MONOCYTES NFR BLD AUTO: 8 %
NEUTROPHILS # BLD AUTO: 11.4 10E3/UL (ref 1.6–8.3)
NEUTROPHILS NFR BLD AUTO: 77 %
NRBC # BLD AUTO: 0 10E3/UL
NRBC BLD AUTO-RTO: 0 /100
PLATELET # BLD AUTO: 332 10E3/UL (ref 150–450)
POTASSIUM BLD-SCNC: 3.7 MMOL/L (ref 3.4–5.3)
PROT SERPL-MCNC: 7.1 G/DL (ref 6.8–8.8)
RBC # BLD AUTO: 4.4 10E6/UL (ref 4.4–5.9)
SODIUM SERPL-SCNC: 142 MMOL/L (ref 133–144)
TROPONIN I SERPL HS-MCNC: 5 NG/L
WBC # BLD AUTO: 14.8 10E3/UL (ref 4–11)

## 2022-07-16 PROCEDURE — 85025 COMPLETE CBC W/AUTO DIFF WBC: CPT | Performed by: FAMILY MEDICINE

## 2022-07-16 PROCEDURE — 250N000011 HC RX IP 250 OP 636: Performed by: FAMILY MEDICINE

## 2022-07-16 PROCEDURE — 71275 CT ANGIOGRAPHY CHEST: CPT

## 2022-07-16 PROCEDURE — 76705 ECHO EXAM OF ABDOMEN: CPT

## 2022-07-16 PROCEDURE — 250N000009 HC RX 250: Performed by: FAMILY MEDICINE

## 2022-07-16 PROCEDURE — 36415 COLL VENOUS BLD VENIPUNCTURE: CPT | Performed by: FAMILY MEDICINE

## 2022-07-16 RX ORDER — IOPAMIDOL 755 MG/ML
500 INJECTION, SOLUTION INTRAVASCULAR ONCE
Status: COMPLETED | OUTPATIENT
Start: 2022-07-16 | End: 2022-07-16

## 2022-07-16 RX ORDER — KETOROLAC TROMETHAMINE 30 MG/ML
30 INJECTION, SOLUTION INTRAMUSCULAR; INTRAVENOUS ONCE
Status: COMPLETED | OUTPATIENT
Start: 2022-07-16 | End: 2022-07-16

## 2022-07-16 RX ADMIN — KETOROLAC TROMETHAMINE 30 MG: 30 INJECTION, SOLUTION INTRAMUSCULAR at 02:41

## 2022-07-16 RX ADMIN — IOPAMIDOL 75 ML: 755 INJECTION, SOLUTION INTRAVENOUS at 01:15

## 2022-07-16 RX ADMIN — SODIUM CHLORIDE 70 ML: 9 INJECTION, SOLUTION INTRAVENOUS at 01:14

## 2022-07-16 NOTE — DISCHARGE INSTRUCTIONS
Ibuprofen 600 mg and Tylenol 1000 mg every 6 hours as needed for pain.  You can take them at the same time.  Take with food so the Ibuprofen doesn't upset your stomach.  Continue your current medications.  Ice massage as demonstrated may be helpful.  Your heart tests were normal tonight which is reassuring.  Your chest pain appears to be coming from where your ribs connect to your sternum.  I suspect your neck pain and headache is due to a muscle strain when you jerked your head when the birds startled you.  They did notice a few small gallstones on your CT scan.  These do not appear to be causing any problems right now but if you notice abdominal pain especially in the right upper quadrant after you have been eating, particularly fatty foods, we may need to reevaluate that.  There was no sign of any gallbladder obstruction or inflammation/infection tonight.    Recheck in clinic with your primary physician if persistent problems.  Return to the ED if worse/concerns.  It was nice visiting with you again tonight.  I hope you feel better soon.    Thank you for choosing [facility]. We appreciate the opportunity to meet your urgent medical needs. Please let us know if we could have done anything to make your stay more satisfying.    After discharge, please closely monitor for any new or worsening symptoms. Return to the Emergency Department if you develop any acute worsening signs or symptoms.    If you had lab work, cultures or imaging studies done during your stay, the final results may still be pending. We will call you if your plan of care needs to change. However, if you are not improving as expected, please follow up with your primary care provider or clinic.     Start any prescription medications that were prescribed to you and take them as directed.     Please see additional handouts that may be pertinent to your condition.

## 2022-07-16 NOTE — ED TRIAGE NOTES
Pt presents with base neck pain in the back that started at 3pm and then at 6pm pt developed chest pain. Pt drove self to ER, pt clutching chest. 10/10 chest pain. Hx of MI 2 years ago.      Triage Assessment     Row Name 07/15/22 [id number]       Triage Assessment (Adult)    Airway WDL WDL       Respiratory WDL    Respiratory WDL WDL

## 2022-07-16 NOTE — ED PROVIDER NOTES
History     Chief Complaint   Patient presents with     Chest Pain     HPI  Eliseo Davis is a 61 year old male who presents to the ED with chest pain.  His pain started around 2 or 3:00 in the afternoon in the back of his head.  Then went down his neck into his back between his shoulder blades and then through to his chest.  Now feels like a squeezing sensation with a pleuritic component.  Hurts to take a big breath it also hurts to push on his chest.  States he had a heart attack a few years ago but looking back he had an angiogram back in June 2017 which just showed mild to moderate disease and was treated medically.  I cannot find that he had a true MI.  Was admitted overnight back in February 2019 with similar pain and ruled out for MI.  Was felt to have reflux and has been taking his PPI on a regular basis.  He took a baby aspirin and also some Tylenol earlier today and is also on medications for blood pressure and cholesterol.    Neck pain started when some birds flew up in front of him when he was out garage saling and startled him and he jerked his head back.  Pain is in the occiput bilaterally.        Allergies:  Allergies   Allergen Reactions     Penicillins Anaphylaxis     Dust Mites      Pollen Extract        Problem List:    Patient Active Problem List    Diagnosis Date Noted     Acute appendicitis with localized peritonitis, without perforation, abscess, or gangrene 12/28/2020     Priority: Medium     Obesity (BMI 35.0-39.9) with comorbidity (H) 07/31/2019     Priority: Medium     Atypical chest pain 02/16/2019     Priority: Medium     COPD (chronic obstructive pulmonary disease) (H) 02/16/2019     Priority: Medium     Hyperlipidemia LDL goal <100 02/16/2019     Priority: Medium     Gastroesophageal reflux disease 02/16/2019     Priority: Medium     Tachycardia 02/16/2019     Priority: Medium     ACS (acute coronary syndrome) (H) 06/22/2017     Priority: Medium     Urticaria 06/13/2017     Priority:  Medium     Neutrophilic leukocytosis 06/12/2017     Priority: Medium     Pulmonary nodule - 5 mm RUL (high risk pt) 06/12/2017     Priority: Medium     Reactive airway disease with wheezing with acute exacerbation 06/11/2017     Priority: Medium     Obesity 06/11/2017     Priority: Medium     CARDIOVASCULAR SCREENING; LDL GOAL LESS THAN 130 09/25/2013     Priority: Medium     Thoracic back pain 09/25/2013     Priority: Medium        Past Medical History:    Past Medical History:   Diagnosis Date     CARDIOVASCULAR SCREENING; LDL GOAL LESS THAN 130 9/25/2013     Uncomplicated asthma        Past Surgical History:    Past Surgical History:   Procedure Laterality Date     CORONARY ANGIOGRAPHY ADULT ORDER  06/22/2017     ESOPHAGOSCOPY, GASTROSCOPY, DUODENOSCOPY (EGD), COMBINED N/A 2/25/2019    Procedure: COMBINED ESOPHAGOSCOPY, GASTROSCOPY, DUODENOSCOPY (EGD), BIOPSY SINGLE OR MULTIPLE;  Surgeon: Emmanuel De La Fuente DO;  Location:  GI     LAPAROSCOPIC APPENDECTOMY N/A 12/27/2020    Procedure: APPENDECTOMY, LAPAROSCOPIC;  Surgeon: Leeroy Banks DO;  Location:  OR       Family History:    Family History   Problem Relation Age of Onset     Cancer Mother      Cancer Father        Social History:  Marital Status:  Single [1]  Social History     Tobacco Use     Smoking status: Former Smoker     Packs/day: 1.00     Years: 35.00     Pack years: 35.00     Types: Cigarettes     Smokeless tobacco: Former User     Quit date: 1/5/2017   Substance Use Topics     Alcohol use: No     Comment: 2 drinks yesterday     Drug use: Yes     Types: Marijuana     Comment: 2-3 tiomes a year        Medications:    acetaminophen (TYLENOL) 325 MG tablet  aclidinium (TUDORZA PRESSAIR) 400 MCG/ACT inhaler  albuterol (PROAIR HFA/PROVENTIL HFA/VENTOLIN HFA) 108 (90 BASE) MCG/ACT Inhaler  ALBUTEROL SULFATE IN  aspirin EC 81 MG EC tablet  atorvastatin (LIPITOR) 80 MG tablet  bismuth subsalicylate (PEPTO BISMOL) 262 MG/15ML  suspension  budesonide-formoterol (SYMBICORT) 80-4.5 MCG/ACT Inhaler  finasteride (PROSCAR) 5 MG tablet  fluticasone (FLONASE) 50 MCG/ACT nasal spray  ipratropium (ATROVENT HFA) 17 MCG/ACT inhaler  lisinopril (PRINIVIL/ZESTRIL) 10 MG tablet  omeprazole (PRILOSEC) 40 MG DR capsule  ondansetron (ZOFRAN-ODT) 4 MG ODT tab  oxyCODONE (ROXICODONE) 5 MG tablet  senna-docusate (SENOKOT-S/PERICOLACE) 8.6-50 MG tablet          Review of Systems   All other systems reviewed and are negative.      Physical Exam   BP: 132/76  Pulse: 100  Temp: 98  F (36.7  C)  Resp: 18  Weight: 104.3 kg (230 lb)  SpO2: 95 %      Physical Exam  Constitutional:       General: He is in acute distress (mild anxious).      Appearance: He is well-developed.   HENT:      Mouth/Throat:      Mouth: Mucous membranes are moist.      Pharynx: Oropharynx is clear.   Eyes:      Extraocular Movements: Extraocular movements intact.   Neck:      Meningeal: Kernig's sign absent.        Comments: Mild tenderness at the occiput bilaterally down to the paraspinous musculature into the traps.  No meningismus.  Cardiovascular:      Rate and Rhythm: Normal rate and regular rhythm.   Pulmonary:      Effort: Pulmonary effort is normal.      Breath sounds: Normal breath sounds.   Chest:      Chest wall: Tenderness (R>L parasternal) present.   Abdominal:      Palpations: Abdomen is soft.      Tenderness: There is no abdominal tenderness.   Musculoskeletal:         General: No tenderness. Normal range of motion.      Cervical back: Full passive range of motion without pain. No rigidity. Muscular tenderness (bilateral para/traps, mild) present.      Right lower leg: No edema.      Left lower leg: No edema.   Skin:     General: Skin is warm and dry.   Neurological:      General: No focal deficit present.      Mental Status: He is alert and oriented to person, place, and time.   Psychiatric:         Mood and Affect: Mood is anxious.         ED Course                  Procedures              EKG Interpretation:      Interpreted by Dhiraj Bryan MD  Time reviewed: 2330  Symptoms at time of EKG: chest pain   Rhythm: normal sinus   Rate: normal  Axis: normal  Ectopy: none  Conduction: normal  ST Segments/ T Waves: No ST-T wave changes  Q Waves: none  Comparison to prior: Unchanged    Clinical Impression: normal EKG          Critical Care time:  none               Results for orders placed or performed during the hospital encounter of 07/15/22 (from the past 24 hour(s))   CBC with Platelets & Differential    Narrative    The following orders were created for panel order CBC with Platelets & Differential.  Procedure                               Abnormality         Status                     ---------                               -----------         ------                     CBC with platelets and d...[[id number]]  Abnormal            Final result                 Please view results for these tests on the individual orders.   Troponin I   Result Value Ref Range    Troponin I High Sensitivity 5 <79 ng/L   [place] Draw    Narrative    The following orders were created for panel order [place] Draw.  Procedure                               Abnormality         Status                     ---------                               -----------         ------                     Extra Blue Top Tube[[id number]]                              Final result               Extra Green Top (Lithium...[[id number]]                      Final result                 Please view results for these tests on the individual orders.   CBC with platelets and differential   Result Value Ref Range    WBC Count 15.1 (H) 4.0 - 11.0 10e3/uL    RBC Count 4.50 4.40 - 5.90 10e6/uL    Hemoglobin 14.6 13.3 - 17.7 g/dL    Hematocrit 42.7 40.0 - 53.0 %    MCV 95 78 - 100 fL    MCH 32.4 26.5 - 33.0 pg    MCHC 34.2 31.5 - 36.5 g/dL    RDW 13.0 10.0 - 15.0 %    Platelet Count 337 150 - 450 10e3/uL    % Neutrophils 71 %     % Lymphocytes 17 %    % Monocytes 7 %    % Eosinophils 4 %    % Basophils 1 %    % Immature Granulocytes 0 %    NRBCs per 100 WBC 0 <1 /100    Absolute Neutrophils 10.7 (H) 1.6 - 8.3 10e3/uL    Absolute Lymphocytes 2.6 0.8 - 5.3 10e3/uL    Absolute Monocytes 1.0 0.0 - 1.3 10e3/uL    Absolute Eosinophils 0.6 0.0 - 0.7 10e3/uL    Absolute Basophils 0.1 0.0 - 0.2 10e3/uL    Absolute Immature Granulocytes 0.1 <=0.4 10e3/uL    Absolute NRBCs 0.0 10e3/uL   Extra Blue Top Tube   Result Value Ref Range    Hold Specimen JIC    Extra Green Top (Lithium Heparin) Tube   Result Value Ref Range    Hold Specimen JIC    [place] Draw    Narrative    The following orders were created for panel order [place] Draw.  Procedure                               Abnormality         Status                     ---------                               -----------         ------                     Extra Green Top (Lithium...[[id number]]                      Final result                 Please view results for these tests on the individual orders.   Extra Green Top (Lithium Heparin) ON ICE   Result Value Ref Range    Hold Specimen     Comprehensive metabolic panel   Result Value Ref Range    Sodium 142 133 - 144 mmol/L    Potassium 3.7 3.4 - 5.3 mmol/L    Chloride 111 (H) 94 - 109 mmol/L    Carbon Dioxide (CO2) 23 20 - 32 mmol/L    Anion Gap 8 3 - 14 mmol/L    Urea Nitrogen 12 7 - 30 mg/dL    Creatinine 1.23 0.66 - 1.25 mg/dL    Calcium 8.6 8.5 - 10.1 mg/dL    Glucose 107 (H) 70 - 99 mg/dL    Alkaline Phosphatase 97 40 - 150 U/L    AST 13 0 - 45 U/L    ALT 30 0 - 70 U/L    Protein Total 7.1 6.8 - 8.8 g/dL    Albumin 3.4 3.4 - 5.0 g/dL    Bilirubin Total 0.4 0.2 - 1.3 mg/dL    GFR Estimate 67 >60 mL/min/1.73m2   D dimer quantitative   Result Value Ref Range    D-Dimer Quantitative 0.72 (H) 0.00 - 0.50 ug/mL FEU    Narrative    This D-dimer assay is intended for use in conjunction with a clinical pretest probability assessment model to exclude  pulmonary embolism (PE) and deep venous thrombosis (DVT) in outpatients suspected of PE or DVT. The cut-off value is 0.50 ug/mL FEU.   CT Chest Pulmonary Embolism w Contrast    Narrative    EXAM: CT CHEST PULMONARY EMBOLISM W CONTRAST  LOCATION: [facility]  DATE/TIME: 7/16/2022 1:14 AM    INDICATION: Chest pain, elevated D-dimer, some pleuritic component but also pain between shoulder blades and through to chest.  COMPARISON: 12/27/2020  TECHNIQUE: CT chest pulmonary angiogram during arterial phase injection of IV contrast. Multiplanar reformats and MIP reconstructions were performed. Dose reduction techniques were used.   CONTRAST: 75 mL, Isovue 370    FINDINGS:  ANGIOGRAM CHEST: Pulmonary arteries are normal caliber and negative for pulmonary emboli. Thoracic aorta is negative for dissection. No CT evidence of right heart strain.    LUNGS AND PLEURA: Mild paraseptal emphysematous changes in the upper lungs. Patchy areas of subpleural consolidation and linear atelectasis in the lower lungs. Clinical correlation excluding signs of pneumonitis. There is some generalized bronchial wall   thickening in the lower lungs compatible with bronchiolitis. No pleural effusions.    MEDIASTINUM/AXILLAE: Calcified right hilar and mediastinal nodes compatible with prior granulomatous disease. Otherwise no significant adenopathy. Normal heart size. No pericardial effusion. Normal caliber thoracic aorta. Small hiatal hernia.    CORONARY ARTERY CALCIFICATION: Mild.    UPPER ABDOMEN: Cholelithiasis. Calcified splenic granulomas.    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 Moderate degenerative changes thoracic spine.      Impression    IMPRESSION:  1.  Negative for pulmonary embolism.    2.  Patchy areas of subpleural consolidation in the lower lungs. Much of this is probably due to scarring and/or atelectasis but clinical correlation recommended to exclude any signs of pneumonitis.    3.   Generalized bronchial wall thickening in the lower lungs compatible with bronchiolitis.    4.  Evidence of prior granulomatous disease.    5.  Cholelithiasis.    6.  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   Abdomen US, limited (RUQ only)    Narrative    EXAM: US ABDOMEN LIMITED  LOCATION: [facility]  DATE/TIME: 7/16/2022 2:27 AM    INDICATION: Chest pain, cholelithiasis seen on CT scan.  COMPARISON: CT scan from 7/16/2022.  TECHNIQUE: Limited abdominal ultrasound.    FINDINGS:    GALLBLADDER: The small gallstone seen in the gallbladder on the CT scan is not well appreciated on ultrasound. No gallbladder wall thickening. Sonographic Brown sign is negative. No evidence for cholecystitis.    BILE DUCTS: No biliary dilatation. The common duct measures 4 mm.    LIVER: Normal parenchyma with smooth contour. No focal mass.    RIGHT KIDNEY: No hydronephrosis.    PANCREAS: Obscured by overlying bowel gas but negative where seen.    No ascites.      Impression    IMPRESSION:  1.  The small gallstone seen in the gallbladder on CT not well seen on ultrasound. No evidence for cholecystitis.     2.  No biliary dilatation.    3.  Limited visualization of the pancreas due to overlying bowel gas. Otherwise unremarkable right upper quadrant ultrasound.       CBC with platelets differential    Narrative    The following orders were created for panel order CBC with platelets differential.  Procedure                               Abnormality         Status                     ---------                               -----------         ------                     CBC with platelets and d...[[id number]]  Abnormal            Final result                 Please view results for these tests on the individual orders.   CBC with platelets and differential   Result Value Ref Range    WBC Count 14.8 (H) 4.0 - 11.0 10e3/uL    RBC Count 4.40 4.40 - 5.90 10e6/uL    Hemoglobin 14.2 13.3 - 17.7 g/dL    Hematocrit 42.0 40.0 -  53.0 %    MCV 96 78 - 100 fL    MCH 32.3 26.5 - 33.0 pg    MCHC 33.8 31.5 - 36.5 g/dL    RDW 13.2 10.0 - 15.0 %    Platelet Count 332 150 - 450 10e3/uL    % Neutrophils 77 %    % Lymphocytes 11 %    % Monocytes 8 %    % Eosinophils 3 %    % Basophils 1 %    % Immature Granulocytes 0 %    NRBCs per 100 WBC 0 <1 /100    Absolute Neutrophils 11.4 (H) 1.6 - 8.3 10e3/uL    Absolute Lymphocytes 1.7 0.8 - 5.3 10e3/uL    Absolute Monocytes 1.2 0.0 - 1.3 10e3/uL    Absolute Eosinophils 0.4 0.0 - 0.7 10e3/uL    Absolute Basophils 0.1 0.0 - 0.2 10e3/uL    Absolute Immature Granulocytes 0.1 <=0.4 10e3/uL    Absolute NRBCs 0.0 10e3/uL       Medications   aspirin (ASA) chewable tablet 243 mg (243 mg Oral Given 7/15/22 2358)   lidocaine (viscous) (XYLOCAINE) 2 % 15 mL, alum & mag hydroxide-simethicone (MAALOX) 15 mL GI Cocktail (30 mLs Oral Given 7/15/22 2359)   Saline 100mL Bag (70 mLs Intravenous Given 7/16/22 0114)   iopamidol (ISOVUE-370) solution 500 mL (75 mLs Intravenous Given 7/16/22 0115)   ketorolac (TORADOL) injection 30 mg (30 mg Intravenous Given 7/16/22 0241)       Assessments & Plan (with Medical Decision Making)  61-year-old with several cardiac risk factors and with chest discomfort which feels like a squeezing sensation.  Started in the back of his head down his back between his shoulder blades and into his chest.  Some pleuritic component as well.  Similar episode back in February 2019 which ruled out for MI and felt to be related to reflux.  He is on a PPI and also takes medication for cholesterol and blood pressure control.  Initial EKG showed no acute ischemic changes.  He took a baby aspirin earlier today so was given 243 mg for a complete load.  GI cocktail given as he does have a history of reflux.  Troponin was normal.  D-dimer was elevated 0.72.  Chest CT was negative for PE or aortic dissection.  They did note cholelithiasis.  LFTs were normal.  White count up at 15.1.  Right upper quadrant ultrasound  ordered to rule out cholecystitis.  RUQ ultrasound showed no evidence of cholecystitis.  The small gallstones seen on CT were not well seen on the ultrasound.  No biliary ductal dilatation or gallbladder wall thickening.  Negative sonographic Brown sign.  He did not realize any relief with the IV Toradol or the GI cocktail.  Still has the neck pain.  Trigger point injections with 0.5% bupivacaine plain at bilateral occiput gave him good relief.  Think his chest pain is most likely due to costochondritis as he has significant tenderness with palpation along both sternal borders right greater than left.  Neck pain seems to be musculoskeletal in origin likely from strain when he startled when the birds flew up in front of him and he jerked his head back.  No meningeal signs.  His cardiac work-up was unremarkable there was no sign of aortic dissection or other abnormalities on his chest CT.  He does have asymptomatic gallstones.  Tylenol/ibuprofen as needed for pain.  Ice massage may be helpful.  We discussed reportable signs and when to return.  Verbal and written discharge instructions given.  He is comfortable with this plan.     I have reviewed the nursing notes.    I have reviewed the findings, diagnosis, plan and need for follow up with the patient.       New Prescriptions    No medications on file       Final diagnoses:   Chest wall pain   Costochondritis - R>L   Strain of neck muscle, initial encounter   Acute nonintractable headache, unspecified headache type   Calculus of gallbladder without cholecystitis without obstruction       7/15/2022   [facility] EMERGENCY DEPT     Dhiraj Bryan MD  07/16/22 [id number]

## 2022-08-31 ENCOUNTER — OFFICE VISIT (OUTPATIENT)
Dept: UROLOGY | Facility: CLINIC | Age: 62
End: 2022-08-31
Payer: COMMERCIAL

## 2022-08-31 VITALS
HEART RATE: 80 BPM | BODY MASS INDEX: 36.26 KG/M2 | DIASTOLIC BLOOD PRESSURE: 72 MMHG | OXYGEN SATURATION: 95 % | SYSTOLIC BLOOD PRESSURE: 121 MMHG | WEIGHT: 238.5 LBS

## 2022-08-31 DIAGNOSIS — N40.1 BENIGN PROSTATIC HYPERPLASIA WITH LOWER URINARY TRACT SYMPTOMS, SYMPTOM DETAILS UNSPECIFIED: ICD-10-CM

## 2022-08-31 DIAGNOSIS — R97.20 ELEVATED PROSTATE SPECIFIC ANTIGEN (PSA): Primary | ICD-10-CM

## 2022-08-31 PROCEDURE — 99213 OFFICE O/P EST LOW 20 MIN: CPT | Performed by: UROLOGY

## 2022-08-31 ASSESSMENT — PAIN SCALES - GENERAL: PAINLEVEL: NO PAIN (0)

## 2022-08-31 NOTE — PROGRESS NOTES
Chief Complaint   Patient presents with     RECHECK     PSA       Eliseo Davis is a 61 year old male who presents today for follow up of   Chief Complaint   Patient presents with     RECHECK     PSA    f/u for benign prostatic hyperplasia/elevated psa.  His psa has normalized to 2.36.  LUTS are better since starting on proscar.    Current Outpatient Medications   Medication Sig Dispense Refill     acetaminophen (TYLENOL) 325 MG tablet Take 2 tablets (650 mg) by mouth every 6 hours as needed for mild pain Alternate with ibuprofen so you are taking one or the other every three hours. For example take tylenol at 5am, then ibuprofen at 8am, then tylenol at 11am, and so on. 50 tablet 0     aclidinium (TUDORZA PRESSAIR) 400 MCG/ACT inhaler INHALE ONE PUFF BY MOUTH TWICE A DAY 1 Inhaler 0     albuterol (PROAIR HFA/PROVENTIL HFA/VENTOLIN HFA) 108 (90 BASE) MCG/ACT Inhaler Inhale 2 puffs into the lungs 4 times daily For 5 days, then use 2 puffs every 4 hours as needed (Patient taking differently: Inhale 1-2 puffs into the lungs every 4 hours as needed For 5 days, then use 2 puffs every 4 hours as needed) 1 Inhaler 0     ALBUTEROL SULFATE IN Albuterol neb, twice daily       aspirin EC 81 MG EC tablet Take 1 tablet (81 mg) by mouth daily 30 tablet 3     atorvastatin (LIPITOR) 80 MG tablet Take 40 mg by mouth daily       bismuth subsalicylate (PEPTO BISMOL) 262 MG/15ML suspension Take 15 mLs by mouth every 6 hours as needed for indigestion       budesonide-formoterol (SYMBICORT) 80-4.5 MCG/ACT Inhaler Inhale 2 puffs into the lungs 2 times daily       finasteride (PROSCAR) 5 MG tablet Take 1 tablet (5 mg) by mouth daily 90 tablet 1     fluticasone (FLONASE) 50 MCG/ACT nasal spray Spray 1 spray into both nostrils daily       ipratropium (ATROVENT HFA) 17 MCG/ACT inhaler Inhale 2 puffs into the lungs every 6 hours 1 Inhaler 1     lisinopril (PRINIVIL/ZESTRIL) 10 MG tablet 5 mg daily       omeprazole (PRILOSEC) 40 MG DR capsule  Take 1 capsule (40 mg) by mouth 2 times daily (before meals) 60 capsule 0     ondansetron (ZOFRAN-ODT) 4 MG ODT tab Take 1-2 tablets (4-8 mg) by mouth every 8 hours as needed for nausea 4 tablet 0     oxyCODONE (ROXICODONE) 5 MG tablet Take 1-2 tablets (5-10 mg) by mouth every 4 hours as needed for moderate to severe pain (Patient not taking: Reported on 1/4/2021) 12 tablet 0     senna-docusate (SENOKOT-S/PERICOLACE) 8.6-50 MG tablet Take 1-2 tablets by mouth 2 times daily 30 tablet 0     Allergies   Allergen Reactions     Penicillins Anaphylaxis     Dust Mites      Pollen Extract       Past Medical History:   Diagnosis Date     CARDIOVASCULAR SCREENING; LDL GOAL LESS THAN 130 9/25/2013     Uncomplicated asthma      Past Surgical History:   Procedure Laterality Date     CORONARY ANGIOGRAPHY ADULT ORDER  06/22/2017     ESOPHAGOSCOPY, GASTROSCOPY, DUODENOSCOPY (EGD), COMBINED N/A 2/25/2019    Procedure: COMBINED ESOPHAGOSCOPY, GASTROSCOPY, DUODENOSCOPY (EGD), BIOPSY SINGLE OR MULTIPLE;  Surgeon: Emmanuel De La Fuente DO;  Location: [facility]     LAPAROSCOPIC APPENDECTOMY N/A 12/27/2020    Procedure: APPENDECTOMY, LAPAROSCOPIC;  Surgeon: Leeroy Banks DO;  Location: [facility] OR     Family History   Problem Relation Age of Onset     Cancer Mother      Cancer Father      Social History     Socioeconomic History     Marital status: Single     Spouse name: None     Number of children: None     Years of education: None     Highest education level: None   Tobacco Use     Smoking status: Former Smoker     Packs/day: 1.00     Years: 35.00     Pack years: 35.00     Types: Cigarettes     Smokeless tobacco: Former User     Quit date: 1/5/2017   Substance and Sexual Activity     Alcohol use: No     Comment: 2 drinks yesterday     Drug use: Yes     Types: Marijuana     Comment: 2-3 tiomes a year     Sexual activity: Not Currently       REVIEW OF SYSTEMS  =================  C: NEGATIVE for fever, chills, change in weight  I: NEGATIVE  for worrisome rashes, moles or lesions  E/M: NEGATIVE for ear, mouth and throat problems  R: NEGATIVE for significant cough or SHORTNESS OF BREATH  CV:  NEGATIVE for chest pain, palpitations or peripheral edema  GI: NEGATIVE for nausea, abdominal pain, heartburn, or change in bowel habits  NEURO: NEGATIVE numbness/weakness  : see HPI  PSYCH: NEGATIVE depression/anxiety  LYmph: no new enlarged lymph nodes  Ortho: no new trauma/movements    Physical Exam:  Blood pressure 121/72, pulse 80, weight 108.2 kg (238 lb 8 oz), SpO2 95 %.    GENERAL: healthy, alert and no distress  EYES: Eyes grossly normal to inspection, conjunctivae and sclerae normal  RESP: no audible wheeze, cough, or visible cyanosis.  No visible retractions or increased work of breathing.  Able to speak fully in complete sentences.  NEURO: Cranial nerves grossly intact, mentation intact and speech normal  PSYCH: mentation appears normal, affect normal/bright, judgement and insight intact, normal speech and appearance well-groomed    Assessment/Plan:   (R97.20) Elevated prostate specific antigen (PSA)  (primary encounter diagnosis)  Comment:    Plan: check annually per primary MD    (N40.1) Benign prostatic hyperplasia with lower urinary tract symptoms, symptom details unspecified  Comment:  Doing better   Plan: cont with proscar.

## 2022-11-02 DIAGNOSIS — N40.1 BENIGN PROSTATIC HYPERPLASIA WITH LOWER URINARY TRACT SYMPTOMS, SYMPTOM DETAILS UNSPECIFIED: ICD-10-CM

## 2022-11-02 RX ORDER — FINASTERIDE 5 MG/1
5 TABLET, FILM COATED ORAL DAILY
Qty: 90 TABLET | Refills: 1 | Status: SHIPPED | OUTPATIENT
Start: 2022-11-02 | End: 2023-04-18

## 2022-11-02 NOTE — TELEPHONE ENCOUNTER
Chief Complaint   Patient presents with     Refill Request     Finasteride      Refill request received via fax by pharmacy     Pending Prescriptions:                       Disp   Refills    finasteride (PROSCAR) 5 MG tablet         90 tab*1            Sig: Take 1 tablet (5 mg) by mouth daily         Last Written Prescription Date:  5/25/22  Last Fill Quantity: ,   # refills: 1  Last Office Visit with prescribing provider: 8/31/22  Future Office visit:

## 2023-04-18 DIAGNOSIS — N40.1 BENIGN PROSTATIC HYPERPLASIA WITH LOWER URINARY TRACT SYMPTOMS, SYMPTOM DETAILS UNSPECIFIED: ICD-10-CM

## 2023-04-18 RX ORDER — FINASTERIDE 5 MG/1
5 TABLET, FILM COATED ORAL DAILY
Qty: 90 TABLET | Refills: 1 | Status: SHIPPED | OUTPATIENT
Start: 2023-04-18

## 2023-07-07 ENCOUNTER — TELEPHONE (OUTPATIENT)
Dept: NEUROSURGERY | Facility: CLINIC | Age: 63
End: 2023-07-07

## 2023-07-07 NOTE — TELEPHONE ENCOUNTER
Reason for Call:  Form, our goal is to have forms completed with 72 hours, however, some forms may require a visit or additional information.    Type of letter, form or note:  [facility] Ref[facility]     [facility] the [facility] (if other please explain)    Where did the form come from: form was faxed in    What clinic location was the form placed at?: [facility]    Where the form was placed: Given to MA/RN    What number is listed as a contact on the form?: [phone]       Additional comments:     Call taken on 7/7/2023 at 8:44 AM by Tea Leyva

## 2023-07-10 NOTE — TELEPHONE ENCOUNTER
Referral is for respiratory therapy. Referral faxed to [phone].    Kelly Laura RN on 7/10/2023 at 4:19 PM

## 2023-07-12 ENCOUNTER — TRANSCRIBE ORDERS (OUTPATIENT)
Dept: OTHER | Age: 63
End: 2023-07-12

## 2023-07-12 DIAGNOSIS — J44.9 CHRONIC OBSTRUCTIVE PULMONARY DISEASE, UNSPECIFIED (H): Primary | ICD-10-CM

## 2023-07-19 ENCOUNTER — HOSPITAL ENCOUNTER (OUTPATIENT)
Dept: CARDIAC REHAB | Facility: CLINIC | Age: 63
Discharge: HOME | End: 2023-07-19
Attending: STUDENT IN AN ORGANIZED HEALTH CARE EDUCATION/TRAINING PROGRAM | Admitting: STUDENT IN AN ORGANIZED HEALTH CARE EDUCATION/TRAINING PROGRAM
Payer: COMMERCIAL

## 2023-07-19 DIAGNOSIS — J44.9 CHRONIC OBSTRUCTIVE PULMONARY DISEASE, UNSPECIFIED (H): ICD-10-CM

## 2023-07-19 PROCEDURE — 94625 PHY/QHP OP PULM RHB W/O MNTR: CPT

## 2023-07-21 ENCOUNTER — HOSPITAL ENCOUNTER (OUTPATIENT)
Dept: RESPIRATORY THERAPY | Facility: CLINIC | Age: 63
Discharge: HOME | End: 2023-07-21
Attending: UROLOGY | Admitting: UROLOGY
Payer: COMMERCIAL

## 2023-07-21 DIAGNOSIS — J44.9 COPD (CHRONIC OBSTRUCTIVE PULMONARY DISEASE) (H): ICD-10-CM

## 2023-07-21 DIAGNOSIS — R91.1 PULMONARY NODULE: Primary | ICD-10-CM

## 2023-07-21 DIAGNOSIS — R06.00 DYSPNEA: ICD-10-CM

## 2023-07-21 PROCEDURE — 999N000156 HC STATISTIC RCP CONSULT EA 30 MIN

## 2023-07-21 PROCEDURE — 94726 PLETHYSMOGRAPHY LUNG VOLUMES: CPT

## 2023-07-21 PROCEDURE — 94729 DIFFUSING CAPACITY: CPT

## 2023-07-21 PROCEDURE — 94060 EVALUATION OF WHEEZING: CPT

## 2023-07-21 PROCEDURE — 999N000157 HC STATISTIC RCP TIME EA 10 MIN

## 2023-07-21 NOTE — DISCHARGE INSTRUCTIONS
Thank you for completing pulmonary function testing today.  All results will be scanned into your epic results for your doctor to review.  Please resume taking all your current prescribed medications and diet as directed by your provider.   If you have not heard from your provider about your testing within two weeks and do not have a follow-up appointment scheduled with them please contact your provider about any questions you have concerning your testing.   Thank you  [facility] Thank you for completing pulmonary function testing today.  All results will be scanned into your epic results for your doctor to review.  Please resume taking all your current prescribed medications and diet as directed by your provider.   If you have not heard from your provider about your testing within two weeks and do not have a follow-up appointment scheduled with them please contact your provider about any questions you have concerning your testing.   Thank you  [facility]

## 2023-07-21 NOTE — PROGRESS NOTES
..Pre and post spirometry, DLCO, and Lung volumes completed.  Patient was given Albuterol MDI before post spirometry.

## 2023-07-24 LAB
DLCOUNC-%PRED-PRE: 86 %
DLCOUNC-PRE: 22.35 ML/MIN/MMHG
DLCOUNC-PRED: 25.86 ML/MIN/MMHG
ERV-%PRED-PRE: 24 %
ERV-PRE: 0.29 L
ERV-PRED: 1.2 L
EXPTIME-PRE: 11.71 SEC
FEF2575-%PRED-POST: 46 %
FEF2575-%PRED-PRE: 44 %
FEF2575-POST: 1.24 L/SEC
FEF2575-PRE: 1.17 L/SEC
FEF2575-PRED: 2.65 L/SEC
FEFMAX-%PRED-PRE: 68 %
FEFMAX-PRE: 5.94 L/SEC
FEFMAX-PRED: 8.7 L/SEC
FEV1-%PRED-PRE: 72 %
FEV1-PRE: 2.23 L
FEV1FEV6-PRE: 66 %
FEV1FEV6-PRED: 79 %
FEV1FVC-PRE: 64 %
FEV1FVC-PRED: 79 %
FEV1SVC-PRE: 63 %
FEV1SVC-PRED: 66 %
FIFMAX-PRE: 1.86 L/SEC
FRCPLETH-%PRED-PRE: 130 %
FRCPLETH-PRE: 4.42 L
FRCPLETH-PRED: 3.39 L
FVC-%PRED-PRE: 87 %
FVC-PRE: 3.46 L
FVC-PRED: 3.95 L
IC-%PRED-PRE: 95 %
IC-PRE: 3.27 L
IC-PRED: 3.42 L
RVPLETH-%PRED-PRE: 196 %
RVPLETH-PRE: 4.14 L
RVPLETH-PRED: 2.1 L
TLCPLETH-%PRED-PRE: 114 %
TLCPLETH-PRE: 7.7 L
TLCPLETH-PRED: 6.74 L
VA-%PRED-PRE: 89 %
VA-PRE: 5.44 L
VC-%PRED-PRE: 75 %
VC-PRE: 3.56 L
VC-PRED: 4.71 L

## 2023-07-25 ENCOUNTER — HOSPITAL ENCOUNTER (OUTPATIENT)
Dept: CARDIAC REHAB | Facility: CLINIC | Age: 63
Discharge: HOME | End: 2023-07-25
Attending: STUDENT IN AN ORGANIZED HEALTH CARE EDUCATION/TRAINING PROGRAM | Admitting: STUDENT IN AN ORGANIZED HEALTH CARE EDUCATION/TRAINING PROGRAM
Payer: COMMERCIAL

## 2023-07-25 PROCEDURE — 94625 PHY/QHP OP PULM RHB W/O MNTR: CPT

## 2025-03-09 ENCOUNTER — HOSPITAL ENCOUNTER (EMERGENCY)
Facility: CLINIC | Age: 65
Discharge: HOME | End: 2025-03-10
Attending: STUDENT IN AN ORGANIZED HEALTH CARE EDUCATION/TRAINING PROGRAM
Payer: COMMERCIAL

## 2025-03-09 DIAGNOSIS — R33.9 URINARY RETENTION: ICD-10-CM

## 2025-03-09 DIAGNOSIS — K59.00 CONSTIPATION, UNSPECIFIED CONSTIPATION TYPE: ICD-10-CM

## 2025-03-09 PROCEDURE — 99285 EMERGENCY DEPT VISIT HI MDM: CPT | Mod: 25

## 2025-03-09 PROCEDURE — 99284 EMERGENCY DEPT VISIT MOD MDM: CPT | Performed by: STUDENT IN AN ORGANIZED HEALTH CARE EDUCATION/TRAINING PROGRAM

## 2025-03-09 PROCEDURE — 51798 US URINE CAPACITY MEASURE: CPT

## 2025-03-09 ASSESSMENT — COLUMBIA-SUICIDE SEVERITY RATING SCALE - C-SSRS
1. IN THE PAST MONTH, HAVE YOU WISHED YOU WERE DEAD OR WISHED YOU COULD GO TO SLEEP AND NOT WAKE UP?: NO
2. HAVE YOU ACTUALLY HAD ANY THOUGHTS OF KILLING YOURSELF IN THE PAST MONTH?: NO
6. HAVE YOU EVER DONE ANYTHING, STARTED TO DO ANYTHING, OR PREPARED TO DO ANYTHING TO END YOUR LIFE?: NO

## 2025-03-10 ENCOUNTER — APPOINTMENT (OUTPATIENT)
Dept: CT IMAGING | Facility: CLINIC | Age: 65
End: 2025-03-10
Attending: STUDENT IN AN ORGANIZED HEALTH CARE EDUCATION/TRAINING PROGRAM
Payer: COMMERCIAL

## 2025-03-10 VITALS
WEIGHT: 238 LBS | HEIGHT: 69 IN | OXYGEN SATURATION: 96 % | BODY MASS INDEX: 35.25 KG/M2 | TEMPERATURE: 97.6 F | RESPIRATION RATE: 22 BRPM | SYSTOLIC BLOOD PRESSURE: 116 MMHG | DIASTOLIC BLOOD PRESSURE: 79 MMHG | HEART RATE: 92 BPM

## 2025-03-10 LAB
ALBUMIN SERPL BCG-MCNC: 3.9 G/DL (ref 3.5–5.2)
ALBUMIN UR-MCNC: NEGATIVE MG/DL
ALP SERPL-CCNC: 127 U/L (ref 40–150)
ALT SERPL W P-5'-P-CCNC: 17 U/L (ref 0–70)
ANION GAP SERPL CALCULATED.3IONS-SCNC: 15 MMOL/L (ref 7–15)
APPEARANCE UR: CLEAR
AST SERPL W P-5'-P-CCNC: 21 U/L (ref 0–45)
BASOPHILS # BLD AUTO: 0.1 10E3/UL (ref 0–0.2)
BASOPHILS NFR BLD AUTO: 1 %
BILIRUB SERPL-MCNC: 0.2 MG/DL
BILIRUB UR QL STRIP: NEGATIVE
BUN SERPL-MCNC: 12.5 MG/DL (ref 8–23)
CALCIUM SERPL-MCNC: 9.2 MG/DL (ref 8.8–10.4)
CHLORIDE SERPL-SCNC: 101 MMOL/L (ref 98–107)
COLOR UR AUTO: ABNORMAL
CREAT SERPL-MCNC: 1.18 MG/DL (ref 0.67–1.17)
EGFRCR SERPLBLD CKD-EPI 2021: 69 ML/MIN/1.73M2
EOSINOPHIL # BLD AUTO: 0.1 10E3/UL (ref 0–0.7)
EOSINOPHIL NFR BLD AUTO: 1 %
ERYTHROCYTE [DISTWIDTH] IN BLOOD BY AUTOMATED COUNT: 13.3 % (ref 10–15)
GLUCOSE SERPL-MCNC: 123 MG/DL (ref 70–99)
GLUCOSE UR STRIP-MCNC: NEGATIVE MG/DL
HCO3 SERPL-SCNC: 19 MMOL/L (ref 22–29)
HCT VFR BLD AUTO: 36 % (ref 40–53)
HGB BLD-MCNC: 12.1 G/DL (ref 13.3–17.7)
HGB UR QL STRIP: NEGATIVE
IMM GRANULOCYTES # BLD: 0.5 10E3/UL
IMM GRANULOCYTES NFR BLD: 5 %
KETONES UR STRIP-MCNC: NEGATIVE MG/DL
LACTATE SERPL-SCNC: 1.1 MMOL/L (ref 0.7–2)
LEUKOCYTE ESTERASE UR QL STRIP: NEGATIVE
LYMPHOCYTES # BLD AUTO: 2.3 10E3/UL (ref 0.8–5.3)
LYMPHOCYTES NFR BLD AUTO: 26 %
MCH RBC QN AUTO: 31.3 PG (ref 26.5–33)
MCHC RBC AUTO-ENTMCNC: 33.6 G/DL (ref 31.5–36.5)
MCV RBC AUTO: 93 FL (ref 78–100)
MONOCYTES # BLD AUTO: 1.4 10E3/UL (ref 0–1.3)
MONOCYTES NFR BLD AUTO: 16 %
MUCOUS THREADS #/AREA URNS LPF: PRESENT /LPF
NEUTROPHILS # BLD AUTO: 4.6 10E3/UL (ref 1.6–8.3)
NEUTROPHILS NFR BLD AUTO: 51 %
NITRATE UR QL: NEGATIVE
NRBC # BLD AUTO: 0 10E3/UL
NRBC BLD AUTO-RTO: 0 /100
PH UR STRIP: 5.5 [PH] (ref 5–7)
PLATELET # BLD AUTO: 653 10E3/UL (ref 150–450)
POTASSIUM SERPL-SCNC: 4.2 MMOL/L (ref 3.4–5.3)
PROT SERPL-MCNC: 7.1 G/DL (ref 6.4–8.3)
RBC # BLD AUTO: 3.86 10E6/UL (ref 4.4–5.9)
RBC URINE: 1 /HPF
SODIUM SERPL-SCNC: 135 MMOL/L (ref 135–145)
SP GR UR STRIP: 1.02 (ref 1–1.03)
UROBILINOGEN UR STRIP-MCNC: NORMAL MG/DL
WBC # BLD AUTO: 8.9 10E3/UL (ref 4–11)
WBC URINE: 0 /HPF

## 2025-03-10 PROCEDURE — 80053 COMPREHEN METABOLIC PANEL: CPT | Performed by: STUDENT IN AN ORGANIZED HEALTH CARE EDUCATION/TRAINING PROGRAM

## 2025-03-10 PROCEDURE — 85025 COMPLETE CBC W/AUTO DIFF WBC: CPT | Performed by: STUDENT IN AN ORGANIZED HEALTH CARE EDUCATION/TRAINING PROGRAM

## 2025-03-10 PROCEDURE — 96374 THER/PROPH/DIAG INJ IV PUSH: CPT | Mod: 59

## 2025-03-10 PROCEDURE — 96375 TX/PRO/DX INJ NEW DRUG ADDON: CPT

## 2025-03-10 PROCEDURE — 250N000009 HC RX 250: Performed by: STUDENT IN AN ORGANIZED HEALTH CARE EDUCATION/TRAINING PROGRAM

## 2025-03-10 PROCEDURE — 36415 COLL VENOUS BLD VENIPUNCTURE: CPT | Performed by: STUDENT IN AN ORGANIZED HEALTH CARE EDUCATION/TRAINING PROGRAM

## 2025-03-10 PROCEDURE — 74177 CT ABD & PELVIS W/CONTRAST: CPT

## 2025-03-10 PROCEDURE — 81003 URINALYSIS AUTO W/O SCOPE: CPT | Performed by: STUDENT IN AN ORGANIZED HEALTH CARE EDUCATION/TRAINING PROGRAM

## 2025-03-10 PROCEDURE — 250N000013 HC RX MED GY IP 250 OP 250 PS 637: Performed by: STUDENT IN AN ORGANIZED HEALTH CARE EDUCATION/TRAINING PROGRAM

## 2025-03-10 PROCEDURE — 83605 ASSAY OF LACTIC ACID: CPT | Performed by: STUDENT IN AN ORGANIZED HEALTH CARE EDUCATION/TRAINING PROGRAM

## 2025-03-10 PROCEDURE — 250N000011 HC RX IP 250 OP 636: Performed by: STUDENT IN AN ORGANIZED HEALTH CARE EDUCATION/TRAINING PROGRAM

## 2025-03-10 RX ORDER — FENTANYL CITRATE 50 UG/ML
50 INJECTION, SOLUTION INTRAMUSCULAR; INTRAVENOUS ONCE
Status: COMPLETED | OUTPATIENT
Start: 2025-03-10 | End: 2025-03-10

## 2025-03-10 RX ORDER — IOPAMIDOL 755 MG/ML
200 INJECTION, SOLUTION INTRAVASCULAR ONCE
Status: COMPLETED | OUTPATIENT
Start: 2025-03-10 | End: 2025-03-10

## 2025-03-10 RX ORDER — HYDROMORPHONE HYDROCHLORIDE 1 MG/ML
0.5 INJECTION, SOLUTION INTRAMUSCULAR; INTRAVENOUS; SUBCUTANEOUS ONCE
Status: COMPLETED | OUTPATIENT
Start: 2025-03-10 | End: 2025-03-10

## 2025-03-10 RX ADMIN — DOCUSATE SODIUM 226 ML: 50 LIQUID ORAL at 02:43

## 2025-03-10 RX ADMIN — HYDROMORPHONE HYDROCHLORIDE 0.5 MG: 1 INJECTION, SOLUTION INTRAMUSCULAR; INTRAVENOUS; SUBCUTANEOUS at 02:38

## 2025-03-10 RX ADMIN — IOPAMIDOL 100 ML: 755 INJECTION, SOLUTION INTRAVENOUS at 01:07

## 2025-03-10 RX ADMIN — FENTANYL CITRATE 50 MCG: 50 INJECTION, SOLUTION INTRAMUSCULAR; INTRAVENOUS at 00:25

## 2025-03-10 RX ADMIN — SODIUM CHLORIDE 60 ML: 9 INJECTION, SOLUTION INTRAVENOUS at 01:06

## 2025-03-10 ASSESSMENT — ACTIVITIES OF DAILY LIVING (ADL)
ADLS_ACUITY_SCORE: 42

## 2025-03-10 NOTE — ED NOTES
Bladder scanned pt for 262 ml urine. Did rescan 2 additional times which showed 249ml. Pt up to bathroom with urinal to try to void at this time.    Appearance: casual grooming, no overt deficits in hygiene;  sig tremor not rigid. Some sedation  Behavior: poorly related dismissive  Speech: wnl  Mood: irritable  Affect: congruent, stable, somewhat intense  Thought process: impoverished  Thought content: mistrustfulness noted; denies SI/HI. Christianity delusions  Perceptual disturbances: no appearance of internal preoccupation  Cognition: adequately oriented  Insight: limited  Judgement: limited

## 2025-03-10 NOTE — ED TRIAGE NOTES
Pt presents with three days of constipation and urinary retention. Has not eaten or drank anything for a 1.5 days. Pt has stage 2 small cell lung cancer. Pt receives chemo treatment at [facility]. Pt abdomen distended.

## 2025-03-10 NOTE — ED NOTES
Enema administered with pt having large results. Returned to bed without incident. Call light in reach

## 2025-03-10 NOTE — DISCHARGE INSTRUCTIONS
We are glad that we were able to drain your bladder.  We are also glad that we were able to relieve your constipation with an enema.  You can use further enemas at home if needed.  There is a chance that the constipation and the amount of stool was causing the urinary retention.  We offered to leave the catheter in place, since you wanted it removed please drink a lot of water and try to urinate on your own at home, if you cannot and you feel like the urine is accumulating in your bladder becoming painful please go to your nearest ER.  Follow-up with your primary doctor as well.    Please call your primary doctor in the morning to discuss your ER visit, make sure you are doing well, and to follow up on any incidental findings. Please come back to the ER for re evaluation if you feel like things are getting worse or have other concerns.

## 2025-03-10 NOTE — ED NOTES
MD spoke with pt regarding his castro catheter; pt opted to remove the catheter prior to discharge.   Castro has been removed.     Pt is set for discharge at this time. Pt did receive IV pain medications and is unable to obtain a ride home.  Per MD, pt is ok to discharge at 0630 by private vehicle.     Pt is alert, GUAJARDO. Denies pain at this time.

## 2025-03-10 NOTE — ED PROVIDER NOTES
History     Chief Complaint   Patient presents with    Constipation    Urinary Retention     HPI  Eliseo Davis is a 64 year old male who presents to the emergency department with concerns of urinary retention.  Does not believe he had any urine output over the past 2 to 3 days.  Also mentions his last bowel movement was semisolid and brown was 2 to 3 days ago.  He usually goes every day.  He feels like he needs to urinate and he feels like he needs to have a bowel movement.  He had his appendix out.  No other abdominal surgeries.  Currently on chemotherapy for lung cancer, not metastatic.  He has followed up with urology in the past for prostate hypertrophy, on finasteride.  Incidentally had recent diagnosis of DVT in the left upper extremity, on Eliquis and taking this as prescribed.  The last time he urinated he did not note any particular blood no pain in the testicles.  There was no dysuria the last time he peed.    Allergies:  Allergies   Allergen Reactions    Penicillins Anaphylaxis    Dust Mites     Pollen Extract        Problem List:    Patient Active Problem List    Diagnosis Date Noted    Acute appendicitis with localized peritonitis, without perforation, abscess, or gangrene 12/28/2020     Priority: Medium    Obesity (BMI 35.0-39.9) with comorbidity (H) 07/31/2019     Priority: Medium    Atypical chest pain 02/16/2019     Priority: Medium    COPD (chronic obstructive pulmonary disease) (H) 02/16/2019     Priority: Medium    Hyperlipidemia LDL goal <100 02/16/2019     Priority: Medium    Gastroesophageal reflux disease 02/16/2019     Priority: Medium    Tachycardia 02/16/2019     Priority: Medium    ACS (acute coronary syndrome) (H) 06/22/2017     Priority: Medium    Urticaria 06/13/2017     Priority: Medium    Neutrophilic leukocytosis 06/12/2017     Priority: Medium    Pulmonary nodule - 5 mm RUL (high risk pt) 06/12/2017     Priority: Medium    Reactive airway disease with wheezing with acute  exacerbation 06/11/2017     Priority: Medium    Obesity 06/11/2017     Priority: Medium    CARDIOVASCULAR SCREENING; LDL GOAL LESS THAN 130 09/25/2013     Priority: Medium    Thoracic back pain 09/25/2013     Priority: Medium        Past Medical History:    Past Medical History:   Diagnosis Date    CARDIOVASCULAR SCREENING; LDL GOAL LESS THAN 130 9/25/2013    Uncomplicated asthma        Past Surgical History:    Past Surgical History:   Procedure Laterality Date    CORONARY ANGIOGRAPHY ADULT ORDER  06/22/2017    ESOPHAGOSCOPY, GASTROSCOPY, DUODENOSCOPY (EGD), COMBINED N/A 2/25/2019    Procedure: COMBINED ESOPHAGOSCOPY, GASTROSCOPY, DUODENOSCOPY (EGD), BIOPSY SINGLE OR MULTIPLE;  Surgeon: Emmanuel De La Fuente DO;  Location: [facility]    IR PICC PLACEMENT > 5 YRS OF AGE  2/14/2025    LAPAROSCOPIC APPENDECTOMY N/A 12/27/2020    Procedure: APPENDECTOMY, LAPAROSCOPIC;  Surgeon: Leeroy Banks DO;  Location: [facility] OR       Family History:    Family History   Problem Relation Age of Onset    Cancer Mother     Cancer Father        Social History:  Marital Status:  Single [1]  Social History     Tobacco Use    Smoking status: Former     Current packs/day: 1.00     Average packs/day: 1 pack/day for 35.0 years (35.0 ttl pk-yrs)     Types: Cigarettes    Smokeless tobacco: Former     Quit date: 1/5/2017   Substance Use Topics    Alcohol use: No     Comment: 2 drinks yesterday    Drug use: Yes     Types: Marijuana     Comment: 2-3 tiomes a year        Medications:    acetaminophen (TYLENOL) 325 MG tablet  aclidinium (TUDORZA PRESSAIR) 400 MCG/ACT inhaler  albuterol (PROAIR HFA/PROVENTIL HFA/VENTOLIN HFA) 108 (90 BASE) MCG/ACT Inhaler  ALBUTEROL SULFATE IN  aspirin EC 81 MG EC tablet  atorvastatin (LIPITOR) 80 MG tablet  bismuth subsalicylate (PEPTO BISMOL) 262 MG/15ML suspension  budesonide-formoterol (SYMBICORT) 80-4.5 MCG/ACT Inhaler  finasteride (PROSCAR) 5 MG tablet  fluticasone (FLONASE) 50 MCG/ACT nasal spray  ipratropium  (ATROVENT HFA) 17 MCG/ACT inhaler  lisinopril (PRINIVIL/ZESTRIL) 10 MG tablet  omeprazole (PRILOSEC) 40 MG DR capsule  ondansetron (ZOFRAN-ODT) 4 MG ODT tab  oxyCODONE (ROXICODONE) 5 MG tablet  senna-docusate (SENOKOT-S/PERICOLACE) 8.6-50 MG tablet          Review of Systems  Gen: No fevers, no unintentional weight changes  Head and neck: No headache, no neck pain  Eye: No eye pain, no vision changes  Respiratory: No shortness of breath, no cough  Cardiovascular: No chest pain, no palpitations  Abdominal: No vomiting, no diarrhea  Urinary: No dysuria, no hematuria  Musculoskeletal: No joint redness, no trauma  Neurologic: No confusion, no weakness, no sensation changes  Psychiatric: No suicidal ideation, no homicidal ideation    Physical Exam   BP: 133/75  Pulse: 117  Temp: 97.6  F (36.4  C)  Resp: 22  SpO2: 96 %      Physical Exam  General: Alert, awake, no distress  Head: Normocephalic, atraumatic  Eyes: Grossly intact extraocular movements, conjunctiva clear, pupils equal   Mouth: Mucous membranes moist  Neck: Supple, grossly full range of motion, no observable masses  Respiratory: No distress,  clear to auscultation bilaterally  Cardiac: S1 and S2 cardiac sounds appreciated, normal rate, no edema  Abdominal: Soft, not distended, tenderness in the suprapubic region  Neurologic: Normal level of consciousness, speech is clear and appropriate, moving all extremities grossly normal and symmetric  Skin: No gross rashes or lesions  Psych: Normal mood and appropriate for situation        ED Course        Procedures                  No results found for this or any previous visit (from the past 24 hours).    Medications   fentaNYL (PF) (SUBLIMAZE) injection 50 mcg (has no administration in time range)       Assessments & Plan (with Medical Decision Making)   Vital signs are notable for tachycardia 117, he is uncomfortable.  This will be trended.  Discussed various means of analgesia, states he is in a lot of pain we will  give him initial dose of fentanyl.  He is not on chronic opiates.  We did a bladder scan after he tried to urinate and noted 260.  Discussed that we can give him some fluids and monitor, he states he has the urge to urinate and its uncomfortable and would prefer bladder decompression, we will place a Mcintyre catheter.    I have reviewed the nursing notes.    I have reviewed the findings, diagnosis, plan and need for follow up with the patient.          New Prescriptions    No medications on file       Final diagnoses:   None       3/9/2025   [facility] EMERGENCY DEPT     follow up on any incidental findings. Encouraged to come back to the ER for re evaluation if worsening or any other concerns.     I have reviewed the nursing notes.    I have reviewed the findings, diagnosis, plan and need for follow up with the patient.          Discharge Medication List as of 3/10/2025  6:30 AM          Final diagnoses:   Constipation, unspecified constipation type   Urinary retention       3/9/2025   [facility] EMERGENCY DEPT       Aditya Veloz MD  03/19/25 0612

## 2025-06-09 ENCOUNTER — APPOINTMENT (OUTPATIENT)
Dept: GENERAL RADIOLOGY | Facility: CLINIC | Age: 65
End: 2025-06-09
Attending: FAMILY MEDICINE
Payer: COMMERCIAL

## 2025-06-09 ENCOUNTER — HOSPITAL ENCOUNTER (EMERGENCY)
Facility: CLINIC | Age: 65
Discharge: HOME | End: 2025-06-09
Attending: FAMILY MEDICINE | Admitting: FAMILY MEDICINE
Payer: COMMERCIAL

## 2025-06-09 ENCOUNTER — APPOINTMENT (OUTPATIENT)
Dept: CT IMAGING | Facility: CLINIC | Age: 65
End: 2025-06-09
Attending: FAMILY MEDICINE
Payer: COMMERCIAL

## 2025-06-09 VITALS
RESPIRATION RATE: 18 BRPM | DIASTOLIC BLOOD PRESSURE: 65 MMHG | HEART RATE: 109 BPM | WEIGHT: 238.1 LBS | TEMPERATURE: 98.5 F | SYSTOLIC BLOOD PRESSURE: 108 MMHG | OXYGEN SATURATION: 93 % | BODY MASS INDEX: 35.16 KG/M2

## 2025-06-09 DIAGNOSIS — D72.829 LEUKOCYTOSIS, UNSPECIFIED TYPE: ICD-10-CM

## 2025-06-09 DIAGNOSIS — R00.0 SINUS TACHYCARDIA: ICD-10-CM

## 2025-06-09 DIAGNOSIS — N18.31 STAGE 3A CHRONIC KIDNEY DISEASE (H): ICD-10-CM

## 2025-06-09 DIAGNOSIS — R06.00 DYSPNEA, UNSPECIFIED TYPE: ICD-10-CM

## 2025-06-09 DIAGNOSIS — J44.1 COPD WITH ACUTE EXACERBATION (H): ICD-10-CM

## 2025-06-09 LAB
ANION GAP SERPL CALCULATED.3IONS-SCNC: 13 MMOL/L (ref 7–15)
ATRIAL RATE - MUSE: 117 BPM
BASE EXCESS BLDV CALC-SCNC: -0.5 MMOL/L (ref -3–3)
BASOPHILS # BLD AUTO: 0.1 10E3/UL (ref 0–0.2)
BASOPHILS NFR BLD AUTO: 1 %
BUN SERPL-MCNC: 15 MG/DL (ref 8–23)
CALCIUM SERPL-MCNC: 8.8 MG/DL (ref 8.8–10.4)
CHLORIDE SERPL-SCNC: 103 MMOL/L (ref 98–107)
CREAT SERPL-MCNC: 1.41 MG/DL (ref 0.67–1.17)
D DIMER PPP FEU-MCNC: 2.05 UG/ML FEU (ref 0–0.5)
DIASTOLIC BLOOD PRESSURE - MUSE: NORMAL MMHG
EGFRCR SERPLBLD CKD-EPI 2021: 56 ML/MIN/1.73M2
EOSINOPHIL # BLD AUTO: 1.4 10E3/UL (ref 0–0.7)
EOSINOPHIL NFR BLD AUTO: 11 %
ERYTHROCYTE [DISTWIDTH] IN BLOOD BY AUTOMATED COUNT: 16.7 % (ref 10–15)
GLUCOSE SERPL-MCNC: 130 MG/DL (ref 70–99)
HCO3 BLDV-SCNC: 25 MMOL/L (ref 21–28)
HCO3 SERPL-SCNC: 22 MMOL/L (ref 22–29)
HCT VFR BLD AUTO: 27.7 % (ref 40–53)
HGB BLD-MCNC: 9.2 G/DL (ref 13.3–17.7)
IMM GRANULOCYTES # BLD: 0.1 10E3/UL
IMM GRANULOCYTES NFR BLD: 1 %
INTERPRETATION ECG - MUSE: NORMAL
LACTATE SERPL-SCNC: 1.7 MMOL/L (ref 0.7–2)
LVEF ECHO: NORMAL
LYMPHOCYTES # BLD AUTO: 2.6 10E3/UL (ref 0.8–5.3)
LYMPHOCYTES NFR BLD AUTO: 20 %
MCH RBC QN AUTO: 31.4 PG (ref 26.5–33)
MCHC RBC AUTO-ENTMCNC: 33.2 G/DL (ref 31.5–36.5)
MCV RBC AUTO: 95 FL (ref 78–100)
MONOCYTES # BLD AUTO: 1 10E3/UL (ref 0–1.3)
MONOCYTES NFR BLD AUTO: 8 %
NEUTROPHILS # BLD AUTO: 8 10E3/UL (ref 1.6–8.3)
NEUTROPHILS NFR BLD AUTO: 60 %
NRBC # BLD AUTO: 0 10E3/UL
NRBC BLD AUTO-RTO: 0 /100
NT-PROBNP SERPL-MCNC: 285 PG/ML (ref 0–177)
O2/TOTAL GAS SETTING VFR VENT: 21 %
OXYHGB MFR BLDV: 41 % (ref 70–75)
P AXIS - MUSE: 55 DEGREES
PCO2 BLDV: 44 MM HG (ref 40–50)
PH BLDV: 7.37 [PH] (ref 7.32–7.43)
PLATELET # BLD AUTO: 318 10E3/UL (ref 150–450)
PO2 BLDV: 27 MM HG (ref 25–47)
POTASSIUM SERPL-SCNC: 3.8 MMOL/L (ref 3.4–5.3)
PR INTERVAL - MUSE: 156 MS
QRS DURATION - MUSE: 84 MS
QT - MUSE: 308 MS
QTC - MUSE: 429 MS
R AXIS - MUSE: 43 DEGREES
RBC # BLD AUTO: 2.93 10E6/UL (ref 4.4–5.9)
SAO2 % BLDV: 42.1 % (ref 70–75)
SODIUM SERPL-SCNC: 138 MMOL/L (ref 135–145)
SYSTOLIC BLOOD PRESSURE - MUSE: NORMAL MMHG
T AXIS - MUSE: 34 DEGREES
TROPONIN T SERPL HS-MCNC: 15 NG/L
TROPONIN T SERPL HS-MCNC: 15 NG/L
VENTRICULAR RATE- MUSE: 117 BPM
WBC # BLD AUTO: 13.2 10E3/UL (ref 4–11)

## 2025-06-09 PROCEDURE — 93005 ELECTROCARDIOGRAM TRACING: CPT | Performed by: FAMILY MEDICINE

## 2025-06-09 PROCEDURE — 83880 ASSAY OF NATRIURETIC PEPTIDE: CPT | Performed by: FAMILY MEDICINE

## 2025-06-09 PROCEDURE — 84484 ASSAY OF TROPONIN QUANT: CPT | Performed by: FAMILY MEDICINE

## 2025-06-09 PROCEDURE — 96375 TX/PRO/DX INJ NEW DRUG ADDON: CPT | Performed by: FAMILY MEDICINE

## 2025-06-09 PROCEDURE — 250N000011 HC RX IP 250 OP 636: Performed by: FAMILY MEDICINE

## 2025-06-09 PROCEDURE — 85379 FIBRIN DEGRADATION QUANT: CPT | Performed by: FAMILY MEDICINE

## 2025-06-09 PROCEDURE — 99284 EMERGENCY DEPT VISIT MOD MDM: CPT | Performed by: FAMILY MEDICINE

## 2025-06-09 PROCEDURE — 93010 ELECTROCARDIOGRAM REPORT: CPT | Performed by: FAMILY MEDICINE

## 2025-06-09 PROCEDURE — 85025 COMPLETE CBC W/AUTO DIFF WBC: CPT | Performed by: FAMILY MEDICINE

## 2025-06-09 PROCEDURE — 255N000002 HC RX 255 OP 636: Performed by: INTERNAL MEDICINE

## 2025-06-09 PROCEDURE — 250N000009 HC RX 250: Performed by: FAMILY MEDICINE

## 2025-06-09 PROCEDURE — 94640 AIRWAY INHALATION TREATMENT: CPT

## 2025-06-09 PROCEDURE — 82805 BLOOD GASES W/O2 SATURATION: CPT | Performed by: FAMILY MEDICINE

## 2025-06-09 PROCEDURE — 99285 EMERGENCY DEPT VISIT HI MDM: CPT | Mod: 25 | Performed by: FAMILY MEDICINE

## 2025-06-09 PROCEDURE — 999N000157 HC STATISTIC RCP TIME EA 10 MIN

## 2025-06-09 PROCEDURE — 250N000013 HC RX MED GY IP 250 OP 250 PS 637: Performed by: FAMILY MEDICINE

## 2025-06-09 PROCEDURE — 71045 X-RAY EXAM CHEST 1 VIEW: CPT

## 2025-06-09 PROCEDURE — 83605 ASSAY OF LACTIC ACID: CPT | Performed by: FAMILY MEDICINE

## 2025-06-09 PROCEDURE — 36415 COLL VENOUS BLD VENIPUNCTURE: CPT | Performed by: FAMILY MEDICINE

## 2025-06-09 PROCEDURE — 258N000003 HC RX IP 258 OP 636: Performed by: FAMILY MEDICINE

## 2025-06-09 PROCEDURE — 71275 CT ANGIOGRAPHY CHEST: CPT

## 2025-06-09 PROCEDURE — 96361 HYDRATE IV INFUSION ADD-ON: CPT | Performed by: FAMILY MEDICINE

## 2025-06-09 PROCEDURE — 80048 BASIC METABOLIC PNL TOTAL CA: CPT | Performed by: FAMILY MEDICINE

## 2025-06-09 PROCEDURE — 96374 THER/PROPH/DIAG INJ IV PUSH: CPT | Mod: 59 | Performed by: FAMILY MEDICINE

## 2025-06-09 RX ORDER — DEXAMETHASONE SODIUM PHOSPHATE 10 MG/ML
10 INJECTION, SOLUTION INTRAMUSCULAR; INTRAVENOUS ONCE
Status: COMPLETED | OUTPATIENT
Start: 2025-06-09 | End: 2025-06-09

## 2025-06-09 RX ORDER — GUAIFENESIN AND DEXTROMETHORPHAN HYDROBROMIDE 600; 30 MG/1; MG/1
1 TABLET, EXTENDED RELEASE ORAL 2 TIMES DAILY PRN
Status: DISCONTINUED | OUTPATIENT
Start: 2025-06-09 | End: 2025-06-09 | Stop reason: HOSPADM

## 2025-06-09 RX ORDER — FUROSEMIDE 10 MG/ML
40 INJECTION INTRAMUSCULAR; INTRAVENOUS ONCE
Status: COMPLETED | OUTPATIENT
Start: 2025-06-09 | End: 2025-06-09

## 2025-06-09 RX ORDER — AZITHROMYCIN 250 MG/1
250 TABLET, FILM COATED ORAL DAILY
Qty: 4 TABLET | Refills: 0 | Status: SHIPPED | OUTPATIENT
Start: 2025-06-10 | End: 2025-06-14

## 2025-06-09 RX ORDER — IOPAMIDOL 755 MG/ML
500 INJECTION, SOLUTION INTRAVASCULAR ONCE
Status: COMPLETED | OUTPATIENT
Start: 2025-06-09 | End: 2025-06-09

## 2025-06-09 RX ORDER — ONDANSETRON 2 MG/ML
4 INJECTION INTRAMUSCULAR; INTRAVENOUS EVERY 30 MIN PRN
Status: DISCONTINUED | OUTPATIENT
Start: 2025-06-09 | End: 2025-06-09 | Stop reason: HOSPADM

## 2025-06-09 RX ORDER — IPRATROPIUM BROMIDE AND ALBUTEROL SULFATE 2.5; .5 MG/3ML; MG/3ML
3 SOLUTION RESPIRATORY (INHALATION) ONCE
Status: COMPLETED | OUTPATIENT
Start: 2025-06-09 | End: 2025-06-09

## 2025-06-09 RX ORDER — AZITHROMYCIN 250 MG/1
500 TABLET, FILM COATED ORAL ONCE
Status: COMPLETED | OUTPATIENT
Start: 2025-06-09 | End: 2025-06-09

## 2025-06-09 RX ORDER — ONDANSETRON 2 MG/ML
INJECTION INTRAMUSCULAR; INTRAVENOUS
Status: COMPLETED
Start: 2025-06-09 | End: 2025-06-09

## 2025-06-09 RX ORDER — PREDNISONE 20 MG/1
TABLET ORAL
Qty: 10 TABLET | Refills: 0 | Status: SHIPPED | OUTPATIENT
Start: 2025-06-10

## 2025-06-09 RX ADMIN — AZITHROMYCIN DIHYDRATE 500 MG: 250 TABLET, FILM COATED ORAL at 04:20

## 2025-06-09 RX ADMIN — SODIUM CHLORIDE 1000 ML: 0.9 INJECTION, SOLUTION INTRAVENOUS at 01:46

## 2025-06-09 RX ADMIN — FUROSEMIDE 40 MG: 10 INJECTION, SOLUTION INTRAMUSCULAR; INTRAVENOUS at 06:30

## 2025-06-09 RX ADMIN — GUAIFENESIN AND DEXTROMETHORPHAN HYDROBROMIDE 1 TABLET: 600; 30 TABLET, EXTENDED RELEASE ORAL at 06:31

## 2025-06-09 RX ADMIN — IOPAMIDOL 75 ML: 755 INJECTION, SOLUTION INTRAVENOUS at 05:02

## 2025-06-09 RX ADMIN — SODIUM CHLORIDE 70 ML: 9 INJECTION, SOLUTION INTRAVENOUS at 05:01

## 2025-06-09 RX ADMIN — DEXAMETHASONE SODIUM PHOSPHATE 10 MG: 10 INJECTION, SOLUTION INTRAMUSCULAR; INTRAVENOUS at 01:47

## 2025-06-09 RX ADMIN — ONDANSETRON 4 MG: 2 INJECTION INTRAMUSCULAR; INTRAVENOUS at 06:37

## 2025-06-09 RX ADMIN — HUMAN ALBUMIN MICROSPHERES AND PERFLUTREN 3 ML: 10; .22 INJECTION, SOLUTION INTRAVENOUS at 09:00

## 2025-06-09 RX ADMIN — ONDANSETRON 4 MG: 2 INJECTION, SOLUTION INTRAMUSCULAR; INTRAVENOUS at 06:37

## 2025-06-09 RX ADMIN — IPRATROPIUM BROMIDE AND ALBUTEROL SULFATE 3 ML: .5; 3 SOLUTION RESPIRATORY (INHALATION) at 03:41

## 2025-06-09 ASSESSMENT — COLUMBIA-SUICIDE SEVERITY RATING SCALE - C-SSRS
1. IN THE PAST MONTH, HAVE YOU WISHED YOU WERE DEAD OR WISHED YOU COULD GO TO SLEEP AND NOT WAKE UP?: NO
6. HAVE YOU EVER DONE ANYTHING, STARTED TO DO ANYTHING, OR PREPARED TO DO ANYTHING TO END YOUR LIFE?: NO
2. HAVE YOU ACTUALLY HAD ANY THOUGHTS OF KILLING YOURSELF IN THE PAST MONTH?: NO

## 2025-06-09 ASSESSMENT — ACTIVITIES OF DAILY LIVING (ADL)
ADLS_ACUITY_SCORE: 42

## 2025-06-09 ASSESSMENT — ENCOUNTER SYMPTOMS: FEVER: 0

## 2025-06-09 NOTE — ED NOTES
PT up to the bathroom, steady on feet. After that activity pt with noticeable increased work of breathing. PT back in bed in position of comfort, VSS. MD updated.

## 2025-06-09 NOTE — DISCHARGE INSTRUCTIONS
It looks like you have a COPD exacerbation.  You were given steroids and an antibiotic today.  The remaining prescription was sent to your pharmacy, and can be started tomorrow.  Complete the course even if you begin to feel better    You may use your nebulizer solution every 4-6 hours as needed for cough, shortness of breath, or wheezing    Follow-up closely with your primary doctor within 1 to 2 weeks in clinic    If you develop any significant new or worsening symptoms do not hesitate to return to the emergency room for evaluation
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
## 2025-09-02 ENCOUNTER — HOSPITAL ENCOUNTER (OUTPATIENT)
Facility: CLINIC | Age: 65
Setting detail: OBSERVATION
End: 2025-09-02
Attending: STUDENT IN AN ORGANIZED HEALTH CARE EDUCATION/TRAINING PROGRAM | Admitting: INTERNAL MEDICINE
Payer: COMMERCIAL

## 2025-09-02 DIAGNOSIS — J45.901 REACTIVE AIRWAY DISEASE WITH WHEEZING WITH ACUTE EXACERBATION: ICD-10-CM

## 2025-09-02 DIAGNOSIS — J44.1 COPD EXACERBATION (H): Primary | ICD-10-CM

## 2025-09-02 DIAGNOSIS — J96.01 ACUTE RESPIRATORY FAILURE WITH HYPOXIA (H): ICD-10-CM

## 2025-09-02 PROBLEM — N17.9 AKI (ACUTE KIDNEY INJURY): Status: ACTIVE | Noted: 2025-09-02

## 2025-09-02 LAB
ALBUMIN SERPL BCG-MCNC: 4.2 G/DL (ref 3.5–5.2)
ALP SERPL-CCNC: 96 U/L (ref 40–150)
ALT SERPL W P-5'-P-CCNC: 16 U/L (ref 0–70)
ANION GAP SERPL CALCULATED.3IONS-SCNC: 16 MMOL/L (ref 7–15)
AST SERPL W P-5'-P-CCNC: 19 U/L (ref 0–45)
ATRIAL RATE - MUSE: 121 BPM
BASE EXCESS BLDV CALC-SCNC: -1 MMOL/L (ref -3–3)
BASOPHILS # BLD AUTO: 0.12 10E3/UL (ref 0–0.2)
BASOPHILS NFR BLD AUTO: 1.2 %
BILIRUB SERPL-MCNC: 0.4 MG/DL
BUN SERPL-MCNC: 14.2 MG/DL (ref 8–23)
CALCIUM SERPL-MCNC: 9.4 MG/DL (ref 8.8–10.4)
CHLORIDE SERPL-SCNC: 102 MMOL/L (ref 98–107)
CREAT SERPL-MCNC: 1.72 MG/DL (ref 0.67–1.17)
DIASTOLIC BLOOD PRESSURE - MUSE: NORMAL MMHG
EGFRCR SERPLBLD CKD-EPI 2021: 44 ML/MIN/1.73M2
EOSINOPHIL # BLD AUTO: 0.91 10E3/UL (ref 0–0.7)
EOSINOPHIL NFR BLD AUTO: 9.1 %
ERYTHROCYTE [DISTWIDTH] IN BLOOD BY AUTOMATED COUNT: 12.7 % (ref 10–15)
FLUAV RNA SPEC QL NAA+PROBE: NEGATIVE
FLUBV RNA RESP QL NAA+PROBE: NEGATIVE
GLUCOSE SERPL-MCNC: 104 MG/DL (ref 70–99)
HCO3 BLDV-SCNC: 24 MMOL/L (ref 21–28)
HCO3 SERPL-SCNC: 20 MMOL/L (ref 22–29)
HCT VFR BLD AUTO: 36 % (ref 40–53)
HGB BLD-MCNC: 12.2 G/DL (ref 13.3–17.7)
HOLD SPECIMEN: NORMAL
HOLD SPECIMEN: NORMAL
IMM GRANULOCYTES # BLD: 0.03 10E3/UL
IMM GRANULOCYTES NFR BLD: 0.3 %
INTERPRETATION ECG - MUSE: NORMAL
LACTATE SERPL-SCNC: 1.7 MMOL/L (ref 0.7–2)
LACTATE SERPL-SCNC: 2.4 MMOL/L (ref 0.7–2)
LYMPHOCYTES # BLD AUTO: 3.76 10E3/UL (ref 0.8–5.3)
LYMPHOCYTES NFR BLD AUTO: 37.4 %
MCH RBC QN AUTO: 32.9 PG (ref 26.5–33)
MCHC RBC AUTO-ENTMCNC: 33.9 G/DL (ref 31.5–36.5)
MCV RBC AUTO: 97 FL (ref 78–100)
MONOCYTES # BLD AUTO: 0.79 10E3/UL (ref 0–1.3)
MONOCYTES NFR BLD AUTO: 7.9 %
NEUTROPHILS # BLD AUTO: 4.44 10E3/UL (ref 1.6–8.3)
NEUTROPHILS NFR BLD AUTO: 44.1 %
NRBC # BLD AUTO: <0.03 10E3/UL
NRBC BLD AUTO-RTO: 0 /100
NT-PROBNP SERPL-MCNC: 129 PG/ML (ref 0–177)
O2/TOTAL GAS SETTING VFR VENT: 21 %
OXYHGB MFR BLDV: 84 % (ref 70–75)
P AXIS - MUSE: 67 DEGREES
PCO2 BLDV: 39 MM HG (ref 40–50)
PH BLDV: 7.39 [PH] (ref 7.32–7.43)
PLATELET # BLD AUTO: 322 10E3/UL (ref 150–450)
PO2 BLDV: 51 MM HG (ref 25–47)
POTASSIUM SERPL-SCNC: 4 MMOL/L (ref 3.4–5.3)
PR INTERVAL - MUSE: 146 MS
PROT SERPL-MCNC: 7 G/DL (ref 6.4–8.3)
QRS DURATION - MUSE: 84 MS
QT - MUSE: 314 MS
QTC - MUSE: 445 MS
R AXIS - MUSE: 66 DEGREES
RBC # BLD AUTO: 3.71 10E6/UL (ref 4.4–5.9)
RSV RNA SPEC NAA+PROBE: NEGATIVE
SAO2 % BLDV: 85.1 % (ref 70–75)
SARS-COV-2 RNA RESP QL NAA+PROBE: NEGATIVE
SODIUM SERPL-SCNC: 138 MMOL/L (ref 135–145)
SYSTOLIC BLOOD PRESSURE - MUSE: NORMAL MMHG
T AXIS - MUSE: 55 DEGREES
TROPONIN T SERPL HS-MCNC: 21 NG/L
TROPONIN T SERPL HS-MCNC: 22 NG/L
VENTRICULAR RATE- MUSE: 121 BPM
WBC # BLD AUTO: 10.05 10E3/UL (ref 4–11)

## 2025-09-02 PROCEDURE — 258N000003 HC RX IP 258 OP 636: Performed by: INTERNAL MEDICINE

## 2025-09-02 PROCEDURE — 87637 SARSCOV2&INF A&B&RSV AMP PRB: CPT | Performed by: EMERGENCY MEDICINE

## 2025-09-02 PROCEDURE — 999N000157 HC STATISTIC RCP TIME EA 10 MIN

## 2025-09-02 PROCEDURE — 83880 ASSAY OF NATRIURETIC PEPTIDE: CPT | Performed by: STUDENT IN AN ORGANIZED HEALTH CARE EDUCATION/TRAINING PROGRAM

## 2025-09-02 PROCEDURE — 120N000001 HC R&B MED SURG/OB

## 2025-09-02 PROCEDURE — 94640 AIRWAY INHALATION TREATMENT: CPT | Mod: 76

## 2025-09-02 PROCEDURE — 99222 1ST HOSP IP/OBS MODERATE 55: CPT | Performed by: INTERNAL MEDICINE

## 2025-09-02 PROCEDURE — 99285 EMERGENCY DEPT VISIT HI MDM: CPT | Mod: 25 | Performed by: STUDENT IN AN ORGANIZED HEALTH CARE EDUCATION/TRAINING PROGRAM

## 2025-09-02 PROCEDURE — 250N000009 HC RX 250: Performed by: EMERGENCY MEDICINE

## 2025-09-02 PROCEDURE — 84484 ASSAY OF TROPONIN QUANT: CPT | Performed by: STUDENT IN AN ORGANIZED HEALTH CARE EDUCATION/TRAINING PROGRAM

## 2025-09-02 PROCEDURE — G0378 HOSPITAL OBSERVATION PER HR: HCPCS

## 2025-09-02 PROCEDURE — 250N000013 HC RX MED GY IP 250 OP 250 PS 637: Performed by: EMERGENCY MEDICINE

## 2025-09-02 PROCEDURE — 250N000013 HC RX MED GY IP 250 OP 250 PS 637: Performed by: INTERNAL MEDICINE

## 2025-09-02 PROCEDURE — 250N000011 HC RX IP 250 OP 636: Mod: JZ | Performed by: INTERNAL MEDICINE

## 2025-09-02 PROCEDURE — 36415 COLL VENOUS BLD VENIPUNCTURE: CPT | Performed by: STUDENT IN AN ORGANIZED HEALTH CARE EDUCATION/TRAINING PROGRAM

## 2025-09-02 PROCEDURE — 82310 ASSAY OF CALCIUM: CPT | Performed by: STUDENT IN AN ORGANIZED HEALTH CARE EDUCATION/TRAINING PROGRAM

## 2025-09-02 PROCEDURE — 96374 THER/PROPH/DIAG INJ IV PUSH: CPT | Performed by: STUDENT IN AN ORGANIZED HEALTH CARE EDUCATION/TRAINING PROGRAM

## 2025-09-02 PROCEDURE — 83605 ASSAY OF LACTIC ACID: CPT | Performed by: STUDENT IN AN ORGANIZED HEALTH CARE EDUCATION/TRAINING PROGRAM

## 2025-09-02 PROCEDURE — 250N000011 HC RX IP 250 OP 636: Mod: JZ | Performed by: STUDENT IN AN ORGANIZED HEALTH CARE EDUCATION/TRAINING PROGRAM

## 2025-09-02 PROCEDURE — 999N000156 HC STATISTIC RCP CONSULT EA 30 MIN

## 2025-09-02 PROCEDURE — 94640 AIRWAY INHALATION TREATMENT: CPT

## 2025-09-02 PROCEDURE — 96374 THER/PROPH/DIAG INJ IV PUSH: CPT

## 2025-09-02 PROCEDURE — 85014 HEMATOCRIT: CPT | Performed by: STUDENT IN AN ORGANIZED HEALTH CARE EDUCATION/TRAINING PROGRAM

## 2025-09-02 PROCEDURE — 93005 ELECTROCARDIOGRAM TRACING: CPT | Performed by: STUDENT IN AN ORGANIZED HEALTH CARE EDUCATION/TRAINING PROGRAM

## 2025-09-02 PROCEDURE — 82805 BLOOD GASES W/O2 SATURATION: CPT | Performed by: STUDENT IN AN ORGANIZED HEALTH CARE EDUCATION/TRAINING PROGRAM

## 2025-09-02 PROCEDURE — 250N000009 HC RX 250: Performed by: INTERNAL MEDICINE

## 2025-09-02 RX ORDER — FLUTICASONE PROPIONATE 50 MCG
1 SPRAY, SUSPENSION (ML) NASAL DAILY
Status: DISCONTINUED | OUTPATIENT
Start: 2025-09-02 | End: 2025-09-03

## 2025-09-02 RX ORDER — SODIUM CHLORIDE 9 MG/ML
INJECTION, SOLUTION INTRAVENOUS CONTINUOUS
Status: DISCONTINUED | OUTPATIENT
Start: 2025-09-02 | End: 2025-09-03

## 2025-09-02 RX ORDER — PROCHLORPERAZINE MALEATE 5 MG/1
10 TABLET ORAL EVERY 6 HOURS PRN
Status: ACTIVE | OUTPATIENT
Start: 2025-09-02

## 2025-09-02 RX ORDER — FLUTICASONE PROPIONATE AND SALMETEROL 250; 50 UG/1; UG/1
1 POWDER RESPIRATORY (INHALATION) 2 TIMES DAILY
Status: ON HOLD | COMMUNITY
Start: 2025-02-21

## 2025-09-02 RX ORDER — IPRATROPIUM BROMIDE AND ALBUTEROL SULFATE 2.5; .5 MG/3ML; MG/3ML
3 SOLUTION RESPIRATORY (INHALATION)
Status: DISPENSED | OUTPATIENT
Start: 2025-09-02

## 2025-09-02 RX ORDER — ASPIRIN 81 MG/1
81 TABLET ORAL DAILY
Status: DISPENSED | OUTPATIENT
Start: 2025-09-02

## 2025-09-02 RX ORDER — CALCIUM CARBONATE 500 MG/1
1000 TABLET, CHEWABLE ORAL 4 TIMES DAILY PRN
Status: ACTIVE | OUTPATIENT
Start: 2025-09-02

## 2025-09-02 RX ORDER — METHYLPREDNISOLONE SODIUM SUCCINATE 40 MG/ML
40 INJECTION INTRAMUSCULAR; INTRAVENOUS
Status: DISCONTINUED | OUTPATIENT
Start: 2025-09-02 | End: 2025-09-04

## 2025-09-02 RX ORDER — IPRATROPIUM BROMIDE AND ALBUTEROL SULFATE 2.5; .5 MG/3ML; MG/3ML
3 SOLUTION RESPIRATORY (INHALATION)
Status: DISCONTINUED | OUTPATIENT
Start: 2025-09-02 | End: 2025-09-02

## 2025-09-02 RX ORDER — ATORVASTATIN CALCIUM 40 MG/1
40 TABLET, FILM COATED ORAL DAILY
Status: DISPENSED | OUTPATIENT
Start: 2025-09-02

## 2025-09-02 RX ORDER — IPRATROPIUM BROMIDE AND ALBUTEROL SULFATE 2.5; .5 MG/3ML; MG/3ML
SOLUTION RESPIRATORY (INHALATION)
Status: COMPLETED
Start: 2025-09-02 | End: 2025-09-02

## 2025-09-02 RX ORDER — DOXYCYCLINE HYCLATE 100 MG
100 TABLET ORAL 2 TIMES DAILY
Status: ON HOLD | COMMUNITY
Start: 2025-08-31

## 2025-09-02 RX ORDER — METOPROLOL TARTRATE 25 MG/1
25 TABLET, FILM COATED ORAL DAILY
Status: ON HOLD | COMMUNITY
Start: 2025-01-21 | End: 2025-09-02

## 2025-09-02 RX ORDER — FAMOTIDINE 20 MG/1
20 TABLET, FILM COATED ORAL DAILY PRN
Status: ON HOLD | COMMUNITY
Start: 2025-02-21

## 2025-09-02 RX ORDER — AMOXICILLIN 250 MG
2 CAPSULE ORAL 2 TIMES DAILY PRN
Status: ACTIVE | OUTPATIENT
Start: 2025-09-02

## 2025-09-02 RX ORDER — ONDANSETRON 4 MG/1
4 TABLET, ORALLY DISINTEGRATING ORAL EVERY 6 HOURS PRN
Status: ACTIVE | OUTPATIENT
Start: 2025-09-02

## 2025-09-02 RX ORDER — METHYLPREDNISOLONE SODIUM SUCCINATE 125 MG/2ML
125 INJECTION INTRAMUSCULAR; INTRAVENOUS ONCE
Status: COMPLETED | OUTPATIENT
Start: 2025-09-02 | End: 2025-09-02

## 2025-09-02 RX ORDER — FINASTERIDE 5 MG/1
5 TABLET, FILM COATED ORAL DAILY
Status: DISPENSED | OUTPATIENT
Start: 2025-09-02

## 2025-09-02 RX ORDER — ACETAMINOPHEN 325 MG/1
650 TABLET ORAL EVERY 4 HOURS PRN
Status: ACTIVE | OUTPATIENT
Start: 2025-09-02

## 2025-09-02 RX ORDER — TAMSULOSIN HYDROCHLORIDE 0.4 MG/1
0.4 CAPSULE ORAL EVERY EVENING
Status: ON HOLD | COMMUNITY
Start: 2025-02-21

## 2025-09-02 RX ORDER — LISINOPRIL 2.5 MG/1
5 TABLET ORAL DAILY
Status: DISPENSED | OUTPATIENT
Start: 2025-09-02

## 2025-09-02 RX ORDER — ACETAMINOPHEN 650 MG/1
650 SUPPOSITORY RECTAL EVERY 4 HOURS PRN
Status: ACTIVE | OUTPATIENT
Start: 2025-09-02

## 2025-09-02 RX ORDER — OMEPRAZOLE 20 MG/1
20 CAPSULE, DELAYED RELEASE ORAL EVERY EVENING
Status: ON HOLD | COMMUNITY
Start: 2025-02-21

## 2025-09-02 RX ORDER — PREGABALIN 25 MG/1
25 CAPSULE ORAL 3 TIMES DAILY
Status: ON HOLD | COMMUNITY
Start: 2025-08-24

## 2025-09-02 RX ORDER — ONDANSETRON 2 MG/ML
4 INJECTION INTRAMUSCULAR; INTRAVENOUS EVERY 6 HOURS PRN
Status: ACTIVE | OUTPATIENT
Start: 2025-09-02

## 2025-09-02 RX ORDER — AMOXICILLIN 250 MG
1 CAPSULE ORAL 2 TIMES DAILY PRN
Status: ACTIVE | OUTPATIENT
Start: 2025-09-02

## 2025-09-02 RX ORDER — LIDOCAINE 40 MG/G
CREAM TOPICAL
Status: ACTIVE | OUTPATIENT
Start: 2025-09-02

## 2025-09-02 RX ORDER — HYDRALAZINE HYDROCHLORIDE 25 MG/1
25 TABLET, FILM COATED ORAL EVERY 6 HOURS PRN
Status: ACTIVE | OUTPATIENT
Start: 2025-09-02

## 2025-09-02 RX ORDER — IPRATROPIUM BROMIDE AND ALBUTEROL SULFATE 2.5; .5 MG/3ML; MG/3ML
3 SOLUTION RESPIRATORY (INHALATION) EVERY 4 HOURS PRN
Status: DISPENSED | OUTPATIENT
Start: 2025-09-02

## 2025-09-02 RX ORDER — PANTOPRAZOLE SODIUM 40 MG/1
40 TABLET, DELAYED RELEASE ORAL
Status: DISPENSED | OUTPATIENT
Start: 2025-09-02

## 2025-09-02 RX ADMIN — ASPIRIN 81 MG: 81 TABLET, COATED ORAL at 12:25

## 2025-09-02 RX ADMIN — METHYLPREDNISOLONE SODIUM SUCCINATE 125 MG: 125 INJECTION, POWDER, FOR SOLUTION INTRAMUSCULAR; INTRAVENOUS at 06:36

## 2025-09-02 RX ADMIN — FLUTICASONE PROPIONATE 1 SPRAY: 50 SPRAY, METERED NASAL at 08:41

## 2025-09-02 RX ADMIN — IPRATROPIUM BROMIDE AND ALBUTEROL SULFATE 3 ML: .5; 3 SOLUTION RESPIRATORY (INHALATION) at 07:38

## 2025-09-02 RX ADMIN — IPRATROPIUM BROMIDE AND ALBUTEROL SULFATE 3 ML: .5; 3 SOLUTION RESPIRATORY (INHALATION) at 13:12

## 2025-09-02 RX ADMIN — LISINOPRIL 5 MG: 2.5 TABLET ORAL at 12:25

## 2025-09-02 RX ADMIN — FINASTERIDE 5 MG: 5 TABLET, FILM COATED ORAL at 12:25

## 2025-09-02 RX ADMIN — IPRATROPIUM BROMIDE AND ALBUTEROL SULFATE 3 ML: .5; 3 SOLUTION RESPIRATORY (INHALATION) at 09:03

## 2025-09-02 RX ADMIN — Medication 1 LOZENGE: at 12:25

## 2025-09-02 RX ADMIN — IPRATROPIUM BROMIDE AND ALBUTEROL SULFATE 3 ML: .5; 3 SOLUTION RESPIRATORY (INHALATION) at 09:47

## 2025-09-02 RX ADMIN — MELATONIN 3 MG: 3 TAB ORAL at 21:32

## 2025-09-02 RX ADMIN — METHYLPREDNISOLONE SODIUM SUCCINATE 40 MG: 40 INJECTION, POWDER, FOR SOLUTION INTRAMUSCULAR; INTRAVENOUS at 14:01

## 2025-09-02 RX ADMIN — SODIUM CHLORIDE: 0.9 INJECTION, SOLUTION INTRAVENOUS at 14:12

## 2025-09-02 RX ADMIN — IPRATROPIUM BROMIDE AND ALBUTEROL SULFATE 3 ML: .5; 3 SOLUTION RESPIRATORY (INHALATION) at 20:35

## 2025-09-02 RX ADMIN — Medication 1 LOZENGE: at 17:21

## 2025-09-02 RX ADMIN — PANTOPRAZOLE SODIUM 40 MG: 40 TABLET, DELAYED RELEASE ORAL at 16:29

## 2025-09-02 RX ADMIN — ATORVASTATIN CALCIUM 40 MG: 40 TABLET, FILM COATED ORAL at 12:25

## 2025-09-02 RX ADMIN — IPRATROPIUM BROMIDE AND ALBUTEROL SULFATE 3 ML: .5; 3 SOLUTION RESPIRATORY (INHALATION) at 17:20

## 2025-09-02 ASSESSMENT — ACTIVITIES OF DAILY LIVING (ADL)
ADLS_ACUITY_SCORE: 22
ADLS_ACUITY_SCORE: 42
ADLS_ACUITY_SCORE: 42
ADLS_ACUITY_SCORE: 22
ADLS_ACUITY_SCORE: 42
ADLS_ACUITY_SCORE: 42
ADLS_ACUITY_SCORE: 22
ADLS_ACUITY_SCORE: 42
ADLS_ACUITY_SCORE: 22
ADLS_ACUITY_SCORE: 22
ADLS_ACUITY_SCORE: 42

## 2025-09-02 ASSESSMENT — ENCOUNTER SYMPTOMS
FEVER: 0
NECK STIFFNESS: 0
ABDOMINAL PAIN: 0
VOMITING: 0
FATIGUE: 0
ACTIVITY CHANGE: 0
EYE REDNESS: 0
APPETITE CHANGE: 0
HEMATURIA: 0
HEADACHES: 0
CHILLS: 0
DIARRHEA: 0
NAUSEA: 0
COUGH: 0
MYALGIAS: 0
RHINORRHEA: 0
ARTHRALGIAS: 0
DYSURIA: 0
SORE THROAT: 0
SHORTNESS OF BREATH: 1
DIZZINESS: 0

## 2025-09-02 ASSESSMENT — COLUMBIA-SUICIDE SEVERITY RATING SCALE - C-SSRS
2. HAVE YOU ACTUALLY HAD ANY THOUGHTS OF KILLING YOURSELF IN THE PAST MONTH?: NO
6. HAVE YOU EVER DONE ANYTHING, STARTED TO DO ANYTHING, OR PREPARED TO DO ANYTHING TO END YOUR LIFE?: NO
1. IN THE PAST MONTH, HAVE YOU WISHED YOU WERE DEAD OR WISHED YOU COULD GO TO SLEEP AND NOT WAKE UP?: NO

## 2025-09-03 PROBLEM — T45.1X5A CHEMOTHERAPY-INDUCED NEUROPATHY: Status: ACTIVE | Noted: 2025-09-03

## 2025-09-03 PROBLEM — G62.0 CHEMOTHERAPY-INDUCED NEUROPATHY: Status: ACTIVE | Noted: 2025-09-03

## 2025-09-03 LAB
ANION GAP SERPL CALCULATED.3IONS-SCNC: 13 MMOL/L (ref 7–15)
BUN SERPL-MCNC: 19.7 MG/DL (ref 8–23)
CALCIUM SERPL-MCNC: 9.4 MG/DL (ref 8.8–10.4)
CHLORIDE SERPL-SCNC: 104 MMOL/L (ref 98–107)
CREAT SERPL-MCNC: 1.5 MG/DL (ref 0.67–1.17)
EGFRCR SERPLBLD CKD-EPI 2021: 52 ML/MIN/1.73M2
GLUCOSE SERPL-MCNC: 141 MG/DL (ref 70–99)
HCO3 SERPL-SCNC: 21 MMOL/L (ref 22–29)
POTASSIUM SERPL-SCNC: 4.2 MMOL/L (ref 3.4–5.3)
SODIUM SERPL-SCNC: 138 MMOL/L (ref 135–145)

## 2025-09-03 PROCEDURE — 99232 SBSQ HOSP IP/OBS MODERATE 35: CPT | Performed by: INTERNAL MEDICINE

## 2025-09-03 PROCEDURE — 36415 COLL VENOUS BLD VENIPUNCTURE: CPT | Performed by: INTERNAL MEDICINE

## 2025-09-03 PROCEDURE — 250N000009 HC RX 250: Performed by: INTERNAL MEDICINE

## 2025-09-03 PROCEDURE — 80048 BASIC METABOLIC PNL TOTAL CA: CPT | Performed by: INTERNAL MEDICINE

## 2025-09-03 PROCEDURE — 250N000013 HC RX MED GY IP 250 OP 250 PS 637: Performed by: INTERNAL MEDICINE

## 2025-09-03 PROCEDURE — G0378 HOSPITAL OBSERVATION PER HR: HCPCS

## 2025-09-03 PROCEDURE — 999N000156 HC STATISTIC RCP CONSULT EA 30 MIN

## 2025-09-03 PROCEDURE — 999N000157 HC STATISTIC RCP TIME EA 10 MIN

## 2025-09-03 PROCEDURE — 94640 AIRWAY INHALATION TREATMENT: CPT | Mod: 76

## 2025-09-03 PROCEDURE — 258N000003 HC RX IP 258 OP 636: Performed by: INTERNAL MEDICINE

## 2025-09-03 PROCEDURE — 250N000011 HC RX IP 250 OP 636: Mod: JZ | Performed by: INTERNAL MEDICINE

## 2025-09-03 PROCEDURE — 94640 AIRWAY INHALATION TREATMENT: CPT

## 2025-09-03 RX ORDER — TAMSULOSIN HYDROCHLORIDE 0.4 MG/1
0.4 CAPSULE ORAL EVERY EVENING
Status: DISPENSED | OUTPATIENT
Start: 2025-09-03

## 2025-09-03 RX ORDER — PREGABALIN 25 MG/1
25 CAPSULE ORAL 3 TIMES DAILY
Status: DISPENSED | OUTPATIENT
Start: 2025-09-03

## 2025-09-03 RX ORDER — MONTELUKAST SODIUM 10 MG/1
10 TABLET ORAL AT BEDTIME
Status: DISPENSED | OUTPATIENT
Start: 2025-09-03

## 2025-09-03 RX ORDER — FAMOTIDINE 20 MG/1
20 TABLET, FILM COATED ORAL 2 TIMES DAILY PRN
Status: ACTIVE | OUTPATIENT
Start: 2025-09-03

## 2025-09-03 RX ORDER — DOXYCYCLINE 100 MG/1
100 CAPSULE ORAL 2 TIMES DAILY
Status: DISPENSED | OUTPATIENT
Start: 2025-09-03

## 2025-09-03 RX ORDER — MAGNESIUM HYDROXIDE/ALUMINUM HYDROXICE/SIMETHICONE 120; 1200; 1200 MG/30ML; MG/30ML; MG/30ML
30 SUSPENSION ORAL EVERY 4 HOURS PRN
Status: DISPENSED | OUTPATIENT
Start: 2025-09-03

## 2025-09-03 RX ORDER — OMEPRAZOLE 20 MG/1
20 CAPSULE, DELAYED RELEASE ORAL EVERY EVENING
Status: DISCONTINUED | OUTPATIENT
Start: 2025-09-03 | End: 2025-09-03

## 2025-09-03 RX ORDER — DIPHENHYDRAMINE HCL 25 MG
25 CAPSULE ORAL EVERY 6 HOURS PRN
Status: DISPENSED | OUTPATIENT
Start: 2025-09-03

## 2025-09-03 RX ADMIN — SALINE NASAL SPRAY 1 SPRAY: 1.5 SOLUTION NASAL at 10:34

## 2025-09-03 RX ADMIN — PANTOPRAZOLE SODIUM 40 MG: 40 TABLET, DELAYED RELEASE ORAL at 06:38

## 2025-09-03 RX ADMIN — PANTOPRAZOLE SODIUM 40 MG: 40 TABLET, DELAYED RELEASE ORAL at 16:03

## 2025-09-03 RX ADMIN — IPRATROPIUM BROMIDE AND ALBUTEROL SULFATE 3 ML: .5; 3 SOLUTION RESPIRATORY (INHALATION) at 07:42

## 2025-09-03 RX ADMIN — ALUMINUM HYDROXIDE, MAGNESIUM HYDROXIDE, AND SIMETHICONE 30 ML: 200; 200; 20 SUSPENSION ORAL at 12:38

## 2025-09-03 RX ADMIN — IPRATROPIUM BROMIDE AND ALBUTEROL SULFATE 3 ML: .5; 3 SOLUTION RESPIRATORY (INHALATION) at 22:10

## 2025-09-03 RX ADMIN — DOXYCYCLINE 100 MG: 100 CAPSULE ORAL at 20:07

## 2025-09-03 RX ADMIN — MONTELUKAST 10 MG: 10 TABLET, FILM COATED ORAL at 20:07

## 2025-09-03 RX ADMIN — DIPHENHYDRAMINE HYDROCHLORIDE 25 MG: 25 CAPSULE ORAL at 18:38

## 2025-09-03 RX ADMIN — IPRATROPIUM BROMIDE AND ALBUTEROL SULFATE 3 ML: .5; 3 SOLUTION RESPIRATORY (INHALATION) at 11:53

## 2025-09-03 RX ADMIN — SODIUM CHLORIDE: 0.9 INJECTION, SOLUTION INTRAVENOUS at 12:28

## 2025-09-03 RX ADMIN — IPRATROPIUM BROMIDE AND ALBUTEROL SULFATE 3 ML: .5; 3 SOLUTION RESPIRATORY (INHALATION) at 19:41

## 2025-09-03 RX ADMIN — ATORVASTATIN CALCIUM 40 MG: 40 TABLET, FILM COATED ORAL at 08:05

## 2025-09-03 RX ADMIN — TAMSULOSIN HYDROCHLORIDE 0.4 MG: 0.4 CAPSULE ORAL at 20:07

## 2025-09-03 RX ADMIN — METHYLPREDNISOLONE SODIUM SUCCINATE 40 MG: 40 INJECTION, POWDER, FOR SOLUTION INTRAMUSCULAR; INTRAVENOUS at 08:05

## 2025-09-03 RX ADMIN — IPRATROPIUM BROMIDE AND ALBUTEROL SULFATE 3 ML: .5; 3 SOLUTION RESPIRATORY (INHALATION) at 00:26

## 2025-09-03 RX ADMIN — IPRATROPIUM BROMIDE AND ALBUTEROL SULFATE 3 ML: .5; 3 SOLUTION RESPIRATORY (INHALATION) at 15:56

## 2025-09-03 RX ADMIN — PREGABALIN 25 MG: 25 CAPSULE ORAL at 13:53

## 2025-09-03 RX ADMIN — SODIUM CHLORIDE: 0.9 INJECTION, SOLUTION INTRAVENOUS at 00:40

## 2025-09-03 RX ADMIN — PREGABALIN 25 MG: 25 CAPSULE ORAL at 20:07

## 2025-09-03 RX ADMIN — METHYLPREDNISOLONE SODIUM SUCCINATE 40 MG: 40 INJECTION, POWDER, FOR SOLUTION INTRAMUSCULAR; INTRAVENOUS at 13:53

## 2025-09-03 RX ADMIN — DOXYCYCLINE 100 MG: 100 CAPSULE ORAL at 12:52

## 2025-09-03 RX ADMIN — FINASTERIDE 5 MG: 5 TABLET, FILM COATED ORAL at 08:06

## 2025-09-03 RX ADMIN — LISINOPRIL 5 MG: 2.5 TABLET ORAL at 08:06

## 2025-09-03 RX ADMIN — IPRATROPIUM BROMIDE AND ALBUTEROL SULFATE 3 ML: .5; 3 SOLUTION RESPIRATORY (INHALATION) at 04:28

## 2025-09-03 RX ADMIN — MELATONIN 3 MG: 3 TAB ORAL at 21:56

## 2025-09-03 RX ADMIN — ASPIRIN 81 MG: 81 TABLET, COATED ORAL at 08:06

## 2025-09-03 ASSESSMENT — ACTIVITIES OF DAILY LIVING (ADL)
ADLS_ACUITY_SCORE: 26
ADLS_ACUITY_SCORE: 21
ADLS_ACUITY_SCORE: 21
ADLS_ACUITY_SCORE: 26
ADLS_ACUITY_SCORE: 21
ADLS_ACUITY_SCORE: 21
ADLS_ACUITY_SCORE: 26
ADLS_ACUITY_SCORE: 21
ADLS_ACUITY_SCORE: 26
ADLS_ACUITY_SCORE: 21
ADLS_ACUITY_SCORE: 26
ADLS_ACUITY_SCORE: 21
ADLS_ACUITY_SCORE: 26
ADLS_ACUITY_SCORE: 21
ADLS_ACUITY_SCORE: 26

## 2025-09-04 VITALS
HEART RATE: 103 BPM | TEMPERATURE: 98.5 F | HEIGHT: 69 IN | OXYGEN SATURATION: 92 % | DIASTOLIC BLOOD PRESSURE: 72 MMHG | RESPIRATION RATE: 18 BRPM | BODY MASS INDEX: 31.58 KG/M2 | SYSTOLIC BLOOD PRESSURE: 118 MMHG | WEIGHT: 213.2 LBS

## 2025-09-04 LAB
ANION GAP SERPL CALCULATED.3IONS-SCNC: 12 MMOL/L (ref 7–15)
BUN SERPL-MCNC: 25.6 MG/DL (ref 8–23)
CALCIUM SERPL-MCNC: 9.2 MG/DL (ref 8.8–10.4)
CHLORIDE SERPL-SCNC: 104 MMOL/L (ref 98–107)
CREAT SERPL-MCNC: 1.53 MG/DL (ref 0.67–1.17)
EGFRCR SERPLBLD CKD-EPI 2021: 51 ML/MIN/1.73M2
GLUCOSE BLDC GLUCOMTR-MCNC: 124 MG/DL (ref 70–99)
GLUCOSE SERPL-MCNC: 123 MG/DL (ref 70–99)
HCO3 SERPL-SCNC: 22 MMOL/L (ref 22–29)
HOLD SPECIMEN: NORMAL
POTASSIUM SERPL-SCNC: 4.3 MMOL/L (ref 3.4–5.3)
SODIUM SERPL-SCNC: 138 MMOL/L (ref 135–145)

## 2025-09-04 PROCEDURE — 999N000157 HC STATISTIC RCP TIME EA 10 MIN

## 2025-09-04 PROCEDURE — 250N000013 HC RX MED GY IP 250 OP 250 PS 637: Performed by: INTERNAL MEDICINE

## 2025-09-04 PROCEDURE — 82962 GLUCOSE BLOOD TEST: CPT

## 2025-09-04 PROCEDURE — 250N000009 HC RX 250: Performed by: INTERNAL MEDICINE

## 2025-09-04 PROCEDURE — 94640 AIRWAY INHALATION TREATMENT: CPT

## 2025-09-04 PROCEDURE — 250N000012 HC RX MED GY IP 250 OP 636 PS 637: Performed by: INTERNAL MEDICINE

## 2025-09-04 PROCEDURE — G0378 HOSPITAL OBSERVATION PER HR: HCPCS

## 2025-09-04 PROCEDURE — 82435 ASSAY OF BLOOD CHLORIDE: CPT | Performed by: INTERNAL MEDICINE

## 2025-09-04 PROCEDURE — 36415 COLL VENOUS BLD VENIPUNCTURE: CPT | Performed by: INTERNAL MEDICINE

## 2025-09-04 RX ORDER — ALBUTEROL SULFATE 0.83 MG/ML
2.5 SOLUTION RESPIRATORY (INHALATION) EVERY 4 HOURS PRN
Status: SHIPPED
Start: 2025-09-04

## 2025-09-04 RX ORDER — ALBUTEROL SULFATE 90 UG/1
2 INHALANT RESPIRATORY (INHALATION) EVERY 4 HOURS PRN
Status: SHIPPED
Start: 2025-09-04

## 2025-09-04 RX ORDER — PREDNISONE 20 MG/1
40 TABLET ORAL 2 TIMES DAILY WITH MEALS
Status: DISPENSED | OUTPATIENT
Start: 2025-09-04

## 2025-09-04 RX ORDER — LORATADINE 10 MG/1
10 TABLET ORAL DAILY
Qty: 30 TABLET | Refills: 3 | Status: SHIPPED | OUTPATIENT
Start: 2025-09-04

## 2025-09-04 RX ORDER — LORATADINE 10 MG/1
10 TABLET ORAL DAILY
Status: DISPENSED | OUTPATIENT
Start: 2025-09-04

## 2025-09-04 RX ORDER — FLUTICASONE PROPIONATE 50 MCG
2 SPRAY, SUSPENSION (ML) NASAL 2 TIMES DAILY
Status: SHIPPED
Start: 2025-09-04

## 2025-09-04 RX ORDER — PREDNISONE 20 MG/1
40 TABLET ORAL 2 TIMES DAILY
Qty: 52 TABLET | Refills: 0 | Status: SHIPPED | OUTPATIENT
Start: 2025-09-04

## 2025-09-04 RX ORDER — MONTELUKAST SODIUM 10 MG/1
10 TABLET ORAL AT BEDTIME
Qty: 30 TABLET | Refills: 3 | Status: SHIPPED | OUTPATIENT
Start: 2025-09-04

## 2025-09-04 RX ADMIN — PREGABALIN 25 MG: 25 CAPSULE ORAL at 08:11

## 2025-09-04 RX ADMIN — PANTOPRAZOLE SODIUM 40 MG: 40 TABLET, DELAYED RELEASE ORAL at 18:05

## 2025-09-04 RX ADMIN — ASPIRIN 81 MG: 81 TABLET, COATED ORAL at 08:11

## 2025-09-04 RX ADMIN — LISINOPRIL 5 MG: 2.5 TABLET ORAL at 08:11

## 2025-09-04 RX ADMIN — LORATADINE 10 MG: 10 TABLET ORAL at 13:49

## 2025-09-04 RX ADMIN — TAMSULOSIN HYDROCHLORIDE 0.4 MG: 0.4 CAPSULE ORAL at 21:21

## 2025-09-04 RX ADMIN — ATORVASTATIN CALCIUM 40 MG: 40 TABLET, FILM COATED ORAL at 08:11

## 2025-09-04 RX ADMIN — IPRATROPIUM BROMIDE AND ALBUTEROL SULFATE 3 ML: .5; 3 SOLUTION RESPIRATORY (INHALATION) at 06:25

## 2025-09-04 RX ADMIN — MONTELUKAST 10 MG: 10 TABLET, FILM COATED ORAL at 21:21

## 2025-09-04 RX ADMIN — IPRATROPIUM BROMIDE AND ALBUTEROL SULFATE 3 ML: .5; 3 SOLUTION RESPIRATORY (INHALATION) at 16:13

## 2025-09-04 RX ADMIN — PREGABALIN 25 MG: 25 CAPSULE ORAL at 21:21

## 2025-09-04 RX ADMIN — Medication 1 LOZENGE: at 18:10

## 2025-09-04 RX ADMIN — FINASTERIDE 5 MG: 5 TABLET, FILM COATED ORAL at 08:11

## 2025-09-04 RX ADMIN — IPRATROPIUM BROMIDE AND ALBUTEROL SULFATE 3 ML: .5; 3 SOLUTION RESPIRATORY (INHALATION) at 20:18

## 2025-09-04 RX ADMIN — PREDNISONE 40 MG: 20 TABLET ORAL at 08:11

## 2025-09-04 RX ADMIN — ALUMINUM HYDROXIDE, MAGNESIUM HYDROXIDE, AND SIMETHICONE 30 ML: 200; 200; 20 SUSPENSION ORAL at 02:47

## 2025-09-04 RX ADMIN — IPRATROPIUM BROMIDE AND ALBUTEROL SULFATE 3 ML: .5; 3 SOLUTION RESPIRATORY (INHALATION) at 08:27

## 2025-09-04 RX ADMIN — MELATONIN 3 MG: 3 TAB ORAL at 21:21

## 2025-09-04 RX ADMIN — PREGABALIN 25 MG: 25 CAPSULE ORAL at 13:50

## 2025-09-04 RX ADMIN — IPRATROPIUM BROMIDE AND ALBUTEROL SULFATE 3 ML: .5; 3 SOLUTION RESPIRATORY (INHALATION) at 12:31

## 2025-09-04 RX ADMIN — PREDNISONE 40 MG: 20 TABLET ORAL at 18:05

## 2025-09-04 RX ADMIN — DOXYCYCLINE 100 MG: 100 CAPSULE ORAL at 08:11

## 2025-09-04 RX ADMIN — PANTOPRAZOLE SODIUM 40 MG: 40 TABLET, DELAYED RELEASE ORAL at 06:14

## 2025-09-04 RX ADMIN — DOXYCYCLINE 100 MG: 100 CAPSULE ORAL at 21:21

## 2025-09-04 ASSESSMENT — ACTIVITIES OF DAILY LIVING (ADL)
ADLS_ACUITY_SCORE: 26

## (undated) DEVICE — PACK GENERAL LAPAOSCOPY

## (undated) DEVICE — DEVICE SURGITIE LIGATING LOOP 2-0 21" ED-20-L

## (undated) DEVICE — ESU LIGASURE MARYLAND LAPAROSCOPIC SLR/DVDR 5MMX37CM LF1937

## (undated) DEVICE — GLOVE PROTEXIS W/NEU-THERA 8.0  2D73TE80

## (undated) DEVICE — ENDO TROCAR BLADELESS 05X100MM B5LT

## (undated) DEVICE — ENDO POUCH UNIVERSAL RETRIEVAL SYSTEM INZII 5MM CD003

## (undated) DEVICE — GLOVE PROTEXIS BLUE W/NEU-THERA 8.0  2D73EB80

## (undated) DEVICE — SU VICRYL 3-0 SH 27" UND J416H

## (undated) DEVICE — ESU GROUND PAD UNIVERSAL W/O CORD

## (undated) DEVICE — DRSG STERI STRIP 1/2X4" R1547

## (undated) DEVICE — SUCTION IRR STRYKERFLOW II W/TIP 250-070-520

## (undated) DEVICE — SOL NACL 0.9% INJ 1000ML BAG 07983-09

## (undated) DEVICE — STOCKING SLEEVE COMPRESSION CALF LG

## (undated) RX ORDER — ONDANSETRON 2 MG/ML
INJECTION INTRAMUSCULAR; INTRAVENOUS
Status: DISPENSED
Start: 2020-12-27

## (undated) RX ORDER — DEXAMETHASONE SODIUM PHOSPHATE 10 MG/ML
INJECTION, SOLUTION INTRAMUSCULAR; INTRAVENOUS
Status: DISPENSED
Start: 2020-12-27

## (undated) RX ORDER — LIDOCAINE HYDROCHLORIDE 20 MG/ML
INJECTION, SOLUTION EPIDURAL; INFILTRATION; INTRACAUDAL; PERINEURAL
Status: DISPENSED
Start: 2020-12-27

## (undated) RX ORDER — BUPIVACAINE HYDROCHLORIDE AND EPINEPHRINE 2.5; 5 MG/ML; UG/ML
INJECTION, SOLUTION EPIDURAL; INFILTRATION; INTRACAUDAL; PERINEURAL
Status: DISPENSED
Start: 2020-12-27

## (undated) RX ORDER — FENTANYL CITRATE 50 UG/ML
INJECTION, SOLUTION INTRAMUSCULAR; INTRAVENOUS
Status: DISPENSED
Start: 2017-06-22

## (undated) RX ORDER — PROPOFOL 10 MG/ML
INJECTION, EMULSION INTRAVENOUS
Status: DISPENSED
Start: 2020-12-27

## (undated) RX ORDER — FENTANYL CITRATE-0.9 % NACL/PF 10 MCG/ML
PLASTIC BAG, INJECTION (ML) INTRAVENOUS
Status: DISPENSED
Start: 2020-12-27

## (undated) RX ORDER — HYDROMORPHONE HYDROCHLORIDE 1 MG/ML
INJECTION, SOLUTION INTRAMUSCULAR; INTRAVENOUS; SUBCUTANEOUS
Status: DISPENSED
Start: 2020-12-27

## (undated) RX ORDER — FENTANYL CITRATE 50 UG/ML
INJECTION, SOLUTION INTRAMUSCULAR; INTRAVENOUS
Status: DISPENSED
Start: 2020-12-27

## (undated) RX ORDER — ESMOLOL HYDROCHLORIDE 10 MG/ML
INJECTION INTRAVENOUS
Status: DISPENSED
Start: 2020-12-27

## (undated) RX ORDER — HEPARIN SODIUM 1000 [USP'U]/ML
INJECTION, SOLUTION INTRAVENOUS; SUBCUTANEOUS
Status: DISPENSED
Start: 2017-06-22